# Patient Record
Sex: FEMALE | Race: WHITE | NOT HISPANIC OR LATINO | Employment: FULL TIME | URBAN - METROPOLITAN AREA
[De-identification: names, ages, dates, MRNs, and addresses within clinical notes are randomized per-mention and may not be internally consistent; named-entity substitution may affect disease eponyms.]

---

## 2019-05-28 ENCOUNTER — HOSPITAL ENCOUNTER (EMERGENCY)
Facility: OTHER | Age: 34
Discharge: HOME | End: 2019-05-28
Attending: EMERGENCY MEDICINE
Payer: COMMERCIAL

## 2019-05-28 VITALS
HEART RATE: 90 BPM | WEIGHT: 195 LBS | DIASTOLIC BLOOD PRESSURE: 84 MMHG | RESPIRATION RATE: 18 BRPM | HEIGHT: 64 IN | SYSTOLIC BLOOD PRESSURE: 119 MMHG | TEMPERATURE: 99 F | OXYGEN SATURATION: 96 % | BODY MASS INDEX: 33.29 KG/M2

## 2019-05-28 DIAGNOSIS — R42 LIGHT HEADEDNESS: ICD-10-CM

## 2019-05-28 DIAGNOSIS — R20.2 PARESTHESIA OF RIGHT ARM: Primary | ICD-10-CM

## 2019-05-28 LAB
ALBUMIN SERPL BCP-MCNC: 3.6 G/DL (ref 3.5–5.2)
ALP SERPL-CCNC: 83 U/L (ref 55–135)
ALT SERPL W/O P-5'-P-CCNC: 26 U/L (ref 10–44)
ANION GAP SERPL CALC-SCNC: 11 MMOL/L (ref 8–16)
AST SERPL-CCNC: 23 U/L (ref 10–40)
B-HCG UR QL: NEGATIVE
BASOPHILS # BLD AUTO: 0.02 K/UL (ref 0–0.2)
BASOPHILS NFR BLD: 0.2 % (ref 0–1.9)
BILIRUB SERPL-MCNC: 0.2 MG/DL (ref 0.1–1)
BUN SERPL-MCNC: 8 MG/DL (ref 6–20)
CALCIUM SERPL-MCNC: 9.4 MG/DL (ref 8.7–10.5)
CHLORIDE SERPL-SCNC: 108 MMOL/L (ref 95–110)
CO2 SERPL-SCNC: 22 MMOL/L (ref 23–29)
CREAT SERPL-MCNC: 0.7 MG/DL (ref 0.5–1.4)
CTP QC/QA: YES
D DIMER PPP IA.FEU-MCNC: 0.5 MG/L FEU
DIFFERENTIAL METHOD: ABNORMAL
EOSINOPHIL # BLD AUTO: 0.2 K/UL (ref 0–0.5)
EOSINOPHIL NFR BLD: 2.4 % (ref 0–8)
ERYTHROCYTE [DISTWIDTH] IN BLOOD BY AUTOMATED COUNT: 12.9 % (ref 11.5–14.5)
EST. GFR  (AFRICAN AMERICAN): >60 ML/MIN/1.73 M^2
EST. GFR  (NON AFRICAN AMERICAN): >60 ML/MIN/1.73 M^2
GLUCOSE SERPL-MCNC: 97 MG/DL (ref 70–110)
HCT VFR BLD AUTO: 39.7 % (ref 37–48.5)
HGB BLD-MCNC: 13.9 G/DL (ref 12–16)
LYMPHOCYTES # BLD AUTO: 2.4 K/UL (ref 1–4.8)
LYMPHOCYTES NFR BLD: 29.2 % (ref 18–48)
MCH RBC QN AUTO: 31.2 PG (ref 27–31)
MCHC RBC AUTO-ENTMCNC: 35 G/DL (ref 32–36)
MCV RBC AUTO: 89 FL (ref 82–98)
MONOCYTES # BLD AUTO: 0.5 K/UL (ref 0.3–1)
MONOCYTES NFR BLD: 5.9 % (ref 4–15)
NEUTROPHILS # BLD AUTO: 5.2 K/UL (ref 1.8–7.7)
NEUTROPHILS NFR BLD: 61.9 % (ref 38–73)
PLATELET # BLD AUTO: 278 K/UL (ref 150–350)
PMV BLD AUTO: 10.6 FL (ref 9.2–12.9)
POTASSIUM SERPL-SCNC: 3.8 MMOL/L (ref 3.5–5.1)
PROT SERPL-MCNC: 7.2 G/DL (ref 6–8.4)
RBC # BLD AUTO: 4.45 M/UL (ref 4–5.4)
SODIUM SERPL-SCNC: 141 MMOL/L (ref 136–145)
WBC # BLD AUTO: 8.37 K/UL (ref 3.9–12.7)

## 2019-05-28 PROCEDURE — 99284 EMERGENCY DEPT VISIT MOD MDM: CPT

## 2019-05-28 PROCEDURE — 85025 COMPLETE CBC W/AUTO DIFF WBC: CPT

## 2019-05-28 PROCEDURE — 93005 ELECTROCARDIOGRAM TRACING: CPT

## 2019-05-28 PROCEDURE — 81025 URINE PREGNANCY TEST: CPT | Performed by: EMERGENCY MEDICINE

## 2019-05-28 PROCEDURE — 93010 EKG 12-LEAD: ICD-10-PCS | Mod: ,,, | Performed by: INTERNAL MEDICINE

## 2019-05-28 PROCEDURE — 93010 ELECTROCARDIOGRAM REPORT: CPT | Mod: ,,, | Performed by: INTERNAL MEDICINE

## 2019-05-28 PROCEDURE — 80053 COMPREHEN METABOLIC PANEL: CPT

## 2019-05-28 PROCEDURE — 85379 FIBRIN DEGRADATION QUANT: CPT

## 2019-05-29 NOTE — ED TRIAGE NOTES
Pt arrives to ED with c/o right arm pain, numbness and tingling. Pt reports blood vessels hyperpigmented on Thursday. Pt reports soreness x 1 month. Pt denies SOB, chest pain, N/V/D. PT +2 radial pulse equally bilaterally. Pt denies numbness and tingling at this time. Pt sitting in exam bed, respirations even, unlabored, eyes open spontaneously, NAD noted, AAOx4, answering questions appropriately.

## 2019-05-29 NOTE — ED PROVIDER NOTES
"Encounter Date: 5/28/2019    SCRIBE #1 NOTE: I, Thaddeus Wallace, am scribing for, and in the presence of, Dr. Kruse .       History     Chief Complaint   Patient presents with    Arm Pain     Pt reports pain to right arm x 2 months, reports lightheadedness and "headache" x 3 days     Time seen by provider: 8:07 PM    This is a 33 y.o. female who presents with complaint of constant, right, arm pain described as soreness for two months. Patient states the soreness extends from the fingertips to her right anterior chest wall. She is right hand dominant and thought she was sleeping on the arm initially. She denies recent falls or trauma to the arm. She reports intermittent tingling in the right thumb and index finger. Patient states the veins in the arm was very prominent five nights ago while at a bar. She does admit to consuming a few alcoholic drinks that night. She denies neck pain or back pain. Patient states she presented to Urgent Care on 5/24, was prescribed a muscle relaxer, and was "referred to a vascular physician". She reports the next available appointment is on 6/12, so she decided to present to the ED.      She has been feeling light-headed for three days, but denies LOC. She reports waking up the last three mornings sweating. She reports rare mild chest pain (deascribed as "feeling the soreness from my arm") with deep inspirations and clear rhinorrhea. She denies fevers, chills, headaches, congestion, sore throat, cough, SOB, abdominal pain, nausea, vomiting, diarrhea, dysuria, urinary frequency, or urinary urgency.     She denies significant past medical history or past surgical history. She admits to daily use of citalopram. She admits to daily use of tobacco and intermittent use of alcohol (3-5 drinks each time). She denies use of illicit drugs. Per friend, patient's cheeks are more "flush" than usual. She denies being out in the sun recently.       The history is provided by the patient (friend at " bedside).     Review of patient's allergies indicates:  No Known Allergies  Past Medical History:   Diagnosis Date    H/O LEEP      Past Surgical History:   Procedure Laterality Date    TONSILLECTOMY       History reviewed. No pertinent family history.  Social History     Tobacco Use    Smoking status: Never Smoker   Substance Use Topics    Alcohol use: Never     Frequency: Never    Drug use: Never     Review of Systems   Constitutional: Positive for diaphoresis. Negative for chills and fever.   HENT: Positive for rhinorrhea. Negative for congestion and sore throat.    Respiratory: Negative for cough and shortness of breath.    Cardiovascular: Positive for chest pain (with deep inspirations).   Gastrointestinal: Negative for abdominal pain, diarrhea, nausea and vomiting.   Genitourinary: Negative for dysuria, frequency and urgency.   Musculoskeletal: Negative for back pain and neck pain.        Positive for right arm soreness.    Skin: Negative for rash.   Neurological: Positive for light-headedness. Negative for syncope and headaches.        Positive for tingling in right fingers.    Psychiatric/Behavioral: Negative for confusion.       Physical Exam     Initial Vitals [05/28/19 1956]   BP Pulse Resp Temp SpO2   (!) 135/90 100 18 99.1 °F (37.3 °C) 98 %      MAP       --         Physical Exam    Nursing note and vitals reviewed.  Constitutional: She appears well-developed and well-nourished. No distress.   HENT:   Head: Normocephalic and atraumatic.   Nose: Nose normal.   Mouth/Throat: Oropharynx is clear and moist.   Eyes: Conjunctivae and EOM are normal. Pupils are equal, round, and reactive to light.   Neck: Normal range of motion. Neck supple.   Cardiovascular: Normal rate, regular rhythm, normal heart sounds and intact distal pulses.   No murmur heard.  Pulses:       Radial pulses are 2+ on the right side, and 2+ on the left side.   Pulmonary/Chest: Breath sounds normal. No respiratory distress. She has no  wheezes. She has no rhonchi. She has no rales.   Abdominal: Soft. There is no tenderness. There is no rebound.   Musculoskeletal: Normal range of motion. She exhibits no tenderness.   RUE: No pain with ROM of all joints of upper extremities.    Neurological: She is alert and oriented to person, place, and time. She has normal strength. No cranial nerve deficit or sensory deficit. GCS score is 15. GCS eye subscore is 4. GCS verbal subscore is 5. GCS motor subscore is 6.   AI/PI/R/U/M intact to bilateral upper extremities for strength and sensation, equal.   Skin: Skin is warm and dry.   Psychiatric: She has a normal mood and affect. Her behavior is normal. Judgment and thought content normal.         ED Course   Procedures  Labs Reviewed   CBC W/ AUTO DIFFERENTIAL - Abnormal; Notable for the following components:       Result Value    Mean Corpuscular Hemoglobin 31.2 (*)     All other components within normal limits   COMPREHENSIVE METABOLIC PANEL   D DIMER, QUANTITATIVE   POCT URINE PREGNANCY     EKG Readings: (Independently Interpreted)   Initial Reading: No STEMI. Rhythm: Normal Sinus Rhythm. Heart Rate: 86. Ectopy: No Ectopy.       Imaging Results          US Upper Extremity Veins Right (In process)                  Medical Decision Making:   Independently Interpreted Test(s):   I have ordered and independently interpreted EKG Reading(s) - see prior notes  Clinical Tests:   Lab Tests: Ordered and Reviewed  Radiological Study: Ordered and Reviewed  Medical Tests: Ordered and Reviewed  ED Management:  Emergent evaluation of a 33-year-old female who presents with complaint of right arm pain for months, lightheadedness for days.  She was not orthostatic and has normal vital signs.  Pregnancy test is negative. EKG shows no acute ischemic change or dysrhythmia.  Labs are benign with no leukocytosis, no anemia, no major electrolyte disturbance.  There was slight elevation in D-dimer and ultrasound of the right upper  extremity was performed and negative. I do not suspect a pulmonary embolism, no tachycardia or shortness of breath or other risk factor.  Ultimately, she is discharged home in good condition.  She is encouraged close follow-up with her PCP or to return for new or worsening.  At time of discharge patient became hostile and angry, upset that there was no clear diagnosis to explain her symptoms. I encouraged her to follow up for further testing and to return for worsening.            Scribe Attestation:   Scribe #1: I performed the above scribed service and the documentation accurately describes the services I performed. I attest to the accuracy of the note.    Attending Attestation:           Physician Attestation for Scribe:  Physician Attestation Statement for Scribe #1: I, Dr. Kruse, reviewed documentation, as scribed by Thaddeus Wallace  in my presence, and it is both accurate and complete.                    Clinical Impression:     1. Light headedness                                 Kayce Kruse MD  05/29/19 9131

## 2020-06-10 ENCOUNTER — HOSPITAL ENCOUNTER (INPATIENT)
Facility: OTHER | Age: 35
LOS: 3 days | Discharge: HOME | DRG: 060 | End: 2020-06-14
Attending: EMERGENCY MEDICINE | Admitting: INTERNAL MEDICINE
Payer: COMMERCIAL

## 2020-06-10 DIAGNOSIS — G83.14 PARALYSIS OF LEFT LOWER EXTREMITY: ICD-10-CM

## 2020-06-10 DIAGNOSIS — G93.9 BRAIN LESION: ICD-10-CM

## 2020-06-10 DIAGNOSIS — R20.2 PARESTHESIA OF LEFT UPPER AND LOWER EXTREMITY: ICD-10-CM

## 2020-06-10 DIAGNOSIS — R20.2 PARESTHESIA OF LEFT UPPER EXTREMITY: ICD-10-CM

## 2020-06-10 DIAGNOSIS — G35 MS (MULTIPLE SCLEROSIS): Primary | ICD-10-CM

## 2020-06-10 PROBLEM — R73.9 HYPERGLYCEMIA: Status: ACTIVE | Noted: 2020-06-10

## 2020-06-10 PROBLEM — E86.0 DEHYDRATION: Status: ACTIVE | Noted: 2020-06-10

## 2020-06-10 LAB
ALBUMIN SERPL BCP-MCNC: 3.8 G/DL (ref 3.5–5.2)
ALP SERPL-CCNC: 95 U/L (ref 55–135)
ALT SERPL W/O P-5'-P-CCNC: 30 U/L (ref 10–44)
ANION GAP SERPL CALC-SCNC: 13 MMOL/L (ref 8–16)
AST SERPL-CCNC: 23 U/L (ref 10–40)
B-HCG UR QL: NEGATIVE
BASOPHILS # BLD AUTO: 0.03 K/UL (ref 0–0.2)
BASOPHILS NFR BLD: 0.2 % (ref 0–1.9)
BILIRUB SERPL-MCNC: 0.4 MG/DL (ref 0.1–1)
BILIRUB UR QL STRIP: ABNORMAL
BUN SERPL-MCNC: 8 MG/DL (ref 6–20)
CALCIUM SERPL-MCNC: 9.7 MG/DL (ref 8.7–10.5)
CHLORIDE SERPL-SCNC: 105 MMOL/L (ref 95–110)
CLARITY UR: CLEAR
CO2 SERPL-SCNC: 19 MMOL/L (ref 23–29)
COLOR UR: YELLOW
CREAT SERPL-MCNC: 0.8 MG/DL (ref 0.5–1.4)
CTP QC/QA: YES
DIFFERENTIAL METHOD: ABNORMAL
EOSINOPHIL # BLD AUTO: 0.1 K/UL (ref 0–0.5)
EOSINOPHIL NFR BLD: 1 % (ref 0–8)
ERYTHROCYTE [DISTWIDTH] IN BLOOD BY AUTOMATED COUNT: 13 % (ref 11.5–14.5)
EST. GFR  (AFRICAN AMERICAN): >60 ML/MIN/1.73 M^2
EST. GFR  (NON AFRICAN AMERICAN): >60 ML/MIN/1.73 M^2
GLUCOSE SERPL-MCNC: 210 MG/DL (ref 70–110)
GLUCOSE SERPL-MCNC: 215 MG/DL (ref 70–110)
GLUCOSE UR QL STRIP: NEGATIVE
HCT VFR BLD AUTO: 45.6 % (ref 37–48.5)
HGB BLD-MCNC: 15 G/DL (ref 12–16)
HGB UR QL STRIP: NEGATIVE
IMM GRANULOCYTES # BLD AUTO: 0.04 K/UL (ref 0–0.04)
IMM GRANULOCYTES NFR BLD AUTO: 0.3 % (ref 0–0.5)
INR PPP: 0.9 (ref 0.8–1.2)
KETONES UR QL STRIP: ABNORMAL
LEUKOCYTE ESTERASE UR QL STRIP: NEGATIVE
LYMPHOCYTES # BLD AUTO: 3.5 K/UL (ref 1–4.8)
LYMPHOCYTES NFR BLD: 26.3 % (ref 18–48)
MCH RBC QN AUTO: 30.1 PG (ref 27–31)
MCHC RBC AUTO-ENTMCNC: 32.9 G/DL (ref 32–36)
MCV RBC AUTO: 91 FL (ref 82–98)
MONOCYTES # BLD AUTO: 0.3 K/UL (ref 0.3–1)
MONOCYTES NFR BLD: 2.1 % (ref 4–15)
NEUTROPHILS # BLD AUTO: 9.4 K/UL (ref 1.8–7.7)
NEUTROPHILS NFR BLD: 70.1 % (ref 38–73)
NITRITE UR QL STRIP: NEGATIVE
NRBC BLD-RTO: 0 /100 WBC
PH UR STRIP: 6 [PH] (ref 5–8)
PLATELET # BLD AUTO: 325 K/UL (ref 150–350)
PMV BLD AUTO: 11.2 FL (ref 9.2–12.9)
POCT GLUCOSE: 215 MG/DL (ref 70–110)
POTASSIUM SERPL-SCNC: 4 MMOL/L (ref 3.5–5.1)
PROT SERPL-MCNC: 7.4 G/DL (ref 6–8.4)
PROT UR QL STRIP: NEGATIVE
PROTHROMBIN TIME: 9.9 SEC (ref 9–12.5)
RBC # BLD AUTO: 4.99 M/UL (ref 4–5.4)
SARS-COV-2 RDRP RESP QL NAA+PROBE: NEGATIVE
SODIUM SERPL-SCNC: 137 MMOL/L (ref 136–145)
SP GR UR STRIP: >=1.03 (ref 1–1.03)
TSH SERPL DL<=0.005 MIU/L-ACNC: 0.56 UIU/ML (ref 0.4–4)
URN SPEC COLLECT METH UR: ABNORMAL
UROBILINOGEN UR STRIP-ACNC: NEGATIVE EU/DL
WBC # BLD AUTO: 13.37 K/UL (ref 3.9–12.7)

## 2020-06-10 PROCEDURE — 85610 PROTHROMBIN TIME: CPT

## 2020-06-10 PROCEDURE — 81003 URINALYSIS AUTO W/O SCOPE: CPT

## 2020-06-10 PROCEDURE — 99220 PR INITIAL OBSERVATION CARE,LEVL III: CPT | Mod: ,,, | Performed by: PHYSICIAN ASSISTANT

## 2020-06-10 PROCEDURE — 99285 EMERGENCY DEPT VISIT HI MDM: CPT | Mod: 25

## 2020-06-10 PROCEDURE — G0378 HOSPITAL OBSERVATION PER HR: HCPCS

## 2020-06-10 PROCEDURE — 99220 PR INITIAL OBSERVATION CARE,LEVL III: ICD-10-PCS | Mod: ,,, | Performed by: PHYSICIAN ASSISTANT

## 2020-06-10 PROCEDURE — 80053 COMPREHEN METABOLIC PANEL: CPT

## 2020-06-10 PROCEDURE — 85025 COMPLETE CBC W/AUTO DIFF WBC: CPT

## 2020-06-10 PROCEDURE — 84443 ASSAY THYROID STIM HORMONE: CPT

## 2020-06-10 PROCEDURE — 80061 LIPID PANEL: CPT

## 2020-06-10 PROCEDURE — 81025 URINE PREGNANCY TEST: CPT | Performed by: PHYSICIAN ASSISTANT

## 2020-06-10 PROCEDURE — 94761 N-INVAS EAR/PLS OXIMETRY MLT: CPT

## 2020-06-10 PROCEDURE — U0002 COVID-19 LAB TEST NON-CDC: HCPCS

## 2020-06-10 PROCEDURE — 82962 GLUCOSE BLOOD TEST: CPT

## 2020-06-10 RX ORDER — SODIUM CHLORIDE 9 MG/ML
INJECTION, SOLUTION INTRAVENOUS CONTINUOUS
Status: DISCONTINUED | OUTPATIENT
Start: 2020-06-10 | End: 2020-06-11

## 2020-06-10 RX ORDER — LABETALOL HYDROCHLORIDE 5 MG/ML
10 INJECTION, SOLUTION INTRAVENOUS
Status: DISCONTINUED | OUTPATIENT
Start: 2020-06-10 | End: 2020-06-11

## 2020-06-10 RX ORDER — ENOXAPARIN SODIUM 100 MG/ML
40 INJECTION SUBCUTANEOUS EVERY 24 HOURS
Status: DISCONTINUED | OUTPATIENT
Start: 2020-06-10 | End: 2020-06-11

## 2020-06-10 RX ORDER — SODIUM CHLORIDE 0.9 % (FLUSH) 0.9 %
10 SYRINGE (ML) INJECTION
Status: DISCONTINUED | OUTPATIENT
Start: 2020-06-10 | End: 2020-06-14 | Stop reason: HOSPADM

## 2020-06-10 RX ORDER — ONDANSETRON 8 MG/1
8 TABLET, ORALLY DISINTEGRATING ORAL EVERY 8 HOURS PRN
Status: DISCONTINUED | OUTPATIENT
Start: 2020-06-10 | End: 2020-06-14 | Stop reason: HOSPADM

## 2020-06-10 RX ORDER — CITALOPRAM 10 MG/1
10 TABLET ORAL DAILY
Status: DISCONTINUED | OUTPATIENT
Start: 2020-06-11 | End: 2020-06-14 | Stop reason: HOSPADM

## 2020-06-10 NOTE — ED TRIAGE NOTES
Pt presents to ed c/o L sided knumbness with tingling that started Monday. She denies taking anything for the s/s. Pt admits to speaking to a tele doctor about the situation and she states he told her it might be neurological. Pt denies having an recent falls, denies any diplopia, or photophobia. Pt has equal hand . Pt has symmetric bilateral sensation to both sides of her face, bilateral Upper and Lower extremities. She denies any current weakness, with noted symmetric smile with no significant facial dropping. Pt has equal pupils, about 4  mm, brisk. She denies any n/v/d, fever, cough, or SOB. Pt AAOx4, resp pattern even and non labored.

## 2020-06-10 NOTE — ED PROVIDER NOTES
Encounter Date: 6/10/2020       History     Chief Complaint   Patient presents with    Numbness     began in the L foot yesterday that has now travelled up the entire L side of the body which she noticed at 0645 today. Pins and needles feeling     Afebrile 34-year-old female with PMH of anxiety presents the ED for evaluation of left-sided paresthesias.  Patient states that she awoke on Monday and noted that her left foot had a pins and needle sensation.  She states that extends to just distal of the left knee.  She denies any recent falls or trauma.  She denies any ill positioning during sleep.  She states this has been constant since onset.  She states that she awoke today and that she had a similar pins and needle sensation to the left upper extremity.  She states that is located to the generalized hand and extends to just proximal to the left elbow.  Once again she denies any recent falls or trauma.  She states this sensation has been constant.  She describes a sensation of heaviness to the left extremities however denies any overt weakness.  She denies any vision changes, headache, fever, chills, neck pain, back pain, chest pain, shortness of breath, dropping of objects, change in gait, inability to bear weight or other complaints at this time.  She has not tried any medications for symptoms.  She did complete a telemedicine visit through her insurance and the provider encouraged her to seek further evaluation as there was a concern of neurological involvement.  She denies any prior CVA, TIA or family history of strokes.    The history is provided by the patient.     Review of patient's allergies indicates:   Allergen Reactions    Gluten protein     Soy      Past Medical History:   Diagnosis Date    Anxiety     H/O LEEP      Past Surgical History:   Procedure Laterality Date    TONSILLECTOMY       History reviewed. No pertinent family history.  Social History     Tobacco Use    Smoking status: Current  Every Day Smoker     Packs/day: 0.50   Substance Use Topics    Alcohol use: Yes     Frequency: Never     Comment: social    Drug use: Never     Review of Systems   Constitutional: Negative for chills and fever.   HENT: Negative for sore throat.    Eyes: Negative for visual disturbance.   Respiratory: Negative for shortness of breath.    Cardiovascular: Negative for chest pain.   Gastrointestinal: Negative for nausea and vomiting.   Genitourinary: Negative for flank pain.   Musculoskeletal: Negative for arthralgias, back pain and myalgias.   Skin: Negative for rash.   Allergic/Immunologic: Negative for immunocompromised state.   Neurological: Positive for numbness. Negative for dizziness, syncope, speech difficulty, weakness, light-headedness and headaches.        Paresthesias to the left upper and lower extremity   Hematological: Does not bruise/bleed easily.   Psychiatric/Behavioral: Negative for confusion. The patient is nervous/anxious.        Physical Exam     Initial Vitals [06/10/20 1701]   BP Pulse Resp Temp SpO2   130/80 (!) 120 20 98.2 °F (36.8 °C) 96 %      MAP       --         Vitals:    06/10/20 1758   BP:    Pulse: 93   Resp:    Temp:        Physical Exam    Nursing note and vitals reviewed.  Constitutional: Vital signs are normal. She appears well-developed and well-nourished. She is cooperative.  Non-toxic appearance. She does not appear ill. No distress.   HENT:   Head: Normocephalic and atraumatic.   Eyes: Conjunctivae, EOM and lids are normal. Pupils are equal, round, and reactive to light.   Neck: Neck supple. No neck rigidity.   Cardiovascular: Normal rate and regular rhythm.   Pulmonary/Chest: Breath sounds normal. No respiratory distress. She has no wheezes. She has no rhonchi.   Abdominal: Soft. Normal appearance. There is no tenderness. There is no rigidity and no guarding.   Musculoskeletal: Normal range of motion.   Neurological: She is alert and oriented to person, place, and time. She  has normal strength. No cranial nerve deficit or sensory deficit. Coordination and gait normal. GCS score is 15. GCS eye subscore is 4. GCS verbal subscore is 5. GCS motor subscore is 6.   Reflex Scores:       Tricep reflexes are 1+ on the right side and 1+ on the left side.       Patellar reflexes are 2+ on the right side and 1+ on the left side.  Normal finger-nose, patient was able to ambulate smooth steady gait and turn completely with no difficulty or ataxia.   Skin: Skin is warm, dry and intact. No rash noted.   Psychiatric: Her speech is normal and behavior is normal. Thought content normal. Her mood appears anxious.         ED Course   Procedures  Labs Reviewed   CBC W/ AUTO DIFFERENTIAL - Abnormal; Notable for the following components:       Result Value    WBC 13.37 (*)     Gran # (ANC) 9.4 (*)     Mono% 2.1 (*)     All other components within normal limits   URINALYSIS, REFLEX TO URINE CULTURE - Abnormal; Notable for the following components:    Specific Gravity, UA >=1.030 (*)     Ketones, UA Trace (*)     Bilirubin (UA) 1+ (*)     All other components within normal limits    Narrative:     Preferred Collection Type->Urine, Clean Catch   COMPREHENSIVE METABOLIC PANEL - Abnormal; Notable for the following components:    CO2 19 (*)     Glucose 210 (*)     All other components within normal limits   POCT GLUCOSE, HAND-HELD DEVICE - Abnormal; Notable for the following components:    POC Glucose 215 (*)     All other components within normal limits   POCT GLUCOSE - Abnormal; Notable for the following components:    POCT Glucose 215 (*)     All other components within normal limits   TSH   PROTIME-INR   COMPREHENSIVE METABOLIC PANEL   SARS-COV-2 RNA AMPLIFICATION, QUAL   LIPID PANEL   POCT URINE PREGNANCY          Imaging Results          CT Head Without Contrast (Final result)  Result time 06/10/20 18:56:00    Final result by Lianet Avila MD (06/10/20 18:56:00)                 Impression:      No acute  intracranial abnormality detected. If persistent neurological symptoms, recommend MRI of the brain with diffusion-weighted sequencing.      Electronically signed by: Lianet Avila  Date:    06/10/2020  Time:    18:56             Narrative:    EXAMINATION:  CT OF THE HEAD WITHOUT    CLINICAL HISTORY:  Focal neuro deficit, new, fixed or worsening, >6 hours;    TECHNIQUE:  5 mm unenhanced axial images were obtained from the skull base to the vertex.    COMPARISON:  None.    FINDINGS:  The ventricles, basal cisterns, and cortical sulci are within normal limits for patient's stated age. There is no acute intracranial hemorrhage, territorial infarct or mass effect, or midline shift. The visualized paranasal sinuses and mastoid air cells are clear.                                 Medical Decision Making:   Initial Assessment:   34-year-old female presents to the ED for evaluation of left upper and lower extremity paresthesias.  She states symptoms began Monday upon waking to the left lower extremity.  She states that she then began with left upper extremity paresthesias today.  She describes it as a pins and needle sensation with associated heaviness.  She denies any definite numbness.  She appears well and ambulates with smooth steady gait and no difficulty.  No focal neuro deficit on exam.  Sensation grossly intact bilaterally.  Skin free of rash, pallor, diaphoresis.  Distal pulses intact.  Capillary refill intact  Differential Diagnosis:   Differential Diagnosis includes, but is not limited to:  Fracture, dislocation, compartment syndrome, nerve injury/palsy, vascular injury, rhabdomyolysis, hemarthrosis, septic joint, bursitis, muscle strain, ligament tear/sprain, laceration with foreign body, abrasion, soft tissue contusion, osteoarthritis.    Clinical Tests:   Lab Tests: Ordered and Reviewed  Radiological Study: Ordered and Reviewed  ED Management:  Given patient's progressing and continued paresthesias of the  left upper and lower extremity labs and CT obtained although discussed no focal neuro deficit on exam.  Labs notable for trace ketones in urine.  Mild leukocytosis at 13.  Blood glucose is elevated however no elevated anion gap.  Remaining labs grossly unremarkable.  CT with no acute findings at this time.  Did discuss patient's presentation for possible concern with MS and will consult Neurology.  Neurology ( Dr. Mccartney) was consulted and recommends patient to be placed in observation with MRI of brain and spine with and without contrast for further evaluation.  Patient will be admitted to hospitalist for further evaluation.  Discussed all results with patient and plan for MRI in the morning for further evaluation with this imaging modality by neurology.                   ED Course as of Scott 10 2300   Wed Scott 10, 2020   1920 Pt is a 34 y.o. F history of R thoracic outlet syndrome who presents with paresthesias at left leg 2 days ago, and now also at her left arm. She denies any other focal deficits. No trauma. No history consistent with optic neuritis, or urinary retention.   Initial Ddx included stroke, TIA, MS, electrolyte abnormality.   Physician Attestation Statement: I have reviewed this case with my non-physician provider.  Physician Attestation Statement: I have provided a face to face evaluation of this patient at the request of my  non-physician provider.  Physician Attestation Statement: The patient's condition warranted physician involvement. The treatment regimen was reviewed by me.   Management decisions for this encounter made amidst early acute phase of COVID19 public health emergency; emergency medicine workup standards and admission vs. discharge standards have necessarily shifted.     [RC]      ED Course User Index  [RC] Marty Cabral MD                Clinical Impression:       ICD-10-CM ICD-9-CM   1. Paresthesia of left upper extremity R20.2 782.0   2. Paralysis of left lower extremity  G83.14 344.32                                ANNAMARIE Kerns  06/10/20 6652

## 2020-06-11 PROBLEM — G93.9 BRAIN LESION: Status: ACTIVE | Noted: 2020-06-10

## 2020-06-11 PROBLEM — R20.2 PARESTHESIA OF LEFT UPPER EXTREMITY: Status: ACTIVE | Noted: 2020-06-11

## 2020-06-11 PROBLEM — F10.90 CHRONIC ALCOHOL USE: Status: ACTIVE | Noted: 2020-06-11

## 2020-06-11 LAB
25(OH)D3+25(OH)D2 SERPL-MCNC: 18 NG/ML (ref 30–96)
ALBUMIN SERPL BCP-MCNC: 3.3 G/DL (ref 3.5–5.2)
ALP SERPL-CCNC: 79 U/L (ref 55–135)
ALT SERPL W/O P-5'-P-CCNC: 25 U/L (ref 10–44)
ANION GAP SERPL CALC-SCNC: 10 MMOL/L (ref 8–16)
APTT BLDCRRT: 32.1 SEC (ref 21–32)
AST SERPL-CCNC: 18 U/L (ref 10–40)
BACTERIA #/AREA URNS HPF: NORMAL /HPF
BASOPHILS # BLD AUTO: 0.03 K/UL (ref 0–0.2)
BASOPHILS NFR BLD: 0.2 % (ref 0–1.9)
BILIRUB SERPL-MCNC: 0.4 MG/DL (ref 0.1–1)
BILIRUB UR QL STRIP: NEGATIVE
BLOOD COLLECTION FOR MS PROFILE: NORMAL
BUN SERPL-MCNC: 10 MG/DL (ref 6–20)
CALCIUM SERPL-MCNC: 8.7 MG/DL (ref 8.7–10.5)
CHLORIDE SERPL-SCNC: 108 MMOL/L (ref 95–110)
CHOLEST SERPL-MCNC: 232 MG/DL (ref 120–199)
CHOLEST/HDLC SERPL: 4.6 {RATIO} (ref 2–5)
CK MB SERPL-MCNC: 0.2 NG/ML (ref 0.1–6.5)
CK MB SERPL-RTO: 1.5 % (ref 0–5)
CK SERPL-CCNC: 13 U/L (ref 20–180)
CLARITY UR: ABNORMAL
CO2 SERPL-SCNC: 20 MMOL/L (ref 23–29)
COLOR UR: YELLOW
CREAT SERPL-MCNC: 0.7 MG/DL (ref 0.5–1.4)
DIFFERENTIAL METHOD: ABNORMAL
EOSINOPHIL # BLD AUTO: 0.2 K/UL (ref 0–0.5)
EOSINOPHIL NFR BLD: 1.3 % (ref 0–8)
ERYTHROCYTE [DISTWIDTH] IN BLOOD BY AUTOMATED COUNT: 12.9 % (ref 11.5–14.5)
EST. GFR  (AFRICAN AMERICAN): >60 ML/MIN/1.73 M^2
EST. GFR  (NON AFRICAN AMERICAN): >60 ML/MIN/1.73 M^2
ESTIMATED AVG GLUCOSE: 100 MG/DL (ref 68–131)
ESTIMATED AVG GLUCOSE: 97 MG/DL (ref 68–131)
FOLATE SERPL-MCNC: 5.8 NG/ML (ref 4–24)
GLUCOSE SERPL-MCNC: 84 MG/DL (ref 70–110)
GLUCOSE UR QL STRIP: NEGATIVE
HBA1C MFR BLD HPLC: 5 % (ref 4–5.6)
HBA1C MFR BLD HPLC: 5.1 % (ref 4–5.6)
HCT VFR BLD AUTO: 41 % (ref 37–48.5)
HDLC SERPL-MCNC: 50 MG/DL (ref 40–75)
HDLC SERPL: 21.6 % (ref 20–50)
HGB BLD-MCNC: 13.3 G/DL (ref 12–16)
HGB UR QL STRIP: NEGATIVE
IMM GRANULOCYTES # BLD AUTO: 0.07 K/UL (ref 0–0.04)
IMM GRANULOCYTES NFR BLD AUTO: 0.5 % (ref 0–0.5)
INR PPP: 0.9 (ref 0.8–1.2)
KETONES UR QL STRIP: NEGATIVE
LDLC SERPL CALC-MCNC: 149.6 MG/DL (ref 63–159)
LEUKOCYTE ESTERASE UR QL STRIP: ABNORMAL
LYMPHOCYTES # BLD AUTO: 4.8 K/UL (ref 1–4.8)
LYMPHOCYTES NFR BLD: 36.7 % (ref 18–48)
MAGNESIUM SERPL-MCNC: 1.9 MG/DL (ref 1.6–2.6)
MCH RBC QN AUTO: 30.2 PG (ref 27–31)
MCHC RBC AUTO-ENTMCNC: 32.4 G/DL (ref 32–36)
MCV RBC AUTO: 93 FL (ref 82–98)
MICROSCOPIC COMMENT: NORMAL
MONOCYTES # BLD AUTO: 0.5 K/UL (ref 0.3–1)
MONOCYTES NFR BLD: 4.1 % (ref 4–15)
NEUTROPHILS # BLD AUTO: 7.5 K/UL (ref 1.8–7.7)
NEUTROPHILS NFR BLD: 57.2 % (ref 38–73)
NITRITE UR QL STRIP: NEGATIVE
NONHDLC SERPL-MCNC: 182 MG/DL
NRBC BLD-RTO: 0 /100 WBC
PH UR STRIP: 6 [PH] (ref 5–8)
PHOSPHATE SERPL-MCNC: 3.1 MG/DL (ref 2.7–4.5)
PLATELET # BLD AUTO: 276 K/UL (ref 150–350)
PMV BLD AUTO: 11.5 FL (ref 9.2–12.9)
POTASSIUM SERPL-SCNC: 3.9 MMOL/L (ref 3.5–5.1)
PROT SERPL-MCNC: 6.3 G/DL (ref 6–8.4)
PROT UR QL STRIP: NEGATIVE
PROTHROMBIN TIME: 9.8 SEC (ref 9–12.5)
RBC # BLD AUTO: 4.4 M/UL (ref 4–5.4)
SODIUM SERPL-SCNC: 138 MMOL/L (ref 136–145)
SP GR UR STRIP: 1.02 (ref 1–1.03)
SQUAMOUS #/AREA URNS HPF: 3 /HPF
TRIGL SERPL-MCNC: 162 MG/DL (ref 30–150)
TROPONIN I SERPL DL<=0.01 NG/ML-MCNC: 0.01 NG/ML (ref 0–0.03)
URN SPEC COLLECT METH UR: ABNORMAL
UROBILINOGEN UR STRIP-ACNC: NEGATIVE EU/DL
VIT B12 SERPL-MCNC: 379 PG/ML (ref 210–950)
WBC # BLD AUTO: 13.11 K/UL (ref 3.9–12.7)
WBC #/AREA URNS HPF: 2 /HPF (ref 0–5)

## 2020-06-11 PROCEDURE — 83036 HEMOGLOBIN GLYCOSYLATED A1C: CPT | Mod: 91

## 2020-06-11 PROCEDURE — 83036 HEMOGLOBIN GLYCOSYLATED A1C: CPT

## 2020-06-11 PROCEDURE — 84207 ASSAY OF VITAMIN B-6: CPT

## 2020-06-11 PROCEDURE — 25000003 PHARM REV CODE 250: Performed by: PHYSICIAN ASSISTANT

## 2020-06-11 PROCEDURE — S4991 NICOTINE PATCH NONLEGEND: HCPCS | Performed by: PHYSICIAN ASSISTANT

## 2020-06-11 PROCEDURE — 99223 PR INITIAL HOSPITAL CARE,LEVL III: ICD-10-PCS | Mod: ,,, | Performed by: PSYCHIATRY & NEUROLOGY

## 2020-06-11 PROCEDURE — 85610 PROTHROMBIN TIME: CPT

## 2020-06-11 PROCEDURE — 82306 VITAMIN D 25 HYDROXY: CPT

## 2020-06-11 PROCEDURE — 85730 THROMBOPLASTIN TIME PARTIAL: CPT

## 2020-06-11 PROCEDURE — 82553 CREATINE MB FRACTION: CPT

## 2020-06-11 PROCEDURE — 82042 OTHER SOURCE ALBUMIN QUAN EA: CPT

## 2020-06-11 PROCEDURE — 25500020 PHARM REV CODE 255: Performed by: INTERNAL MEDICINE

## 2020-06-11 PROCEDURE — 11000001 HC ACUTE MED/SURG PRIVATE ROOM

## 2020-06-11 PROCEDURE — 83735 ASSAY OF MAGNESIUM: CPT

## 2020-06-11 PROCEDURE — 84484 ASSAY OF TROPONIN QUANT: CPT

## 2020-06-11 PROCEDURE — 84100 ASSAY OF PHOSPHORUS: CPT

## 2020-06-11 PROCEDURE — 97802 MEDICAL NUTRITION INDIV IN: CPT

## 2020-06-11 PROCEDURE — 96372 THER/PROPH/DIAG INJ SC/IM: CPT

## 2020-06-11 PROCEDURE — 86592 SYPHILIS TEST NON-TREP QUAL: CPT

## 2020-06-11 PROCEDURE — 82550 ASSAY OF CK (CPK): CPT

## 2020-06-11 PROCEDURE — 99233 SBSQ HOSP IP/OBS HIGH 50: CPT | Mod: ,,, | Performed by: PHYSICIAN ASSISTANT

## 2020-06-11 PROCEDURE — 82164 ANGIOTENSIN I ENZYME TEST: CPT

## 2020-06-11 PROCEDURE — 97162 PT EVAL MOD COMPLEX 30 MIN: CPT

## 2020-06-11 PROCEDURE — 99223 1ST HOSP IP/OBS HIGH 75: CPT | Mod: ,,, | Performed by: PSYCHIATRY & NEUROLOGY

## 2020-06-11 PROCEDURE — 82607 VITAMIN B-12: CPT

## 2020-06-11 PROCEDURE — 80053 COMPREHEN METABOLIC PANEL: CPT

## 2020-06-11 PROCEDURE — 86038 ANTINUCLEAR ANTIBODIES: CPT

## 2020-06-11 PROCEDURE — 36415 COLL VENOUS BLD VENIPUNCTURE: CPT

## 2020-06-11 PROCEDURE — A9585 GADOBUTROL INJECTION: HCPCS | Performed by: INTERNAL MEDICINE

## 2020-06-11 PROCEDURE — 82746 ASSAY OF FOLIC ACID SERUM: CPT

## 2020-06-11 PROCEDURE — 85025 COMPLETE CBC W/AUTO DIFF WBC: CPT

## 2020-06-11 PROCEDURE — 97165 OT EVAL LOW COMPLEX 30 MIN: CPT

## 2020-06-11 PROCEDURE — 99233 PR SUBSEQUENT HOSPITAL CARE,LEVL III: ICD-10-PCS | Mod: ,,, | Performed by: PHYSICIAN ASSISTANT

## 2020-06-11 PROCEDURE — 84425 ASSAY OF VITAMIN B-1: CPT

## 2020-06-11 PROCEDURE — 86703 HIV-1/HIV-2 1 RESULT ANTBDY: CPT

## 2020-06-11 PROCEDURE — 81000 URINALYSIS NONAUTO W/SCOPE: CPT

## 2020-06-11 PROCEDURE — 63600175 PHARM REV CODE 636 W HCPCS: Performed by: PHYSICIAN ASSISTANT

## 2020-06-11 RX ORDER — LORAZEPAM 0.5 MG/1
0.5 TABLET ORAL EVERY 8 HOURS PRN
Status: DISCONTINUED | OUTPATIENT
Start: 2020-06-11 | End: 2020-06-14 | Stop reason: HOSPADM

## 2020-06-11 RX ORDER — IBUPROFEN 200 MG
1 TABLET ORAL DAILY
Status: DISCONTINUED | OUTPATIENT
Start: 2020-06-11 | End: 2020-06-14 | Stop reason: HOSPADM

## 2020-06-11 RX ORDER — GADOBUTROL 604.72 MG/ML
8.5 INJECTION INTRAVENOUS
Status: COMPLETED | OUTPATIENT
Start: 2020-06-11 | End: 2020-06-11

## 2020-06-11 RX ADMIN — LORAZEPAM 0.5 MG: 0.5 TABLET ORAL at 09:06

## 2020-06-11 RX ADMIN — LORAZEPAM 0.5 MG: 0.5 TABLET ORAL at 01:06

## 2020-06-11 RX ADMIN — ENOXAPARIN SODIUM 40 MG: 40 INJECTION SUBCUTANEOUS at 12:06

## 2020-06-11 RX ADMIN — SODIUM CHLORIDE: 0.9 INJECTION, SOLUTION INTRAVENOUS at 12:06

## 2020-06-11 RX ADMIN — NICOTINE 1 PATCH: 21 PATCH, EXTENDED RELEASE TRANSDERMAL at 01:06

## 2020-06-11 RX ADMIN — CITALOPRAM HYDROBROMIDE 10 MG: 10 TABLET ORAL at 09:06

## 2020-06-11 RX ADMIN — GADOBUTROL 8.5 ML: 604.72 INJECTION INTRAVENOUS at 09:06

## 2020-06-11 NOTE — ASSESSMENT & PLAN NOTE
- she has persistant paresthesias x Monday of the LLE, and x 1 day of the LUE   - concern for MS vs other  - CT head negative for acute findings  - MRA/MRIs 2 solidly enhancing lesions in the supratentorial white matter; Two additional subcentimeter nonenhancing lesions elsewhere.  Although not entirely specific, findings concerning for demyelinating disease (such as multiple sclerosis) with areas of active demyelination.  Lymphoma or other process not entirely excluded  - Neuro consulted   - plan for LP CSF studies ordered; Lumbar puncture, send CSF for cell count/protein/glucose, cytology, bacterial culture, fungal culture, HSV PCR, EBV PCR, ACE, CSF IgG index, oligoclonal bands  - check vitamin B1, B12, TSH, vitamin D, B6, LIZA, Ace, HIV, folic acid, RPR, PETH level   - will start 1 g IV daily after the lumbar puncture is done  - d/w Neuro

## 2020-06-11 NOTE — ASSESSMENT & PLAN NOTE
- UA w/ elevated specific gravity & trace ketones  - suspect this is also causing an apparent increase in WBC count  - no ROBERT on labs   - NS ordered overnight x 12 hours for hydration   - monitor AM labs

## 2020-06-11 NOTE — CONSULTS
NEUROLOGY CONSULT NOTE  [facility]    Patient Name:  Cari Barillas  Date of Consult: 2020  Admit Date:    MR #:  [id number]  Acct #:  [id number]  :  1985    Physicians:  Primary Doctor Laureen (Family); CHERELLE Eid MD  Consulting Physician:  Wandy Avalos MD       Chief Complaint/Reason for Consult:  Left-sided numbness and tingling      Assessment and Plan:  Cari Barillas is a 34 y.o. healthy female presented to the ER with worsening numbness over the left upper and lower extremities.    The patient states that she was in her usual state of health about a week ago when she noted numbness along the anterior aspect of her left leg going into the top of her left foot.  2-3 days later she noted numbness and weakness involving the left upper extremity.  Yesterday, the numbness involving the left arm intensified due to which she came into the emergency room for further evaluation.     She denies weight loss fever/chills/night sweats.  On examination, she has normal strength throughout, hyper reflexia involving bilateral upper and lower extremities, 2 beat ankle clonus, mute toes, diminished sensation to joint movement at the left MTP.  No evidence of abnormal pupillary dilation on swinging flashlight test.    MRI brain reveals 2 enhancing lesions, 1 measuring 1.2 cm2 along the splenium of the corpus callosum on the left, 0.7 cm enhancing focus in the juxtacortical white matter of the right parietal lobe.  Two nonenhancing lesions are noted in the left posterior temporal lobe and right parietal juxtacortical white matter.    Patient denies any prior episodes of sensory changes other than the ones that have been going on over the last week.      Assessment:    1.  Multiple enhancing brain lesions- concern for clinically isolated syndrome/multiple sclerosis vs metastatic lesions  2.  Chronic alcohol intake      Recommendations::    1.  Obtain MRI T-spine with and without contrast  2.  Given  the possibility of lymphoma, I would recommend obtaining a CT chest abdomen and pelvis with contrast  3.  Lumbar puncture, send CSF for cell count/protein/glucose, cytology, bacterial culture, fungal culture, HSV PCR, EBV PCR, ACE, CSF IgG index, oligoclonal bands  4.  Start methylprednisolone 1 g IV daily after the lumbar puncture is done  5.  Obtain vitamin B1, B12, TSH, vitamin D, B6, LIZA, Ace, HIV, folic acid, RPR, PETH level   6.  I would additionally recommend establishing care with Dr. Aida Oro as an outpatient      History of Present Illness:  Cari Barillas is a 34 y.o.female on whom I have been asked to consult for evaluation and treatment of abnormal brain MRI findings.    The patient states she was in her usual state of health up until 05/01/2020.  She woke up and noted left foot numbness which she describes as the feeling of dullness.  The left foot felt weak but she was able to walk.  Two days later she developed left arm numbness involving the medial arm.  She additionally felt like the left arm was heavy.  Yesterday, the left upper extremity sensory symptoms intensified and so she came into the emergency room for further evaluation.      Duration:  Greater than a week  Location:  Left upper and lower extremities  Intensity:  Moderate to severe  Aggravating factors:  None  Relieving factors:  None  Frequency:  Daily  Timing:  Upon awakening on 05/01/2020 and upon awakening on 05/03/2020  Associated symptoms:  Feeling of heaviness involving the left upper and lower extremities        Laboratory and Additional Data Reviewed:    MRI Brain Demyelinating W W/O Contrast   Final Result   Abnormal      Examination mildly degraded by patient motion artifact.      2 solidly enhancing lesions in the supratentorial white matter as above.  Two additional subcentimeter nonenhancing lesions elsewhere.  Although not entirely specific, findings concerning for demyelinating disease (such as multiple sclerosis)  with areas of active demyelination.  Lymphoma or other process not entirely excluded.  Clinical correlation required with follow-up suggested      Cervical spine appears within normal limits.  No cervical cord lesions identified.      This report was flagged in Epic as abnormal.         Electronically signed by: Levon Sierra MD   Date:    06/11/2020   Time:    10:13      MRI Cervical Spine Demyelinating W W/O Contrast   Final Result   Abnormal      Examination mildly degraded by patient motion artifact.      2 solidly enhancing lesions in the supratentorial white matter as above.  Two additional subcentimeter nonenhancing lesions elsewhere.  Although not entirely specific, findings concerning for demyelinating disease (such as multiple sclerosis) with areas of active demyelination.  Lymphoma or other process not entirely excluded.  Clinical correlation required with follow-up suggested      Cervical spine appears within normal limits.  No cervical cord lesions identified.      This report was flagged in Epic as abnormal.         Electronically signed by: Levon Sierra MD   Date:    06/11/2020   Time:    10:13      CT Head Without Contrast   Final Result      No acute intracranial abnormality detected. If persistent neurological symptoms, recommend MRI of the brain with diffusion-weighted sequencing.         Electronically signed by: Lianet Avila   Date:    06/10/2020   Time:    18:56      MRI Thoracic Spine W WO Cont    (Results Pending)       Labs: All labs reviewed by me.  Lab Results   Component Value Date    WBC 13.11 (H) 06/11/2020    HGB 13.3 06/11/2020    HCT 41.0 06/11/2020     06/11/2020    CHOL 232 (H) 06/10/2020    TRIG 162 (H) 06/10/2020    HDL 50 06/10/2020    LDLCALC 149.6 06/10/2020    ALT 25 06/11/2020    AST 18 06/11/2020     06/11/2020    K 3.9 06/11/2020     06/11/2020    CREATININE 0.7 06/11/2020    BUN 10 06/11/2020    TSH 0.565 06/10/2020    INR 0.9 06/11/2020    HGBA1C  5.1 06/11/2020         History:   Past Medical History:   Diagnosis Date    Anxiety     Depression     H/O LEEP      Past Surgical History:   Procedure Laterality Date    TONSILLECTOMY       History reviewed. No pertinent family history.  Social History     Socioeconomic History    Marital status: Single     Spouse name: Not on file    Number of children: Not on file    Years of education: Not on file    Highest education level: Not on file   Occupational History    Not on file   Social Needs    Financial resource strain: Not on file    Food insecurity:     Worry: Not on file     Inability: Not on file    Transportation needs:     Medical: Not on file     Non-medical: Not on file   Tobacco Use    Smoking status: Current Every Day Smoker     Packs/day: 0.50   Substance and Sexual Activity    Alcohol use: Yes     Frequency: Never     Comment: social    Drug use: Never    Sexual activity: Not on file   Lifestyle    Physical activity:     Days per week: Not on file     Minutes per session: Not on file    Stress: Not on file   Relationships    Social connections:     Talks on phone: Not on file     Gets together: Not on file     Attends Sabianist service: Not on file     Active member of club or organization: Not on file     Attends meetings of clubs or organizations: Not on file     Relationship status: Not on file   Other Topics Concern    Not on file   Social History Narrative    Not on file       Allergy Information:        I have reviewed the patient's allergies.       Gluten protein and Soy    Home Medications:   No current facility-administered medications on file prior to encounter.      Current Outpatient Medications on File Prior to Encounter   Medication Sig Dispense Refill    citalopram hydrobromide (CITALOPRAM ORAL) Take by mouth.         Hospital Medications Reviewed in EPIC   citalopram  10 mg Oral Daily    nicotine  1 patch Transdermal Daily       Review of Systems:    Review of  Systems   Constitutional: Negative for chills, fever and weight loss.   HENT: Negative for hearing loss and tinnitus.    Eyes: Negative for blurred vision and double vision.   Respiratory: Negative for cough and hemoptysis.    Cardiovascular: Negative for chest pain and palpitations.   Gastrointestinal: Negative for heartburn, nausea and vomiting.   Genitourinary: Negative for dysuria and urgency.   Musculoskeletal: Negative for myalgias.   Skin: Negative for itching and rash.   Neurological: Positive for sensory change, focal weakness and headaches.   Endo/Heme/Allergies: Negative for environmental allergies. Does not bruise/bleed easily.   Psychiatric/Behavioral: Positive for depression. Negative for hallucinations and suicidal ideas.       Physical Examination:  Vital signs in last 24 hours:  Temp:  [97.9 °F (36.6 °C)-98.6 °F (37 °C)] 98.5 °F (36.9 °C)  Pulse:  [] 75  Resp:  [18-20] 18  SpO2:  [94 %-97 %] 97 %  BP: ()/(58-80) 111/75      GENERAL:  General appearance: Well, non-toxic appearing.  No apparent distress.  Peripheral pulses: normal.  Extremities: normal.    MENTAL STATUS:  Alertness, attention span & concentration: normal.  Language: normal.  Orientation to self, place & time:  normal.  Memory, recent & remote: normal.  Fund of knowledge: normal.    SPEECH:  Clear and fluent.  Follows complex commands.    CRANIAL NERVES:  Cranial Nerves II-XII were examined.  II - Visual fields: normal.  III, IV, VI: PERRL, EOMI, No ptosis, No nystagmus.  V - Facial sensation: normal.  VII - Face symmetry & mobility: normal.  VIII - Hearing: normal.  IX, X - Palate: mobile & midline.  XI - Shoulder shrug: normal.  XII - Tongue protrusion: normal.    GROSS MOTOR:  Gait & station: normal.  Tone: normal.  Abnormal movements: none.  Finger-nose & Heel-knee-shin: normal.  Rapid alternating movements & drift: normal.    MUSCLE STRENGTH:     Fascics Atrophy RIGHT    LEFT Atrophy Fascics     5 Neck Ext. 5       5  Neck Flex 5       5 Deltoids 5       5 Sh.Ext.Rot. 5       5 Sh.Int.Rot. 5       5 Biceps 5       5 Triceps 5       5 Forearm.Pr. 5       5 Wrist Ext. 5       5 Wrist Flex 5       5 Finger Ext. 5       5 Finger Flex 5       5 FPL 5       5 Inteross. 5       5 FDI 5                5 Iliopsoas 5       5 Hip Abduct 5       5 Hip Adduct 5       5 Quads 5       5 Hams 5       5 Dorsiflex 5       5 Plantar Flex 5       5 Ankle Shyam 5       5 Ankle Invert 5       5 Toe Ext. 5       5 Toe Flex 5                         REFLEXES:    RIGHT Reflex   LEFT   3+ Biceps 3+   3+ Brachiorad. 3+   3+ Triceps 3+        3+ Patellar 3+   3+ Ankle 3+        Mute PLANTAR Mute     SENSORY:  Light touch: Normal throughout.  Sharp touch: Normal throughout.  Vibration:   Fifteen seconds at the great toes  Normal throughout  Temperature: Normal throughout.  Joint Position: Intact to low amplitude changes at the right great toe but diminished at the left great toe.  Intact to high amplitude change at the left great toe     40 minutes spent face-to-face, >50% spent advising about: counseling and/or coordination of care           Wandy Avalos M.D    Medicine-Neurology, Clinical Neurophysiology    This note was created with voice recognition software.  Grammatical, syntax and spelling errors may be inevitable.

## 2020-06-11 NOTE — PLAN OF CARE
Problem: Physical Therapy Goal  Goal: Physical Therapy Goal  Outcome: Met     PT orders received. Evaluation completed. Pt is independent for ambulation and transfers. Pt demonstrates normal gait pattern and normal balance. Pt expressed confidence in all aspects of her mobility. No acute PT needs identified at this time. Will d/c PT. Recommend discharge to home.

## 2020-06-11 NOTE — PT/OT/SLP EVAL
Occupational Therapy   Evaluation and Discharge Note    Name: Cari Barillas  MRN: [id number]  Admitting Diagnosis:  Brain lesion      Recommendations:     Discharge Recommendations: home  Discharge Equipment Recommendations:  none  Barriers to discharge:  None    Assessment:     Cari Barillas is a 34 y.o. female with a medical diagnosis of Brain lesion. Pt is having tingling in left LE only during OT evaluation.  At this time, patient is functioning at their prior level of function and does not require further acute OT services.     Plan:     During this hospitalization, patient does not require further acute OT services.  Please re-consult if situation changes.    · Plan of Care Reviewed with: patient    Subjective     Chief Complaint: Left LE tingling  Patient/Family Comments/goals: Return to her normal state of health.    Occupational Profile:  Living Environment: Lives with roommate in single story home, 4 steps to enter, right handrail going up, tub/shower   Previous level of function: Indep  Roles and Routines: Works as a spirit (alcohol) expert selling to businesses  Equipment Used at home:  none  Assistance upon Discharge: None    Pain/Comfort:  · Pain Rating 1: 0/10  · Pain Rating Post-Intervention 1: 0/10    Patients cultural, spiritual, Church conflicts given the current situation: (None stated.  Pt did state that she did not want Pastoral care visit.)    Objective:     Communicated with: nurse, Maricarmen, prior to session.  Patient found HOB elevated with peripheral IV upon OT entry to room.    General Precautions: Standard, (Recommend supervision when pt showers for safety.)   Orthopedic Precautions:N/A   Braces: N/A     Occupational Performance:    Bed Mobility:    · Patient completed Supine to Sit with independence  · Patient completed Sit to Supine with independence    Functional Mobility/Transfers:  · Patient completed Sit <> Stand Transfer with independence  with  no assistive device   · Patient  completed Toilet Transfer Step Transfer technique with independence with  no AD  · Functional Mobility: No LOB. Pt reports she feels that her left LE tingling is not effecting her balance.    Activities of Daily Living:  · Upper Body Dressing: independence    · Lower Body Dressing: independence    · Toileting: independence      Cognitive/Visual Perceptual:  Cognitive/Psychosocial Skills:     -       Oriented to: Person, Place, Time and Situation   -       Follows Commands/attention:Follows multistep  commands  -       Communication: clear/fluent  -       Memory: No Deficits noted  -       Safety awareness/insight to disability: intact   -       Mood/Affect/Coping skills/emotional control: Cooperative and Pleasant  Visual/Perceptual:  No deficits noted    Physical Exam:  Balance: Good in sitting and standing  Skin integrity: Visible skin intact  Sensation: Light touch intact; tingling left LE  Motor Planning: Intact  Upper Extremity Range of Motion:  WNL  Upper Extremity Strength: WNL  Fine Motor Coordination: WNL  Gross motor coordination: WNL    AMPAC 6 Click ADL:  AMPAC Total Score: 23    Treatment & Education:  Role of OT, no further skilled OT needs  Education:    Patient left HOB elevated with all lines intact, call button in reach and nurse notified. OT went to get pt a comb at the end of evaluation.  When OT came back to give pt a comb, she was standing in the bathroom crying and stating that the uncertainty of her health is overwhelming.  OT encouraged pt and had pt get back into bed and OT left to get pt a box of tissues.  OT informed nurse of pt crying and nurse agreed to deliver facial tissue and speak to pt about her emotional state.    GOALS:   Multidisciplinary Problems     Occupational Therapy Goals        Problem: Occupational Therapy Goal    Goal Priority Disciplines Outcome Interventions   Occupational Therapy Goal     OT, PT/OT     Description:  Orders received and chart reviewed.  Evaluation  complete and pt performing self care tasks at Indep level.  Recommending pt have supervision during showering as precaution.  Pt without skilled OT needs at this time.  Recommend discharge to home when medically cleared.                    History:     Past Medical History:   Diagnosis Date    Anxiety     Depression     H/O LEEP        Past Surgical History:   Procedure Laterality Date    TONSILLECTOMY         Time Tracking:     OT Date of Treatment: 06/11/20  OT Start Time: 1300  OT Stop Time: 1318  OT Total Time (min): 18 min    Billable Minutes:Evaluation 18    TERRY Emmanuel  6/11/2020

## 2020-06-11 NOTE — HPI
"Ms. Cari Barillas is a 34 y.o. female, with PMH of anxiety, who presented to [facility] [place] ED on 6/10/20 with left upper and lower extremity paresthesias. She states the LLE sensation loss started on Monday with a "pins and needles" sensation, and has persisted since that time. Upon awakening today, she noted "pins and needles" of the LUE. Associated symptoms include a feeling of "heaviness" of both extremities. She denies trauma or injury to either extremity, vision changes, headache, fever, chills, neck/back pain, SOB, decreased  strength, change in gait. She was evaluated in the ED with a CT of the Head that showed no acute intracranial abnormalities. Neuro was consulted and recommended MRA/MRI in the AM. She is placed on OBS.   "

## 2020-06-11 NOTE — H&P
"[facility] Medicine  History & Physical    Patient Name: Cari Barillas  MRN: [id number]  Admission Date: 6/10/2020  Attending Physician: CHERELLE Eid MD   Primary Care Provider: Primary Doctor No         Patient information was obtained from patient, past medical records and ER records.     Subjective:     Principal Problem:Paresthesia of left upper and lower extremity    Chief Complaint:   Chief Complaint   Patient presents with    Numbness     began in the L foot yesterday that has now travelled up the entire L side of the body which she noticed at 0645 today. Pins and needles feeling        HPI: Ms. Cari Barillas is a 34 y.o. female, with PMH of anxiety, who [place] [facility] [place] with left upper and lower extremity paresthesias. She states the LLE sensation loss started on Monday with a "pins and needles" sensation, and has persisted since that time. Upon awakening today, she noted "pins and needles" of the LUE. Associated symptoms include a feeling of "heaviness" of both extremities. She denies trauma or injury to either extremity, vision changes, headache, fever, chills, neck/back pain, SOB, decreased  strength, change in gait. She was evaluated in the ED with a CT of the Head that showed no acute intracranial abnormalities. Neuro was consulted and recommended MRA/MRI in the AM. She is placed on OBS.     Past Medical History:   Diagnosis Date    Anxiety     Depression     H/O LEEP        Past Surgical History:   Procedure Laterality Date    TONSILLECTOMY         Review of patient's allergies indicates:   Allergen Reactions    Gluten protein     Soy        No current facility-administered medications on file prior to encounter.      Current Outpatient Medications on File Prior to Encounter   Medication Sig    citalopram hydrobromide (CITALOPRAM ORAL) Take by mouth.     Family History     None        Tobacco Use    Smoking status: Current Every Day Smoker "     Packs/day: 0.50   Substance and Sexual Activity    Alcohol use: Yes     Frequency: Never     Comment: social    Drug use: Never    Sexual activity: Not on file     Review of Systems   Constitutional: Negative for diaphoresis and fever.   Respiratory: Negative for cough, shortness of breath and wheezing.    Cardiovascular: Negative for chest pain, palpitations and leg swelling.   Gastrointestinal: Negative for abdominal pain, constipation, diarrhea, nausea and vomiting.   Genitourinary: Negative for dysuria, flank pain, frequency, hematuria and urgency.   Musculoskeletal: Negative for back pain, gait problem, myalgias, neck pain and neck stiffness.   Skin: Negative for color change, pallor, rash and wound.   Neurological: Positive for weakness and numbness. Negative for dizziness, syncope, light-headedness and headaches.   Psychiatric/Behavioral: Negative for confusion and decreased concentration.     Objective:     Vital Signs (Most Recent):  Temp: 98 °F (36.7 °C) (06/11/20 0515)  Pulse: 73 (06/11/20 0515)  Resp: 18 (06/11/20 0515)  BP: 113/73 (06/11/20 0515)  SpO2: 97 % (06/11/20 0515) Vital Signs (24h Range):  Temp:  [98 °F (36.7 °C)-98.6 °F (37 °C)] 98 °F (36.7 °C)  Pulse:  [] 73  Resp:  [18-20] 18  SpO2:  [94 %-97 %] 97 %  BP: (113-130)/(69-80) 113/73     Weight: 86.5 kg (190 lb 11.2 oz)  Body mass index is 32.73 kg/m².    Physical Exam   Constitutional: She is oriented to person, place, and time. She appears well-developed and well-nourished. No distress.   HENT:   Head: Normocephalic and atraumatic.   Eyes: Pupils are equal, round, and reactive to light. Conjunctivae and EOM are normal. No scleral icterus.   Neck: Normal range of motion. Neck supple. No tracheal deviation present.   Cardiovascular: Normal rate, regular rhythm, normal heart sounds and intact distal pulses. Exam reveals no gallop and no friction rub.   No murmur heard.  Pulmonary/Chest: Effort normal and breath sounds normal. No  stridor. No respiratory distress. She has no wheezes. She has no rales.   Abdominal: Soft. Bowel sounds are normal. She exhibits no distension. There is no tenderness. There is no guarding.   Neurological: She is alert and oriented to person, place, and time. A sensory deficit (reported in left forarm/hand and left leg below the knee) is present. No cranial nerve deficit. Coordination normal.   Skin: Skin is warm and dry. She is not diaphoretic. No pallor.   Psychiatric: She has a normal mood and affect. Her behavior is normal. Judgment and thought content normal.   Nursing note and vitals reviewed.        CRANIAL NERVES     CN III, IV, VI   Pupils are equal, round, and reactive to light.  Extraocular motions are normal.        Significant Labs:   BMP:   Recent Labs   Lab 06/10/20  1737   *      K 4.0      CO2 19*   BUN 8   CREATININE 0.8   CALCIUM 9.7     CBC:   Recent Labs   Lab 06/10/20  1737   WBC 13.37*   HGB 15.0   HCT 45.6        CMP:   Recent Labs   Lab 06/10/20  1737      K 4.0      CO2 19*   *   BUN 8   CREATININE 0.8   CALCIUM 9.7   PROT 7.4   ALBUMIN 3.8   BILITOT 0.4   ALKPHOS 95   AST 23   ALT 30   ANIONGAP 13   EGFRNONAA >60     Urine Culture: No results for input(s): LABURIN in the last 48 hours.  Urine Studies:   Recent Labs   Lab 06/10/20  1744   COLORU Yellow   APPEARANCEUA Clear   PHUR 6.0   SPECGRAV >=1.030*   PROTEINUA Negative   GLUCUA Negative   KETONESU Trace*   BILIRUBINUA 1+*   OCCULTUA Negative   NITRITE Negative   UROBILINOGEN Negative   LEUKOCYTESUR Negative     All pertinent labs within the past 24 hours have been reviewed.    Significant Imaging: I have reviewed all pertinent imaging results/findings within the past 24 hours.   Imaging Results          CT Head Without Contrast (Final result)  Result time 06/10/20 18:56:00    Final result by Lianet Avila MD (06/10/20 18:56:00)                 Impression:      No acute intracranial  abnormality detected. If persistent neurological symptoms, recommend MRI of the brain with diffusion-weighted sequencing.      Electronically signed by: Lianet Avila  Date:    06/10/2020  Time:    18:56             Narrative:    EXAMINATION:  CT OF THE HEAD WITHOUT    CLINICAL HISTORY:  Focal neuro deficit, new, fixed or worsening, >6 hours;    TECHNIQUE:  5 mm unenhanced axial images were obtained from the skull base to the vertex.    COMPARISON:  None.    FINDINGS:  The ventricles, basal cisterns, and cortical sulci are within normal limits for patient's stated age. There is no acute intracranial hemorrhage, territorial infarct or mass effect, or midline shift. The visualized paranasal sinuses and mastoid air cells are clear.                                 Assessment/Plan:     * Paresthesia of left upper and lower extremity  - Ms. Cari Barillas is placed on OBS   - she has persistant paresthesias x Monday of the LLE, and x 1 day of the LUE   - CT head negative for acute findings  - MRA/MRIs ordered for AM per Neuro recs to ED PA  - [facility] orders for TIA in place for further evaluation   - monitor     Hyperglycemia  - elevated serum glucose upon arrival  - A1C ordered for AM       Dehydration  - UA w/ elevated specific gravity & trace ketones  - suspect this is also causing an apparent increase in WBC count  - no ROBERT on labs   - NS ordered overnight x 12 hours for hydration   - monitor AM labs       VTE Risk Mitigation (From admission, onward)         Ordered     enoxaparin injection 40 mg  Daily      06/10/20 2315     IP VTE HIGH RISK PATIENT  Once      06/10/20 2315     Place sequential compression device  Until discontinued      06/10/20 2315                   Gloria Ratliff PA-C  Department of Hospital Medicine   [facility], [place]

## 2020-06-11 NOTE — PLAN OF CARE
SW unable to complete discharge assessment, pt not in room, nurse stated pt was having a MRI, will visit pt again later.

## 2020-06-11 NOTE — PLAN OF CARE
Problem: Adult Inpatient Plan of Care  Goal: Plan of Care Review  Outcome: Ongoing, Progressing     Problem: Cerebral Tissue Perfusion Risk (Stroke, Ischemic/Transient Ischemic Attack)  Goal: Optimal Cerebral Tissue Perfusion  Outcome: Ongoing, Progressing     Problem: Pain (Stroke, Ischemic/Transient Ischemic Attack)  Goal: Acceptable Pain Control  Outcome: Ongoing, Progressing     Problem: Functional Ability Impaired (Stroke, Ischemic/Transient Ischemic Attack)  Goal: Optimal Functional Ability  Outcome: Ongoing, Progressing

## 2020-06-11 NOTE — ASSESSMENT & PLAN NOTE
- Ms. Cari Barillas is placed on OBS   - she has persistant paresthesias x Monday of the LLE, and x 1 day of the LUE   - CT head negative for acute findings  - MRA/MRIs ordered for AM per Neuro recs to ED PA  - [facility] orders for TIA in place for further evaluation   - monitor

## 2020-06-11 NOTE — NURSING TRANSFER
Nursing Transfer Note      6/10/2020    Transfer To: 318 from ED    Transfer via wheelchair    Transfer with cardiac monitoring    Transported by tech from ED    Medicines sent: none    Chart send with patient: Yes    Notified: friend    Patient reassessed at: 06/10/2020 @ 7700    Upon arrival to floor: cardiac monitor applied, patient oriented to room, call bell in reach and bed in lowest position

## 2020-06-11 NOTE — NURSING
Report given to oncoming nurse at bedside. NAD noted. Safety precautions maintained. Call bell in reach.   Chart check completed.

## 2020-06-11 NOTE — PLAN OF CARE
Recommendations     1. Encourage PO intake of meals.   2. If PO intake <50% of meals recommend Boost Plus BID.   3. Weekly weights.      Goals: 1.>75% of EEN, EPN by RD follow up.   Nutrition Goal Status: new  Communication of RD Recs: other (comment)(POC)

## 2020-06-11 NOTE — SUBJECTIVE & OBJECTIVE
Past Medical History:   Diagnosis Date    Anxiety     Depression     H/O LEEP        Past Surgical History:   Procedure Laterality Date    TONSILLECTOMY         Review of patient's allergies indicates:   Allergen Reactions    Gluten protein     Soy        No current facility-administered medications on file prior to encounter.      Current Outpatient Medications on File Prior to Encounter   Medication Sig    citalopram hydrobromide (CITALOPRAM ORAL) Take by mouth.     Family History     None        Tobacco Use    Smoking status: Current Every Day Smoker     Packs/day: 0.50   Substance and Sexual Activity    Alcohol use: Yes     Frequency: Never     Comment: social    Drug use: Never    Sexual activity: Not on file     Review of Systems   Constitutional: Negative for diaphoresis and fever.   Respiratory: Negative for cough, shortness of breath and wheezing.    Cardiovascular: Negative for chest pain, palpitations and leg swelling.   Gastrointestinal: Negative for abdominal pain, constipation, diarrhea, nausea and vomiting.   Genitourinary: Negative for dysuria, flank pain, frequency, hematuria and urgency.   Musculoskeletal: Negative for back pain, gait problem, myalgias, neck pain and neck stiffness.   Skin: Negative for color change, pallor, rash and wound.   Neurological: Positive for weakness and numbness. Negative for dizziness, syncope, light-headedness and headaches.   Psychiatric/Behavioral: Negative for confusion and decreased concentration.     Objective:     Vital Signs (Most Recent):  Temp: 98 °F (36.7 °C) (06/11/20 0515)  Pulse: 73 (06/11/20 0515)  Resp: 18 (06/11/20 0515)  BP: 113/73 (06/11/20 0515)  SpO2: 97 % (06/11/20 0515) Vital Signs (24h Range):  Temp:  [98 °F (36.7 °C)-98.6 °F (37 °C)] 98 °F (36.7 °C)  Pulse:  [] 73  Resp:  [18-20] 18  SpO2:  [94 %-97 %] 97 %  BP: (113-130)/(69-80) 113/73     Weight: 86.5 kg (190 lb 11.2 oz)  Body mass index is 32.73 kg/m².    Physical Exam    Constitutional: She is oriented to person, place, and time. She appears well-developed and well-nourished. No distress.   HENT:   Head: Normocephalic and atraumatic.   Eyes: Pupils are equal, round, and reactive to light. Conjunctivae and EOM are normal. No scleral icterus.   Neck: Normal range of motion. Neck supple. No tracheal deviation present.   Cardiovascular: Normal rate, regular rhythm, normal heart sounds and intact distal pulses. Exam reveals no gallop and no friction rub.   No murmur heard.  Pulmonary/Chest: Effort normal and breath sounds normal. No stridor. No respiratory distress. She has no wheezes. She has no rales.   Abdominal: Soft. Bowel sounds are normal. She exhibits no distension. There is no tenderness. There is no guarding.   Neurological: She is alert and oriented to person, place, and time. A sensory deficit (reported in left forarm/hand and left leg below the knee) is present. No cranial nerve deficit. Coordination normal.   Skin: Skin is warm and dry. She is not diaphoretic. No pallor.   Psychiatric: She has a normal mood and affect. Her behavior is normal. Judgment and thought content normal.   Nursing note and vitals reviewed.        CRANIAL NERVES     CN III, IV, VI   Pupils are equal, round, and reactive to light.  Extraocular motions are normal.        Significant Labs:   BMP:   Recent Labs   Lab 06/10/20  1737   *      K 4.0      CO2 19*   BUN 8   CREATININE 0.8   CALCIUM 9.7     CBC:   Recent Labs   Lab 06/10/20  1737   WBC 13.37*   HGB 15.0   HCT 45.6        CMP:   Recent Labs   Lab 06/10/20  1737      K 4.0      CO2 19*   *   BUN 8   CREATININE 0.8   CALCIUM 9.7   PROT 7.4   ALBUMIN 3.8   BILITOT 0.4   ALKPHOS 95   AST 23   ALT 30   ANIONGAP 13   EGFRNONAA >60     Urine Culture: No results for input(s): LABURIN in the last 48 hours.  Urine Studies:   Recent Labs   Lab 06/10/20  1744   COLORU Yellow   APPEARANCEUA Clear   PHUR 6.0    SPECGRAV >=1.030*   PROTEINUA Negative   GLUCUA Negative   KETONESU Trace*   BILIRUBINUA 1+*   OCCULTUA Negative   NITRITE Negative   UROBILINOGEN Negative   LEUKOCYTESUR Negative     All pertinent labs within the past 24 hours have been reviewed.    Significant Imaging: I have reviewed all pertinent imaging results/findings within the past 24 hours.   Imaging Results          CT Head Without Contrast (Final result)  Result time 06/10/20 18:56:00    Final result by Lianet Avila MD (06/10/20 18:56:00)                 Impression:      No acute intracranial abnormality detected. If persistent neurological symptoms, recommend MRI of the brain with diffusion-weighted sequencing.      Electronically signed by: Lianet Avila  Date:    06/10/2020  Time:    18:56             Narrative:    EXAMINATION:  CT OF THE HEAD WITHOUT    CLINICAL HISTORY:  Focal neuro deficit, new, fixed or worsening, >6 hours;    TECHNIQUE:  5 mm unenhanced axial images were obtained from the skull base to the vertex.    COMPARISON:  None.    FINDINGS:  The ventricles, basal cisterns, and cortical sulci are within normal limits for patient's stated age. There is no acute intracranial hemorrhage, territorial infarct or mass effect, or midline shift. The visualized paranasal sinuses and mastoid air cells are clear.

## 2020-06-11 NOTE — ASSESSMENT & PLAN NOTE
Contributing Nutrition Diagnosis  Inadequate energy intake    Related to (etiology):   Decreased appetite    Signs and Symptoms (as evidenced by):   PO intake <75% of meals    Interventions (treatment strategy):  Collaboration with other providers    Nutrition Diagnosis Status:   New

## 2020-06-11 NOTE — PROGRESS NOTES
"[facility] Medicine  Progress Note    Patient Name: Cari Barillas  MRN: [id number]  Patient Class: IP- Inpatient   Admission Date: 6/10/2020  Length of Stay: 0 days  Attending Physician: CHERELLE Eid MD  Primary Care Provider: Primary Doctor No        Subjective:     Principal Problem:Brain lesion        HPI:  Ms. Cari Barillas is a 34 y.o. female, with PMH of anxiety, who [place] [facility] [place] with left upper and lower extremity paresthesias. She states the LLE sensation loss started on Monday with a "pins and needles" sensation, and has persisted since that time. Upon awakening today, she noted "pins and needles" of the LUE. Associated symptoms include a feeling of "heaviness" of both extremities. She denies trauma or injury to either extremity, vision changes, headache, fever, chills, neck/back pain, SOB, decreased  strength, change in gait. She was evaluated in the ED with a CT of the Head that showed no acute intracranial abnormalities. Neuro was consulted and recommended MRA/MRI in the AM. She is placed on OBS.     Overview/Hospital Course:  No notes on file    Interval History: in no distress, eating lunch, tearful    Review of Systems   Constitutional: Negative for diaphoresis and fever.   Respiratory: Negative for cough and shortness of breath.    Cardiovascular: Negative for chest pain, palpitations and leg swelling.   Gastrointestinal: Negative for abdominal pain, constipation, diarrhea, nausea and vomiting.   Musculoskeletal: Negative for back pain, gait problem, myalgias, neck pain and neck stiffness.   Skin: Negative.  Negative for wound.   Neurological: Positive for weakness and numbness. Negative for dizziness, syncope, light-headedness and headaches.     Objective:     Vital Signs (Most Recent):  Temp: 98.5 °F (36.9 °C) (06/11/20 1312)  Pulse: 75 (06/11/20 1312)  Resp: 18 (06/11/20 1312)  BP: 111/75 (06/11/20 1312)  SpO2: 97 % (06/11/20 1312) Vital " Signs (24h Range):  Temp:  [97.9 °F (36.6 °C)-98.6 °F (37 °C)] 98.5 °F (36.9 °C)  Pulse:  [] 75  Resp:  [18-20] 18  SpO2:  [94 %-97 %] 97 %  BP: ()/(58-80) 111/75     Weight: 86.5 kg (190 lb 11.2 oz)  Body mass index is 32.73 kg/m².    Intake/Output Summary (Last 24 hours) at 6/11/2020 1423  Last data filed at 6/11/2020 1400  Gross per 24 hour   Intake 1251.17 ml   Output --   Net 1251.17 ml      Physical Exam   Constitutional: She is oriented to person, place, and time. She appears well-developed and well-nourished. No distress.   HENT:   Head: Normocephalic and atraumatic.   Eyes: Conjunctivae are normal. No scleral icterus.   Neck: Normal range of motion. Neck supple. No tracheal deviation present.   Cardiovascular: Normal rate, regular rhythm, normal heart sounds and intact distal pulses.   Pulmonary/Chest: Effort normal and breath sounds normal.   Abdominal: Soft. Bowel sounds are normal. She exhibits no distension. There is no tenderness. There is no guarding.   Neurological: She is alert and oriented to person, place, and time. A sensory deficit (reported in left forarm/hand and left leg below the knee) is present. No cranial nerve deficit. Coordination normal.   Skin: Skin is warm and dry. She is not diaphoretic. No pallor.   Nursing note and vitals reviewed.      Significant Labs:   CBC:   Recent Labs   Lab 06/10/20  1737 06/11/20  0554   WBC 13.37* 13.11*   HGB 15.0 13.3   HCT 45.6 41.0    276     CMP:   Recent Labs   Lab 06/10/20  1737 06/11/20  0554    138   K 4.0 3.9    108   CO2 19* 20*   * 84   BUN 8 10   CREATININE 0.8 0.7   CALCIUM 9.7 8.7   PROT 7.4 6.3   ALBUMIN 3.8 3.3*   BILITOT 0.4 0.4   ALKPHOS 95 79   AST 23 18   ALT 30 25   ANIONGAP 13 10   EGFRNONAA >60 >60     All pertinent labs within the past 24 hours have been reviewed.    Significant Imaging: I have reviewed all pertinent imaging results/findings within the past 24 hours.   Imaging Results           CT Head Without Contrast (Final result)  Result time 06/10/20 18:56:00    Final result by Lianet Avila MD (06/10/20 18:56:00)                 Impression:      No acute intracranial abnormality detected. If persistent neurological symptoms, recommend MRI of the brain with diffusion-weighted sequencing.      Electronically signed by: Lianet Avila  Date:    06/10/2020  Time:    18:56             Narrative:    EXAMINATION:  CT OF THE HEAD WITHOUT    CLINICAL HISTORY:  Focal neuro deficit, new, fixed or worsening, >6 hours;    TECHNIQUE:  5 mm unenhanced axial images were obtained from the skull base to the vertex.    COMPARISON:  None.    FINDINGS:  The ventricles, basal cisterns, and cortical sulci are within normal limits for patient's stated age. There is no acute intracranial hemorrhage, territorial infarct or mass effect, or midline shift. The visualized paranasal sinuses and mastoid air cells are clear.                                    Assessment/Plan:      * Multiple enhancing brain lesions  - she has persistant paresthesias x Monday of the LLE, and x 1 day of the LUE   - concern for MS vs other  - CT head negative for acute findings  - MRA/MRIs 2 solidly enhancing lesions in the supratentorial white matter; Two additional subcentimeter nonenhancing lesions elsewhere.  Although not entirely specific, findings concerning for demyelinating disease (such as multiple sclerosis) with areas of active demyelination.  Lymphoma or other process not entirely excluded  - Neuro consulted   - plan for LP CSF studies ordered; Lumbar puncture, send CSF for cell count/protein/glucose, cytology, bacterial culture, fungal culture, HSV PCR, EBV PCR, ACE, CSF IgG index, oligoclonal bands  - check vitamin B1, B12, TSH, vitamin D, B6, LIZA, Ace, HIV, folic acid, RPR, PETH level   - will start 1 g IV daily after the lumbar puncture is done  - d/w Neuro      Chronic alcohol use  - check B12, thiamine  - reports drinking  occasionally during the week      Dehydration  - s/p IVF's, improved      Hyperglycemia  - on admission, A1c 5.1, likely non-fasting, resolved      VTE Risk Mitigation (From admission, onward)         Ordered     IP VTE HIGH RISK PATIENT  Once      06/10/20 2315     Place sequential compression device  Until discontinued      06/10/20 2315                      Janny Covington PA-C  Department of Hospital Medicine   [facility]

## 2020-06-11 NOTE — PT/OT/SLP EVAL
Physical Therapy Evaluation and Discharge Note    Patient Name:  Cari Barillas   MRN:  [id number]    Recommendations:     Discharge Recommendations:  home   Discharge Equipment Recommendations: none   Barriers to discharge: None    Assessment:     Cari Barillas is a 34 y.o. female admitted with a medical diagnosis of Paresthesia of left upper and lower extremity. PT orders received. Evaluation completed. Pt is independent for ambulation and transfers. Pt demonstrates normal gait pattern and normal balance. Pt expressed confidence in all aspects of her mobility.  At this time, patient is functioning at their prior level of function and does not require further acute PT services. Recommend discharge to home.    Recent Surgery: * No surgery found *      Plan:     During this hospitalization, patient does not require further acute PT services.  Please re-consult if situation changes.      Subjective     Chief Complaint: Tingling to LLE and LUE  Patient/Family Comments/goals: No goal stated.  Pain/Comfort:  · Pain Rating 1: 0/10  · Pain Rating Post-Intervention 1: 0/10    Patients cultural, spiritual, Anglican conflicts given the current situation: no(None stated)    Living Environment:  · Pt lives with a roommate in a single story house with 4 steps to enter and right handrail(s).   · Pt has a tub shower.   · DME owned: None  · Upon discharge, patient will not have assistance at home.  Prior level of function:  · Ambulation: Independent  · ADL's: Independent  · IADLs: Independent  · Falls: None reported.    Objective:     Communicated with RN (Maricarmen) prior to session.  Patient found supine with peripheral IV upon PT entry to room.    General Precautions: Standard  Orthopedic Precautions:N/A   Braces: N/A     Exams:  · Cognition:   · Patient is oriented to person, place, time, and situation.  · Pt follows approximately 100% of multiple-step commands.    · Mood: Pleasant and  cooperative.  · Musculoskeletal:  · Posture:    · In sitting: WNL  · In standing: WNL  · LE ROM/Strength:   · R ROM: No deficits  · L ROM: No deficits  · R Strength:   · Hip flexion: 5/5  · Knee extension: 5/5  · Dorsiflexion: 5/5   · L Strength:   · Hip flexion: 5/5  · Knee extension: 5/5  · Dorsiflexion: 5/5   · Neuromuscular:  · Sensation: Intact to light touch bilateral LEs. Reports tingling sensation to L lower leg.  · Tone/Reflexes: No impairments identified with functional mobility. No formal testing performed.  · Coordination:  · Heel to shin: WNL  · Toe tapping: WNL  · Balance:   · Static sitting: Independent  · Dynamic sitting: Independent  · Static standing: Independent  · Dynamic standing: Independent  · Visual-vestibular: No impairments identified with functional mobility. No formal testing performed.  · Integument: Visible skin intact    Functional Mobility: Gait belt and non-slip socks donned prior to out of bed mobility.  · Bed Mobility:     · Supine to Sit: independent  · Sit to Supine: independent  · Transfers:     · Sit to Stand:  independent with no AD  · Gait: 50 ft independently with no AD.  · Demonstrated normal gait pattern. No loss of balance.    AM-PAC 6 CLICK MOBILITY  Total Score:24     Patient left supine with all lines intact and call button in reach. Linens/blue pads under patient clean, appropriately positioned, free of wrinkles at end of session.    GOALS:   Multidisciplinary Problems     Physical Therapy Goals     Not on file          Multidisciplinary Problems (Resolved)        Problem: Physical Therapy Goal    Goal Priority Disciplines Outcome Goal Variances Interventions   Physical Therapy Goal   (Resolved)     PT, PT/OT Met                     History:     Past Medical History:   Diagnosis Date    Anxiety     Depression     H/O LEEP        Past Surgical History:   Procedure Laterality Date    TONSILLECTOMY         Time Tracking:     PT Received On: 06/11/20  PT Start Time:  0925     PT Stop Time: 0940  PT Total Time (min): 15 min     Billable Minutes: Evaluation 15      Griselda Warner, PT  06/11/2020

## 2020-06-11 NOTE — SUBJECTIVE & OBJECTIVE
Interval History: in no distress, eating lunch, tearful    Review of Systems   Constitutional: Negative for diaphoresis and fever.   Respiratory: Negative for cough and shortness of breath.    Cardiovascular: Negative for chest pain, palpitations and leg swelling.   Gastrointestinal: Negative for abdominal pain, constipation, diarrhea, nausea and vomiting.   Musculoskeletal: Negative for back pain, gait problem, myalgias, neck pain and neck stiffness.   Skin: Negative.  Negative for wound.   Neurological: Positive for weakness and numbness. Negative for dizziness, syncope, light-headedness and headaches.     Objective:     Vital Signs (Most Recent):  Temp: 98.5 °F (36.9 °C) (06/11/20 1312)  Pulse: 75 (06/11/20 1312)  Resp: 18 (06/11/20 1312)  BP: 111/75 (06/11/20 1312)  SpO2: 97 % (06/11/20 1312) Vital Signs (24h Range):  Temp:  [97.9 °F (36.6 °C)-98.6 °F (37 °C)] 98.5 °F (36.9 °C)  Pulse:  [] 75  Resp:  [18-20] 18  SpO2:  [94 %-97 %] 97 %  BP: ()/(58-80) 111/75     Weight: 86.5 kg (190 lb 11.2 oz)  Body mass index is 32.73 kg/m².    Intake/Output Summary (Last 24 hours) at 6/11/2020 1423  Last data filed at 6/11/2020 1400  Gross per 24 hour   Intake 1251.17 ml   Output --   Net 1251.17 ml      Physical Exam   Constitutional: She is oriented to person, place, and time. She appears well-developed and well-nourished. No distress.   HENT:   Head: Normocephalic and atraumatic.   Eyes: Conjunctivae are normal. No scleral icterus.   Neck: Normal range of motion. Neck supple. No tracheal deviation present.   Cardiovascular: Normal rate, regular rhythm, normal heart sounds and intact distal pulses.   Pulmonary/Chest: Effort normal and breath sounds normal.   Abdominal: Soft. Bowel sounds are normal. She exhibits no distension. There is no tenderness. There is no guarding.   Neurological: She is alert and oriented to person, place, and time. A sensory deficit (reported in left forarm/hand and left leg below the  knee) is present. No cranial nerve deficit. Coordination normal.   Skin: Skin is warm and dry. She is not diaphoretic. No pallor.   Nursing note and vitals reviewed.      Significant Labs:   CBC:   Recent Labs   Lab 06/10/20  1737 06/11/20  0554   WBC 13.37* 13.11*   HGB 15.0 13.3   HCT 45.6 41.0    276     CMP:   Recent Labs   Lab 06/10/20  1737 06/11/20  0554    138   K 4.0 3.9    108   CO2 19* 20*   * 84   BUN 8 10   CREATININE 0.8 0.7   CALCIUM 9.7 8.7   PROT 7.4 6.3   ALBUMIN 3.8 3.3*   BILITOT 0.4 0.4   ALKPHOS 95 79   AST 23 18   ALT 30 25   ANIONGAP 13 10   EGFRNONAA >60 >60     All pertinent labs within the past 24 hours have been reviewed.    Significant Imaging: I have reviewed all pertinent imaging results/findings within the past 24 hours.   Imaging Results          CT Head Without Contrast (Final result)  Result time 06/10/20 18:56:00    Final result by Lianet Avila MD (06/10/20 18:56:00)                 Impression:      No acute intracranial abnormality detected. If persistent neurological symptoms, recommend MRI of the brain with diffusion-weighted sequencing.      Electronically signed by: Lianet Avila  Date:    06/10/2020  Time:    18:56             Narrative:    EXAMINATION:  CT OF THE HEAD WITHOUT    CLINICAL HISTORY:  Focal neuro deficit, new, fixed or worsening, >6 hours;    TECHNIQUE:  5 mm unenhanced axial images were obtained from the skull base to the vertex.    COMPARISON:  None.    FINDINGS:  The ventricles, basal cisterns, and cortical sulci are within normal limits for patient's stated age. There is no acute intracranial hemorrhage, territorial infarct or mass effect, or midline shift. The visualized paranasal sinuses and mastoid air cells are clear.

## 2020-06-11 NOTE — PLAN OF CARE
SW met with pt at bedside and completed discharge assessment.  Pt doesn't have a PCP but is willing to establish care at [facility] and uses  [facility] [place] and would like bedside delivery.  Pt doesn't have a POA or LW.  Pt will drive self home.  No needs identified at this time.     06/11/20 1249   Discharge Assessment   Assessment Type Discharge Planning Assessment   Confirmed/corrected address and phone number on facesheet? Yes   Assessment information obtained from? Patient   Communicated expected length of stay with patient/caregiver no   Prior to hospitilization cognitive status: Alert/Oriented   Prior to hospitalization functional status: Independent   Current cognitive status: Alert/Oriented   Current Functional Status: Independent   Lives With friend(s)   Able to Return to Prior Arrangements yes   Is patient able to care for self after discharge? Yes   Readmission Within the Last 30 Days no previous admission in last 30 days   Patient currently being followed by outpatient case management? No   Patient currently receives any other outside agency services? No   Equipment Currently Used at Home none   Do you have any problems affording any of your prescribed medications? No   Is the patient taking medications as prescribed? yes   Does the patient have transportation home? Yes   Transportation Anticipated car, drives self   Does the patient receive services at the [facility]? No   Discharge Plan A Home   DME Needed Upon Discharge  none   Patient/Family in Agreement with Plan yes

## 2020-06-11 NOTE — PLAN OF CARE
Problem: Occupational Therapy Goal  Goal: Occupational Therapy Goal  Description  Orders received and chart reviewed.  Evaluation complete and pt performing self care tasks at Indep level.  Recommending pt have supervision during showering as precaution.  Pt without skilled OT needs at this time.  Recommend discharge to home when medically cleared.   Outcome: Met

## 2020-06-11 NOTE — NURSING
VSS on room air, alert and oriented. Ambulates to bathroom.  On a regular diet. PIV to saline lock in rt forearm. Friend visiting now and brought her food. Pt encouraged to call with any needs. Will continue to monitor.

## 2020-06-11 NOTE — TREATMENT PLAN
6/11/2020    Patient: Cari Barillas    YOB: 1985    To whom it may concern,    Cari Barillas was admitted to the hospital under my care on 6/10/2020 and will need to remain hospitalized until Monday 6/15/2020.    If you have any questions or concerns, please don't hesitate to contact the department of hospital medicine at [phone].        Sincerely,              Janny Covington PA-C  Department of Hospital Medicine  [facility]  [address] [place] [id number]

## 2020-06-12 PROBLEM — R73.9 HYPERGLYCEMIA: Status: RESOLVED | Noted: 2020-06-10 | Resolved: 2020-06-12

## 2020-06-12 LAB
ALBUMIN SERPL BCP-MCNC: 3.5 G/DL (ref 3.5–5.2)
ALP SERPL-CCNC: 85 U/L (ref 55–135)
ALT SERPL W/O P-5'-P-CCNC: 23 U/L (ref 10–44)
ANA SER QL IF: NORMAL
ANION GAP SERPL CALC-SCNC: 10 MMOL/L (ref 8–16)
AST SERPL-CCNC: 17 U/L (ref 10–40)
BASOPHILS # BLD AUTO: 0.03 K/UL (ref 0–0.2)
BASOPHILS NFR BLD: 0.3 % (ref 0–1.9)
BILIRUB SERPL-MCNC: 0.3 MG/DL (ref 0.1–1)
BUN SERPL-MCNC: 9 MG/DL (ref 6–20)
CALCIUM SERPL-MCNC: 9.3 MG/DL (ref 8.7–10.5)
CHLORIDE SERPL-SCNC: 107 MMOL/L (ref 95–110)
CLARITY CSF: CLEAR
CO2 SERPL-SCNC: 21 MMOL/L (ref 23–29)
COLOR CSF: COLORLESS
CREAT SERPL-MCNC: 0.7 MG/DL (ref 0.5–1.4)
DIFFERENTIAL METHOD: ABNORMAL
EOSINOPHIL # BLD AUTO: 0.1 K/UL (ref 0–0.5)
EOSINOPHIL NFR BLD: 1.1 % (ref 0–8)
ERYTHROCYTE [DISTWIDTH] IN BLOOD BY AUTOMATED COUNT: 12.8 % (ref 11.5–14.5)
EST. GFR  (AFRICAN AMERICAN): >60 ML/MIN/1.73 M^2
EST. GFR  (NON AFRICAN AMERICAN): >60 ML/MIN/1.73 M^2
GLUCOSE CSF-MCNC: 58 MG/DL (ref 40–70)
GLUCOSE SERPL-MCNC: 95 MG/DL (ref 70–110)
HCT VFR BLD AUTO: 41.5 % (ref 37–48.5)
HGB BLD-MCNC: 13.7 G/DL (ref 12–16)
HIV 1+2 AB+HIV1 P24 AG SERPL QL IA: NEGATIVE
IMM GRANULOCYTES # BLD AUTO: 0.06 K/UL (ref 0–0.04)
IMM GRANULOCYTES NFR BLD AUTO: 0.6 % (ref 0–0.5)
LYMPHOCYTES # BLD AUTO: 3.3 K/UL (ref 1–4.8)
LYMPHOCYTES NFR BLD: 31.4 % (ref 18–48)
LYMPHOCYTES NFR CSF MANUAL: 90 % (ref 40–80)
MCH RBC QN AUTO: 30.2 PG (ref 27–31)
MCHC RBC AUTO-ENTMCNC: 33 G/DL (ref 32–36)
MCV RBC AUTO: 91 FL (ref 82–98)
MONOCYTES # BLD AUTO: 0.6 K/UL (ref 0.3–1)
MONOCYTES NFR BLD: 5.2 % (ref 4–15)
MONOS+MACROS NFR CSF MANUAL: 5 % (ref 15–45)
NEUTROPHILS # BLD AUTO: 6.5 K/UL (ref 1.8–7.7)
NEUTROPHILS NFR BLD: 61.4 % (ref 38–73)
NEUTROPHILS NFR CSF MANUAL: 5 % (ref 0–6)
NRBC BLD-RTO: 0 /100 WBC
PLATELET # BLD AUTO: 259 K/UL (ref 150–350)
PMV BLD AUTO: 11.3 FL (ref 9.2–12.9)
POTASSIUM SERPL-SCNC: 4.2 MMOL/L (ref 3.5–5.1)
PROT CSF-MCNC: 36 MG/DL (ref 15–40)
PROT SERPL-MCNC: 6.7 G/DL (ref 6–8.4)
RBC # BLD AUTO: 4.54 M/UL (ref 4–5.4)
RBC # CSF: 296 /CU MM
RPR SER QL: NORMAL
SODIUM SERPL-SCNC: 138 MMOL/L (ref 136–145)
SPECIMEN VOL CSF: 4 ML
WBC # BLD AUTO: 10.63 K/UL (ref 3.9–12.7)
WBC # CSF: 23 /CU MM (ref 0–5)

## 2020-06-12 PROCEDURE — 25000003 PHARM REV CODE 250: Performed by: NURSE PRACTITIONER

## 2020-06-12 PROCEDURE — 11000001 HC ACUTE MED/SURG PRIVATE ROOM

## 2020-06-12 PROCEDURE — 25500020 PHARM REV CODE 255: Performed by: INTERNAL MEDICINE

## 2020-06-12 PROCEDURE — 87205 SMEAR GRAM STAIN: CPT

## 2020-06-12 PROCEDURE — 36415 COLL VENOUS BLD VENIPUNCTURE: CPT

## 2020-06-12 PROCEDURE — 88108 CYTOPATH CONCENTRATE TECH: CPT | Performed by: PATHOLOGY

## 2020-06-12 PROCEDURE — 99233 PR SUBSEQUENT HOSPITAL CARE,LEVL III: ICD-10-PCS | Mod: ,,, | Performed by: PHYSICIAN ASSISTANT

## 2020-06-12 PROCEDURE — 25000003 PHARM REV CODE 250: Performed by: PHYSICIAN ASSISTANT

## 2020-06-12 PROCEDURE — S4991 NICOTINE PATCH NONLEGEND: HCPCS | Performed by: PHYSICIAN ASSISTANT

## 2020-06-12 PROCEDURE — A9585 GADOBUTROL INJECTION: HCPCS | Performed by: INTERNAL MEDICINE

## 2020-06-12 PROCEDURE — 89051 BODY FLUID CELL COUNT: CPT

## 2020-06-12 PROCEDURE — 87070 CULTURE OTHR SPECIMN AEROBIC: CPT

## 2020-06-12 PROCEDURE — 85025 COMPLETE CBC W/AUTO DIFF WBC: CPT

## 2020-06-12 PROCEDURE — 99000 SPECIMEN HANDLING OFFICE-LAB: CPT

## 2020-06-12 PROCEDURE — 82164 ANGIOTENSIN I ENZYME TEST: CPT

## 2020-06-12 PROCEDURE — 99233 SBSQ HOSP IP/OBS HIGH 50: CPT | Mod: ,,, | Performed by: PHYSICIAN ASSISTANT

## 2020-06-12 PROCEDURE — 88108 PR  CYTOPATH FLUIDS,CONCENTRATN,INTERP: ICD-10-PCS | Mod: 26,,, | Performed by: PATHOLOGY

## 2020-06-12 PROCEDURE — 88108 CYTOPATH CONCENTRATE TECH: CPT | Mod: 26,,, | Performed by: PATHOLOGY

## 2020-06-12 PROCEDURE — 87102 FUNGUS ISOLATION CULTURE: CPT

## 2020-06-12 PROCEDURE — 87798 DETECT AGENT NOS DNA AMP: CPT

## 2020-06-12 PROCEDURE — 82945 GLUCOSE OTHER FLUID: CPT

## 2020-06-12 PROCEDURE — 87529 HSV DNA AMP PROBE: CPT

## 2020-06-12 PROCEDURE — 63600175 PHARM REV CODE 636 W HCPCS: Performed by: PHYSICIAN ASSISTANT

## 2020-06-12 PROCEDURE — 80053 COMPREHEN METABOLIC PANEL: CPT

## 2020-06-12 PROCEDURE — 84157 ASSAY OF PROTEIN OTHER: CPT

## 2020-06-12 RX ORDER — ERGOCALCIFEROL 1.25 MG/1
50000 CAPSULE ORAL
Status: DISCONTINUED | OUTPATIENT
Start: 2020-06-13 | End: 2020-06-14 | Stop reason: HOSPADM

## 2020-06-12 RX ORDER — GADOBUTROL 604.72 MG/ML
8.5 INJECTION INTRAVENOUS
Status: COMPLETED | OUTPATIENT
Start: 2020-06-12 | End: 2020-06-12

## 2020-06-12 RX ORDER — ACETAMINOPHEN 325 MG/1
650 TABLET ORAL EVERY 4 HOURS PRN
Status: DISCONTINUED | OUTPATIENT
Start: 2020-06-12 | End: 2020-06-14 | Stop reason: HOSPADM

## 2020-06-12 RX ADMIN — GADOBUTROL 8.5 ML: 604.72 INJECTION INTRAVENOUS at 06:06

## 2020-06-12 RX ADMIN — NICOTINE 1 PATCH: 21 PATCH, EXTENDED RELEASE TRANSDERMAL at 10:06

## 2020-06-12 RX ADMIN — CITALOPRAM HYDROBROMIDE 10 MG: 10 TABLET ORAL at 10:06

## 2020-06-12 RX ADMIN — LORAZEPAM 0.5 MG: 0.5 TABLET ORAL at 09:06

## 2020-06-12 RX ADMIN — DEXTROSE: 50 INJECTION, SOLUTION INTRAVENOUS at 03:06

## 2020-06-12 RX ADMIN — ACETAMINOPHEN 650 MG: 325 TABLET ORAL at 01:06

## 2020-06-12 NOTE — SUBJECTIVE & OBJECTIVE
Interval History: no overnight events    Review of Systems   Constitutional: Negative for diaphoresis and fever.   Respiratory: Negative for cough and shortness of breath.    Cardiovascular: Negative for chest pain, palpitations and leg swelling.   Gastrointestinal: Negative for abdominal pain, constipation, diarrhea, nausea and vomiting.   Musculoskeletal: Negative for back pain, gait problem, myalgias, neck pain and neck stiffness.   Skin: Negative.  Negative for wound.   Neurological: Positive for weakness and numbness. Negative for dizziness, syncope, light-headedness and headaches.     Objective:     Vital Signs (Most Recent):  Temp: 99.2 °F (37.3 °C) (06/12/20 1618)  Pulse: 84 (06/12/20 1618)  Resp: 18 (06/12/20 1618)  BP: 115/67 (06/12/20 1618)  SpO2: 96 % (06/12/20 1618) Vital Signs (24h Range):  Temp:  [97.9 °F (36.6 °C)-99.2 °F (37.3 °C)] 99.2 °F (37.3 °C)  Pulse:  [65-86] 84  Resp:  [15-20] 18  SpO2:  [95 %-98 %] 96 %  BP: ()/(56-84) 115/67     Weight: 86.5 kg (190 lb 11.2 oz)  Body mass index is 32.73 kg/m².    Intake/Output Summary (Last 24 hours) at 6/12/2020 1729  Last data filed at 6/12/2020 0100  Gross per 24 hour   Intake 360 ml   Output 1350 ml   Net -990 ml      Physical Exam   Constitutional: She is oriented to person, place, and time. She appears well-developed and well-nourished. No distress.   HENT:   Head: Normocephalic and atraumatic.   Eyes: Conjunctivae are normal. No scleral icterus.   Neck: Normal range of motion. Neck supple. No tracheal deviation present.   Cardiovascular: Normal rate, regular rhythm, normal heart sounds and intact distal pulses.   Pulmonary/Chest: Effort normal and breath sounds normal.   Abdominal: Soft. Bowel sounds are normal. She exhibits no distension. There is no tenderness. There is no guarding.   Neurological: She is alert and oriented to person, place, and time. A sensory deficit (reported in left forarm/hand and left leg below the knee) is present. No  cranial nerve deficit. Coordination normal.   Skin: Skin is warm and dry. She is not diaphoretic. No pallor.   Nursing note and vitals reviewed.      Significant Labs:   CBC:   Recent Labs   Lab 06/10/20  1737 06/11/20  0554 06/12/20  0527   WBC 13.37* 13.11* 10.63   HGB 15.0 13.3 13.7   HCT 45.6 41.0 41.5    276 259     CMP:   Recent Labs   Lab 06/10/20  1737 06/11/20  0554 06/12/20  0527    138 138   K 4.0 3.9 4.2    108 107   CO2 19* 20* 21*   * 84 95   BUN 8 10 9   CREATININE 0.8 0.7 0.7   CALCIUM 9.7 8.7 9.3   PROT 7.4 6.3 6.7   ALBUMIN 3.8 3.3* 3.5   BILITOT 0.4 0.4 0.3   ALKPHOS 95 79 85   AST 23 18 17   ALT 30 25 23   ANIONGAP 13 10 10   EGFRNONAA >60 >60 >60     All pertinent labs within the past 24 hours have been reviewed.    Significant Imaging: I have reviewed all pertinent imaging results/findings within the past 24 hours.   Imaging Results          CT Head Without Contrast (Final result)  Result time 06/10/20 18:56:00    Final result by Lianet Avila MD (06/10/20 18:56:00)                 Impression:      No acute intracranial abnormality detected. If persistent neurological symptoms, recommend MRI of the brain with diffusion-weighted sequencing.      Electronically signed by: Lianet Avila  Date:    06/10/2020  Time:    18:56             Narrative:    EXAMINATION:  CT OF THE HEAD WITHOUT    CLINICAL HISTORY:  Focal neuro deficit, new, fixed or worsening, >6 hours;    TECHNIQUE:  5 mm unenhanced axial images were obtained from the skull base to the vertex.    COMPARISON:  None.    FINDINGS:  The ventricles, basal cisterns, and cortical sulci are within normal limits for patient's stated age. There is no acute intracranial hemorrhage, territorial infarct or mass effect, or midline shift. The visualized paranasal sinuses and mastoid air cells are clear.

## 2020-06-12 NOTE — OP NOTE
[facility] [facility]In  Interventional Radiology  High Risk Procedure - Inpatient    Date: 06/12/2020 Time: 12:12 PM    Pre-Op Diagnosis: Paresthesias    Post-Op Diagnosis: same    Procedure Performed by: Kulwant Fitzpatrick MD    Assistant: None    Procedure: Fluoro guided LP     Specimen/Tissue Removed: 10 mL of crystal clear CSF    Estimated Blood Loss: Less than 5 mL    Procedure Note/Findings: Fluoro guided LP performed with 20 gauge spinal needle at L4 level. No immediate post-procedure complications noted.            Please refer to dictated report for additional details.

## 2020-06-12 NOTE — PROGRESS NOTES
Gave pt handoff report to NELSON Chaves in OR. Verbalized understanding and denies any further questions regarding pt. Pt AAOx4 and VS remain stable on room air. No complaints of acute pain at this time. Transport at bedside now with stretcher for transport. Pt provided with mask for transfer. Side rails up x 2.

## 2020-06-12 NOTE — H&P
[facility]  History & Physical - Short Stay  Interventional Radiology    SUBJECTIVE:     Chief Complaint/Reason for Admission: Paresthesias    Informant(s):  self and Electronic Health Record    History of Present Illness:  Cari Barillas is a 34 y.o. female with a history of paresthesias.    Patient presents for fluoro guided LP.    Scheduled Meds:    citalopram  10 mg Oral Daily    nicotine  1 patch Transdermal Daily     Continuous Infusions:   PRN Meds: acetaminophen, LORazepam, ondansetron, sodium chloride 0.9%    Review of patient's allergies indicates:   Allergen Reactions    Gluten protein     Soy        Past Medical History:   Diagnosis Date    Anxiety     Depression     H/O LEEP      Past Surgical History:   Procedure Laterality Date    TONSILLECTOMY       History reviewed. No pertinent family history.  Social History     Tobacco Use    Smoking status: Current Every Day Smoker     Packs/day: 0.50   Substance Use Topics    Alcohol use: Yes     Frequency: Never     Comment: social    Drug use: Never        Review of Systems:  ROS not obtained    OBJECTIVE:     Vital Signs (Most Recent):  Temp: 98.2 °F (36.8 °C) (06/12/20 0753)  Pulse: (MRI) (06/12/20 1000)  Resp: 16 (06/12/20 0753)  BP: 113/67 (06/12/20 0753)  SpO2: 97 % (06/12/20 0753)    Physical Exam:  alert and oriented    Laboratory  CBC:   Lab Results   Component Value Date/Time    WBC 10.63 06/12/2020 05:27 AM    RBC 4.54 06/12/2020 05:27 AM    HGB 13.7 06/12/2020 05:27 AM    HCT 41.5 06/12/2020 05:27 AM     06/12/2020 05:27 AM    MCV 91 06/12/2020 05:27 AM    MCH 30.2 06/12/2020 05:27 AM    MCHC 33.0 06/12/2020 05:27 AM         ASSESSMENT/PLAN:     Paresthesias.    Patient will undergo fluoro guided LP.    Sedation Plan: Lidocaine local anesthesia

## 2020-06-12 NOTE — CONSULTS
Interventional Radiology    Consulted for Fluoro guided LP. Procedure performed in usual manner with no immediate post-procedure complications noted. Sample sent for requested studies. Full report to follow.    Kulwant Fitzpatrick MD  [phone]

## 2020-06-12 NOTE — PROGRESS NOTES
"[facility] Hospital Medicine  Progress Note    Patient Name: Cari Barillas  MRN: [id number]  Patient Class: IP- Inpatient   Admission Date: 6/10/2020  Length of Stay: 1 days  Attending Physician: CHERELLE Eid MD  Primary Care Provider: Primary Doctor No        Subjective:     Principal Problem:Brain lesion        HPI:  Ms. Cari Barillas is a 34 y.o. female, with PMH of anxiety, who presented to [facility] ED on 6/10/20 with left upper and lower extremity paresthesias. She states the LLE sensation loss started on Monday with a "pins and needles" sensation, and has persisted since that time. Upon awakening today, she noted "pins and needles" of the LUE. Associated symptoms include a feeling of "heaviness" of both extremities. She denies trauma or injury to either extremity, vision changes, headache, fever, chills, neck/back pain, SOB, decreased  strength, change in gait. She was evaluated in the ED with a CT of the Head that showed no acute intracranial abnormalities. Neuro was consulted and recommended MRA/MRI in the AM. She is placed on OBS.     Overview/Hospital Course:  No notes on file    Interval History: no overnight events    Review of Systems   Constitutional: Negative for diaphoresis and fever.   Respiratory: Negative for cough and shortness of breath.    Cardiovascular: Negative for chest pain, palpitations and leg swelling.   Gastrointestinal: Negative for abdominal pain, constipation, diarrhea, nausea and vomiting.   Musculoskeletal: Negative for back pain, gait problem, myalgias, neck pain and neck stiffness.   Skin: Negative.  Negative for wound.   Neurological: Positive for weakness and numbness. Negative for dizziness, syncope, light-headedness and headaches.     Objective:     Vital Signs (Most Recent):  Temp: 99.2 °F (37.3 °C) (06/12/20 1618)  Pulse: 84 (06/12/20 1618)  Resp: 18 (06/12/20 1618)  BP: 115/67 (06/12/20 1618)  SpO2: 96 % (06/12/20 1618) Vital Signs (24h " Range):  Temp:  [97.9 °F (36.6 °C)-99.2 °F (37.3 °C)] 99.2 °F (37.3 °C)  Pulse:  [65-86] 84  Resp:  [15-20] 18  SpO2:  [95 %-98 %] 96 %  BP: ()/(56-84) 115/67     Weight: 86.5 kg (190 lb 11.2 oz)  Body mass index is 32.73 kg/m².    Intake/Output Summary (Last 24 hours) at 6/12/2020 1729  Last data filed at 6/12/2020 0100  Gross per 24 hour   Intake 360 ml   Output 1350 ml   Net -990 ml      Physical Exam   Constitutional: She is oriented to person, place, and time. She appears well-developed and well-nourished. No distress.   HENT:   Head: Normocephalic and atraumatic.   Eyes: Conjunctivae are normal. No scleral icterus.   Neck: Normal range of motion. Neck supple. No tracheal deviation present.   Cardiovascular: Normal rate, regular rhythm, normal heart sounds and intact distal pulses.   Pulmonary/Chest: Effort normal and breath sounds normal.   Abdominal: Soft. Bowel sounds are normal. She exhibits no distension. There is no tenderness. There is no guarding.   Neurological: She is alert and oriented to person, place, and time. A sensory deficit (reported in left forarm/hand and left leg below the knee) is present. No cranial nerve deficit. Coordination normal.   Skin: Skin is warm and dry. She is not diaphoretic. No pallor.   Nursing note and vitals reviewed.      Significant Labs:   CBC:   Recent Labs   Lab 06/10/20  1737 06/11/20  0554 06/12/20  0527   WBC 13.37* 13.11* 10.63   HGB 15.0 13.3 13.7   HCT 45.6 41.0 41.5    276 259     CMP:   Recent Labs   Lab 06/10/20  1737 06/11/20  0554 06/12/20  0527    138 138   K 4.0 3.9 4.2    108 107   CO2 19* 20* 21*   * 84 95   BUN 8 10 9   CREATININE 0.8 0.7 0.7   CALCIUM 9.7 8.7 9.3   PROT 7.4 6.3 6.7   ALBUMIN 3.8 3.3* 3.5   BILITOT 0.4 0.4 0.3   ALKPHOS 95 79 85   AST 23 18 17   ALT 30 25 23   ANIONGAP 13 10 10   EGFRNONAA >60 >60 >60     All pertinent labs within the past 24 hours have been reviewed.    Significant Imaging: I have  reviewed all pertinent imaging results/findings within the past 24 hours.   Imaging Results          CT Head Without Contrast (Final result)  Result time 06/10/20 18:56:00    Final result by Lianet Avila MD (06/10/20 18:56:00)                 Impression:      No acute intracranial abnormality detected. If persistent neurological symptoms, recommend MRI of the brain with diffusion-weighted sequencing.      Electronically signed by: Lianet Avila  Date:    06/10/2020  Time:    18:56             Narrative:    EXAMINATION:  CT OF THE HEAD WITHOUT    CLINICAL HISTORY:  Focal neuro deficit, new, fixed or worsening, >6 hours;    TECHNIQUE:  5 mm unenhanced axial images were obtained from the skull base to the vertex.    COMPARISON:  None.    FINDINGS:  The ventricles, basal cisterns, and cortical sulci are within normal limits for patient's stated age. There is no acute intracranial hemorrhage, territorial infarct or mass effect, or midline shift. The visualized paranasal sinuses and mastoid air cells are clear.                                    Assessment/Plan:      * Multiple enhancing brain lesions  - she has persistant paresthesias x Monday of the LLE, and x 1 day of the LUE   - concern for MS vs other  - CT head negative for acute findings  - MRA/MRIs 2 solidly enhancing lesions in the supratentorial white matter; Two additional subcentimeter nonenhancing lesions elsewhere.  Although not entirely specific, findings concerning for demyelinating disease (such as multiple sclerosis) with areas of active demyelination.  Lymphoma or other process not entirely excluded  - Neuro following  - s/p  LP CSF studies ordered; Lumbar puncture, send CSF for cell count/protein/glucose, cytology, bacterial culture, fungal culture, HSV PCR, EBV PCR, ACE, CSF IgG index, oligoclonal bands  -  vitamin B1, B6 pending,  Ace  pending, LIZA  neg, B12 379, Vit D low, supplement  HIV neg, folic acid 5.8, RPR  NR, PETH level pending  -  start 1 g IV daily after the lumbar puncture is done  - CT C/A/P with contrast   - d/w Neuro      Chronic alcohol use  - check B12, thiamine  - reports drinking occasionally during the week      Dehydration  - s/p IVF's, improved      VTE Risk Mitigation (From admission, onward)         Ordered     IP VTE HIGH RISK PATIENT  Once      06/10/20 2315     Place sequential compression device  Until discontinued      06/10/20 2315                      Janny Covington PA-C  Department of Hospital Medicine   [facility]

## 2020-06-12 NOTE — ASSESSMENT & PLAN NOTE
- she has persistant paresthesias x Monday of the LLE, and x 1 day of the LUE   - concern for MS vs other  - CT head negative for acute findings  - MRA/MRIs 2 solidly enhancing lesions in the supratentorial white matter; Two additional subcentimeter nonenhancing lesions elsewhere.  Although not entirely specific, findings concerning for demyelinating disease (such as multiple sclerosis) with areas of active demyelination.  Lymphoma or other process not entirely excluded  - Neuro following  - s/p  LP CSF studies ordered; Lumbar puncture, send CSF for cell count/protein/glucose, cytology, bacterial culture, fungal culture, HSV PCR, EBV PCR, ACE, CSF IgG index, oligoclonal bands  -  vitamin B1, B6 pending,  Ace pending, LIZA neg, B12 379, Vit D low, supplement  HIV neg, folic acid 5.8, RPR NR, PETH level pending  - start 1 g IV daily after the lumbar puncture is done  - CT C/A/P with contrast   - d/w Neuro

## 2020-06-12 NOTE — PROGRESS NOTES
Pt arrived back to room 318. AAOx4 and VS stable on room air. Pt has no complaints of pain, nausea or SOB. Procedural site to lower back has bandaid dressing which is clean, dry and intact. Neuro check performed and pt presents with tingling to left foot, but no other areas of numbness or tingling at this time. Cardiac monitor applied. Bed low and locked with side rails up x 2 and call light within reach. Will continue to monitor.

## 2020-06-13 PROBLEM — E86.0 DEHYDRATION: Status: RESOLVED | Noted: 2020-06-10 | Resolved: 2020-06-13

## 2020-06-13 LAB — ACE SERPL-CCNC: 30 U/L (ref 16–85)

## 2020-06-13 PROCEDURE — 25500020 PHARM REV CODE 255: Performed by: INTERNAL MEDICINE

## 2020-06-13 PROCEDURE — 25000003 PHARM REV CODE 250: Performed by: PHYSICIAN ASSISTANT

## 2020-06-13 PROCEDURE — 99233 SBSQ HOSP IP/OBS HIGH 50: CPT | Mod: ,,, | Performed by: PHYSICIAN ASSISTANT

## 2020-06-13 PROCEDURE — S4991 NICOTINE PATCH NONLEGEND: HCPCS | Performed by: PHYSICIAN ASSISTANT

## 2020-06-13 PROCEDURE — 63600175 PHARM REV CODE 636 W HCPCS: Performed by: PHYSICIAN ASSISTANT

## 2020-06-13 PROCEDURE — 99233 PR SUBSEQUENT HOSPITAL CARE,LEVL III: ICD-10-PCS | Mod: ,,, | Performed by: PHYSICIAN ASSISTANT

## 2020-06-13 PROCEDURE — 25000003 PHARM REV CODE 250: Performed by: NURSE PRACTITIONER

## 2020-06-13 PROCEDURE — 11000001 HC ACUTE MED/SURG PRIVATE ROOM

## 2020-06-13 RX ADMIN — LORAZEPAM 0.5 MG: 0.5 TABLET ORAL at 09:06

## 2020-06-13 RX ADMIN — ERGOCALCIFEROL 50000 UNITS: 1.25 CAPSULE ORAL at 09:06

## 2020-06-13 RX ADMIN — LORAZEPAM 0.5 MG: 0.5 TABLET ORAL at 10:06

## 2020-06-13 RX ADMIN — ACETAMINOPHEN 650 MG: 325 TABLET ORAL at 11:06

## 2020-06-13 RX ADMIN — ACETAMINOPHEN 650 MG: 325 TABLET ORAL at 10:06

## 2020-06-13 RX ADMIN — DEXTROSE: 50 INJECTION, SOLUTION INTRAVENOUS at 09:06

## 2020-06-13 RX ADMIN — NICOTINE 1 PATCH: 21 PATCH, EXTENDED RELEASE TRANSDERMAL at 09:06

## 2020-06-13 RX ADMIN — IOHEXOL 100 ML: 350 INJECTION, SOLUTION INTRAVENOUS at 02:06

## 2020-06-13 RX ADMIN — CITALOPRAM HYDROBROMIDE 10 MG: 10 TABLET ORAL at 09:06

## 2020-06-13 NOTE — CONSULTS
Spoke with patient about MRI brain, CSF results. She has lesions in the juxtacortical region as well as two enhancing lesions one off which( corpus callosum) is in the periventricular region when viewed on sagittal sections.    Given evidence of dissemination in time and space I believe she meets criteria for MS. CSF WBC is less than 50 with normal protein and glucose. I recommended that she confirm the diagnosis with Dr. Oro to talk about further management options.  She is completing 3 days of iv solumedrol .    Additionally she states she had tingling involving the right upper extremity one year ago which lasted one month. This may qualify for dissemination in time in addition to the enhancing and non-enhancing lesions she has. Some CSF results are currently pending( CSF cytology)and I recommended that she follow up the results with Dr. Oro when she sees her.  CT chest, abdomen and pelvis was ordered given concern for lymphoma per [facility] reading. This is negative for lymphoma/ underlying malignancy.            Wandy Avalos MD  Medicine-Neurology, Clinical Neurophysiology

## 2020-06-13 NOTE — SUBJECTIVE & OBJECTIVE
Interval History: less numbness    Review of Systems   Constitutional: Negative for diaphoresis and fever.   Respiratory: Negative for cough and shortness of breath.    Cardiovascular: Negative for chest pain, palpitations and leg swelling.   Gastrointestinal: Negative for abdominal pain, constipation, diarrhea, nausea and vomiting.   Musculoskeletal: Negative for back pain, gait problem, myalgias, neck pain and neck stiffness.   Skin: Negative.  Negative for wound.   Neurological: Positive for weakness and numbness. Negative for dizziness, syncope, light-headedness and headaches.     Objective:     Vital Signs (Most Recent):  Temp: 98.1 °F (36.7 °C) (06/13/20 1246)  Pulse: 86 (06/13/20 1246)  Resp: 16 (06/13/20 1246)  BP: 118/71 (06/13/20 1246)  SpO2: (!) 94 % (06/13/20 1246) Vital Signs (24h Range):  Temp:  [97.9 °F (36.6 °C)-99.2 °F (37.3 °C)] 98.1 °F (36.7 °C)  Pulse:  [80-98] 86  Resp:  [16-20] 16  SpO2:  [94 %-97 %] 94 %  BP: (100-136)/(56-77) 118/71     Weight: 88.9 kg (195 lb 15.8 oz)  Body mass index is 33.64 kg/m².    Intake/Output Summary (Last 24 hours) at 6/13/2020 1408  Last data filed at 6/12/2020 1954  Gross per 24 hour   Intake 120 ml   Output 550 ml   Net -430 ml      Physical Exam  Vitals signs and nursing note reviewed.   Constitutional:       General: She is not in acute distress.     Appearance: She is well-developed and well-nourished. She is not diaphoretic.   HENT:      Head: Normocephalic and atraumatic.   Eyes:      General: No scleral icterus.     Conjunctiva/sclera: Conjunctivae normal.   Neck:      Musculoskeletal: Normal range of motion and neck supple.      Trachea: No tracheal deviation.   Cardiovascular:      Rate and Rhythm: Normal rate and regular rhythm.      Pulses: Intact distal pulses.      Heart sounds: Normal heart sounds.   Pulmonary:      Effort: Pulmonary effort is normal.      Breath sounds: Normal breath sounds.   Abdominal:      General: Bowel sounds are normal. There  is no distension.      Palpations: Abdomen is soft.      Tenderness: There is no abdominal tenderness. There is no guarding.   Skin:     General: Skin is warm and dry.      Coloration: Skin is not pale.   Neurological:      Mental Status: She is alert and oriented to person, place, and time.      Cranial Nerves: No cranial nerve deficit.      Coordination: Coordination normal.         Significant Labs:   CBC:   Recent Labs   Lab 06/12/20  0527   WBC 10.63   HGB 13.7   HCT 41.5        CMP:   Recent Labs   Lab 06/12/20  0527      K 4.2      CO2 21*   GLU 95   BUN 9   CREATININE 0.7   CALCIUM 9.3   PROT 6.7   ALBUMIN 3.5   BILITOT 0.3   ALKPHOS 85   AST 17   ALT 23   ANIONGAP 10   EGFRNONAA >60     All pertinent labs within the past 24 hours have been reviewed.    Significant Imaging: I have reviewed all pertinent imaging results/findings within the past 24 hours.   Imaging Results          CT Head Without Contrast (Final result)  Result time 06/10/20 18:56:00    Final result by Lianet Avila MD (06/10/20 18:56:00)                 Impression:      No acute intracranial abnormality detected. If persistent neurological symptoms, recommend MRI of the brain with diffusion-weighted sequencing.      Electronically signed by: Lianet Avila  Date:    06/10/2020  Time:    18:56             Narrative:    EXAMINATION:  CT OF THE HEAD WITHOUT    CLINICAL HISTORY:  Focal neuro deficit, new, fixed or worsening, >6 hours;    TECHNIQUE:  5 mm unenhanced axial images were obtained from the skull base to the vertex.    COMPARISON:  None.    FINDINGS:  The ventricles, basal cisterns, and cortical sulci are within normal limits for patient's stated age. There is no acute intracranial hemorrhage, territorial infarct or mass effect, or midline shift. The visualized paranasal sinuses and mastoid air cells are clear.

## 2020-06-13 NOTE — ASSESSMENT & PLAN NOTE
- concern for MS vs other  - CT head negative for acute findings  - MRA/MRIs 2 solidly enhancing lesions in the supratentorial white matter; Two additional subcentimeter nonenhancing lesions elsewhere.  Although not entirely specific, findings concerning for demyelinating disease (such as multiple sclerosis) with areas of active demyelination.  Lymphoma or other process not entirely excluded  - MRI cervical/thoracic no cord lesions identified  - Neuro following  - s/p  LP CSF studies ordered; Lumbar puncture, send CSF for cell count/protein/glucose, cytology, bacterial culture, fungal culture, HSV PCR, EBV PCR, ACE, CSF IgG index, oligoclonal bands  -  vitamin B1, B6 pending,  Ace pending, LIZA neg, B12 379, Vit D low, supplement  HIV neg, folic acid 5.8, RPR NR, PETH level pending  - on 1 g IV solumedrol for 3 days, today 2/3  - CT C/A/P with contrast pending  - d/w Neuro

## 2020-06-13 NOTE — PLAN OF CARE
POC reviewed with patient. AAOx4 and VS stable on room air. Pt reported mild headache earlier in shift which was treated with prn pain medication appropriately as ordered; no further complaints of pain throughout shift. Neuro checks completed Q4 as ordered; pt presents with no numbness, but with tingling to the LLE knee region and to the bottom of the left foot when walking. No difficulty voiding; up to toilet. Pt remains on telemetry monitor. Positions self and ambulates independently. No injuries, falls, or trauma occurred during shift. Purposeful rounding completed. Bed low and locked with side rails up x 2 and call light within reach. Will continue to monitor.

## 2020-06-13 NOTE — HOSPITAL COURSE
34 y.o. female admitted with worsening numbness over the left upper and lower extremities, suspected MS flare (new diagnosis). MRI brain revealed 2 enhancing lesions, 1 measuring 1.2 cm 2 along the splenium of the corpus callosum on the left, 0.7 cm enhancing focus in the juxtacortical white matter of the right parietal lobe. Two nonenhancing lesions are noted in the left posterior temporal lobe and right parietal juxtacortical white matter. Neurology consulted. Multiple enhancing brain lesions- concern for clinically isolated syndrome/multiple sclerosis vs metastatic lesions. S/p LP, MRI cervical and thoracic spine essentially unremarkable, No cord lesions identified. No acute pathology noted within the chest, abdomen or pelvis.  No lymphadenopathy identified. S/p 3 days of 1 g solumedrol. Patient is stable for discharge, will follow with Neurology.

## 2020-06-13 NOTE — PROGRESS NOTES
"[facility] Hospital Medicine  Progress Note    Patient Name: Cari Barillas  MRN: [id number]  Patient Class: IP- Inpatient   Admission Date: 6/10/2020  Length of Stay: 2 days  Attending Physician: CHERELLE Eid MD  Primary Care Provider: Primary Doctor No        Subjective:     Principal Problem:Brain lesion        HPI:  Ms. Cari Barillas is a 34 y.o. female, with PMH of anxiety, who presented to [facility] ED on 6/10/20 with left upper and lower extremity paresthesias. She states the LLE sensation loss started on Monday with a "pins and needles" sensation, and has persisted since that time. Upon awakening today, she noted "pins and needles" of the LUE. Associated symptoms include a feeling of "heaviness" of both extremities. She denies trauma or injury to either extremity, vision changes, headache, fever, chills, neck/back pain, SOB, decreased  strength, change in gait. She was evaluated in the ED with a CT of the Head that showed no acute intracranial abnormalities. Neuro was consulted and recommended MRA/MRI in the AM. She is placed on OBS.     Overview/Hospital Course:  34 y.o. female admitted with worsening numbness over the left upper and lower extremities. MRI brain revealed 2 enhancing lesions, 1 measuring 1.2 cm2 along the splenium of the corpus callosum on the left, 0.7 cm enhancing focus in the juxtacortical white matter of the right parietal lobe. Two nonenhancing lesions are noted in the left posterior temporal lobe and right parietal juxtacortical white matter. Neurology consulted. Multiple enhancing brain lesions- concern for clinically isolated syndrome/multiple sclerosis vs metastatic lesions. S/p LP, MRI cervical and thoracic spine   Essentially unremarkable MRI of the Cervical and thoracic spine, No cord lesions identified.     Interval History: less numbness    Review of Systems   Constitutional: Negative for diaphoresis and fever.   Respiratory: Negative for cough and " shortness of breath.    Cardiovascular: Negative for chest pain, palpitations and leg swelling.   Gastrointestinal: Negative for abdominal pain, constipation, diarrhea, nausea and vomiting.   Musculoskeletal: Negative for back pain, gait problem, myalgias, neck pain and neck stiffness.   Skin: Negative.  Negative for wound.   Neurological: Positive for weakness and numbness. Negative for dizziness, syncope, light-headedness and headaches.     Objective:     Vital Signs (Most Recent):  Temp: 98.1 °F (36.7 °C) (06/13/20 1246)  Pulse: 86 (06/13/20 1246)  Resp: 16 (06/13/20 1246)  BP: 118/71 (06/13/20 1246)  SpO2: (!) 94 % (06/13/20 1246) Vital Signs (24h Range):  Temp:  [97.9 °F (36.6 °C)-99.2 °F (37.3 °C)] 98.1 °F (36.7 °C)  Pulse:  [80-98] 86  Resp:  [16-20] 16  SpO2:  [94 %-97 %] 94 %  BP: (100-136)/(56-77) 118/71     Weight: 88.9 kg (195 lb 15.8 oz)  Body mass index is 33.64 kg/m².    Intake/Output Summary (Last 24 hours) at 6/13/2020 1408  Last data filed at 6/12/2020 1954  Gross per 24 hour   Intake 120 ml   Output 550 ml   Net -430 ml      Physical Exam  Vitals signs and nursing note reviewed.   Constitutional:       General: She is not in acute distress.     Appearance: She is well-developed and well-nourished. She is not diaphoretic.   HENT:      Head: Normocephalic and atraumatic.   Eyes:      General: No scleral icterus.     Conjunctiva/sclera: Conjunctivae normal.   Neck:      Musculoskeletal: Normal range of motion and neck supple.      Trachea: No tracheal deviation.   Cardiovascular:      Rate and Rhythm: Normal rate and regular rhythm.      Pulses: Intact distal pulses.      Heart sounds: Normal heart sounds.   Pulmonary:      Effort: Pulmonary effort is normal.      Breath sounds: Normal breath sounds.   Abdominal:      General: Bowel sounds are normal. There is no distension.      Palpations: Abdomen is soft.      Tenderness: There is no abdominal tenderness. There is no guarding.   Skin:     General:  Skin is warm and dry.      Coloration: Skin is not pale.   Neurological:      Mental Status: She is alert and oriented to person, place, and time.      Cranial Nerves: No cranial nerve deficit.      Coordination: Coordination normal.         Significant Labs:   CBC:   Recent Labs   Lab 06/12/20  0527   WBC 10.63   HGB 13.7   HCT 41.5        CMP:   Recent Labs   Lab 06/12/20  0527      K 4.2      CO2 21*   GLU 95   BUN 9   CREATININE 0.7   CALCIUM 9.3   PROT 6.7   ALBUMIN 3.5   BILITOT 0.3   ALKPHOS 85   AST 17   ALT 23   ANIONGAP 10   EGFRNONAA >60     All pertinent labs within the past 24 hours have been reviewed.    Significant Imaging: I have reviewed all pertinent imaging results/findings within the past 24 hours.   Imaging Results          CT Head Without Contrast (Final result)  Result time 06/10/20 18:56:00    Final result by Lianet Avila MD (06/10/20 18:56:00)                 Impression:      No acute intracranial abnormality detected. If persistent neurological symptoms, recommend MRI of the brain with diffusion-weighted sequencing.      Electronically signed by: Lianet Avila  Date:    06/10/2020  Time:    18:56             Narrative:    EXAMINATION:  CT OF THE HEAD WITHOUT    CLINICAL HISTORY:  Focal neuro deficit, new, fixed or worsening, >6 hours;    TECHNIQUE:  5 mm unenhanced axial images were obtained from the skull base to the vertex.    COMPARISON:  None.    FINDINGS:  The ventricles, basal cisterns, and cortical sulci are within normal limits for patient's stated age. There is no acute intracranial hemorrhage, territorial infarct or mass effect, or midline shift. The visualized paranasal sinuses and mastoid air cells are clear.                                    Assessment/Plan:      * Multiple enhancing brain lesions  - concern for MS vs other  - CT head negative for acute findings  - MRA/MRIs 2 solidly enhancing lesions in the supratentorial white matter; Two  additional subcentimeter nonenhancing lesions elsewhere.  Although not entirely specific, findings concerning for demyelinating disease (such as multiple sclerosis) with areas of active demyelination.  Lymphoma or other process not entirely excluded  - MRI cervical/thoracic no cord lesions identified  - Neuro following  - s/p  LP CSF studies ordered; Lumbar puncture, send CSF for cell count/protein/glucose, cytology, bacterial culture, fungal culture, HSV PCR, EBV PCR, ACE, CSF IgG index, oligoclonal bands  -  vitamin B1, B6 pending,  Ace pending, LIZA neg, B12 379, Vit D low, supplement  HIV neg, folic acid 5.8, RPR NR, PETH level pending  - on 1 g IV solumedrol for 3 days, today 2/3  - CT C/A/P with contrast pending  - d/w Neuro      Chronic alcohol use  - B12 within normal limits,  Thiamine pending  - discussed alcohol cessation, reduction      VTE Risk Mitigation (From admission, onward)         Ordered     IP VTE HIGH RISK PATIENT  Once      06/10/20 9550     Place sequential compression device  Until discontinued      06/10/20 3220                      Janny Covington PA-C  Department of Hospital Medicine   [facility]   No pertinent family history in first degree relatives

## 2020-06-13 NOTE — PLAN OF CARE
Patient took a shower last night before ordering a gluten free pizza and soda from [employer]. Right forearm 22 gauge IV access intact. Sinus rhythm on telemetry. Call light within reach. Encouraged patient to call for any and all needs. Patient verbalized understanding of instructions. Will continue to monitor patient.

## 2020-06-14 VITALS
RESPIRATION RATE: 16 BRPM | HEART RATE: 68 BPM | OXYGEN SATURATION: 95 % | BODY MASS INDEX: 34.13 KG/M2 | TEMPERATURE: 98 F | DIASTOLIC BLOOD PRESSURE: 73 MMHG | SYSTOLIC BLOOD PRESSURE: 112 MMHG | WEIGHT: 199.94 LBS | HEIGHT: 64 IN

## 2020-06-14 PROCEDURE — 94761 N-INVAS EAR/PLS OXIMETRY MLT: CPT

## 2020-06-14 PROCEDURE — 25000003 PHARM REV CODE 250: Performed by: NURSE PRACTITIONER

## 2020-06-14 PROCEDURE — 99239 HOSP IP/OBS DSCHRG MGMT >30: CPT | Mod: ,,, | Performed by: PHYSICIAN ASSISTANT

## 2020-06-14 PROCEDURE — 25000003 PHARM REV CODE 250: Performed by: PHYSICIAN ASSISTANT

## 2020-06-14 PROCEDURE — 63600175 PHARM REV CODE 636 W HCPCS: Performed by: PHYSICIAN ASSISTANT

## 2020-06-14 PROCEDURE — 99239 PR HOSPITAL DISCHARGE DAY,>30 MIN: ICD-10-PCS | Mod: ,,, | Performed by: PHYSICIAN ASSISTANT

## 2020-06-14 PROCEDURE — S4991 NICOTINE PATCH NONLEGEND: HCPCS | Performed by: PHYSICIAN ASSISTANT

## 2020-06-14 RX ORDER — LANOLIN ALCOHOL/MO/W.PET/CERES
1000 CREAM (GRAM) TOPICAL DAILY
COMMUNITY
Start: 2020-06-14 | End: 2022-06-27

## 2020-06-14 RX ORDER — ERGOCALCIFEROL 1.25 MG/1
50000 CAPSULE ORAL
Qty: 4 CAPSULE | Refills: 0 | Status: SHIPPED | OUTPATIENT
Start: 2020-06-20 | End: 2020-09-22

## 2020-06-14 RX ORDER — LORAZEPAM 0.5 MG/1
0.5 TABLET ORAL EVERY 8 HOURS PRN
Qty: 10 TABLET | Refills: 0 | Status: SHIPPED | OUTPATIENT
Start: 2020-06-14 | End: 2020-08-07 | Stop reason: SDUPTHER

## 2020-06-14 RX ORDER — IBUPROFEN 200 MG
1 TABLET ORAL DAILY
Qty: 30 PATCH | Refills: 0 | COMMUNITY
Start: 2020-06-15 | End: 2020-07-02 | Stop reason: SDUPTHER

## 2020-06-14 RX ADMIN — CITALOPRAM HYDROBROMIDE 10 MG: 10 TABLET ORAL at 08:06

## 2020-06-14 RX ADMIN — DEXTROSE: 50 INJECTION, SOLUTION INTRAVENOUS at 08:06

## 2020-06-14 RX ADMIN — NICOTINE 1 PATCH: 21 PATCH, EXTENDED RELEASE TRANSDERMAL at 08:06

## 2020-06-14 RX ADMIN — ACETAMINOPHEN 650 MG: 325 TABLET ORAL at 08:06

## 2020-06-14 NOTE — PLAN OF CARE
Problem: Adult Inpatient Plan of Care  Goal: Plan of Care Review  6/14/2020 0300 by Anaya Weldno RN  Outcome: Ongoing, Progressing  Flowsheets (Taken 6/14/2020 0300)  Plan of Care Reviewed With: patient     Problem: Adult Inpatient Plan of Care  Goal: Optimal Comfort and Wellbeing  Intervention: Provide Person-Centered Care  Flowsheets (Taken 6/14/2020 0300)  Trust Relationship/Rapport:   care explained   choices provided   emotional support provided   empathic listening provided   questions answered   thoughts/feelings acknowledged   questions encouraged   reassurance provided     Problem: Fall Injury Risk  Goal: Absence of Fall and Fall-Related Injury  Intervention: Identify and Manage Contributors to Fall Injury Risk  Flowsheets (Taken 6/14/2020 0300)  Medication Review/Management: medications reviewed     Problem: Fall Injury Risk  Goal: Absence of Fall and Fall-Related Injury  Intervention: Promote Injury-Free Environment  Flowsheets (Taken 6/14/2020 0300)  Safety Promotion/Fall Prevention:   assistive device/personal item within reach   diversional activities provided   lighting adjusted   medications reviewed   nonskid shoes/socks when out of bed   side rails raised x 2

## 2020-06-14 NOTE — PLAN OF CARE
Patient cleared for discharge from case management standpoint.       06/14/20 1046   Final Note   Assessment Type Final Discharge Note   Anticipated Discharge Disposition Home   What phone number can be called within the next 1-3 days to see how you are doing after discharge? [phone]   Hospital Follow Up  Appt(s) scheduled? No  (patient to schedule)   Discharge plans and expectations educations in teach back method with documentation complete? Yes   Right Care Referral Info   Post Acute Recommendation No Care      Writer called MD Xiomy Chinchilla to confirm 2100 peritoneal dialysis and how long pt should dwell. MD stated that he can be drained around midnight and left dry until the next 0600 dialysate.

## 2020-06-14 NOTE — DISCHARGE SUMMARY
"[facility] Hospital Medicine  Discharge Summary      Patient Name: Cari Barillas  MRN: [id number]  Admission Date: 6/10/2020  Hospital Length of Stay: 3 days  Discharge Date and Time: 6/14/2020 12:32 PM  Attending Physician: No att. providers found   Discharging Provider: Janny Covington PA-C  Primary Care Provider: Primary Doctor Laureen      HPI:   Ms. Cari Barillas is a 34 y.o. female, with PMH of anxiety, who [place] [facility] [place] with left upper and lower extremity paresthesias. She states the LLE sensation loss started on Monday with a "pins and needles" sensation, and has persisted since that time. Upon awakening today, she noted "pins and needles" of the LUE. Associated symptoms include a feeling of "heaviness" of both extremities. She denies trauma or injury to either extremity, vision changes, headache, fever, chills, neck/back pain, SOB, decreased  strength, change in gait. She was evaluated in the ED with a CT of the Head that showed no acute intracranial abnormalities. Neuro was consulted and recommended MRA/MRI in the AM. She is placed on OBS.     Procedure(s) (LRB):  LUMBAR PUNCTURE (N/A)      Hospital Course:   34 y.o. female admitted with worsening numbness over the left upper and lower extremities, suspected MS flare (new diagnosis). MRI brain revealed 2 enhancing lesions, 1 measuring 1.2 cm 2 along the splenium of the corpus callosum on the left, 0.7 cm enhancing focus in the juxtacortical white matter of the right parietal lobe. Two nonenhancing lesions are noted in the left posterior temporal lobe and right parietal juxtacortical white matter. Neurology consulted. Multiple enhancing brain lesions- concern for clinically isolated syndrome/multiple sclerosis vs metastatic lesions. S/p LP, MRI cervical and thoracic spine essentially unremarkable, No cord lesions identified. No acute pathology noted within the chest, abdomen or pelvis.  No lymphadenopathy identified. " S/p 3 days of 1 g solumedrol. Patient is stable for discharge, will follow with Neurology.      Consults:   Consults (From admission, onward)        Status Ordering Provider     Inpatient consult to Interventional Radiology  Once     Provider:  Kulwant Fitzpatrick MD    Completed NEERAJ HERNANDEZ     Inpatient consult to Neurology  Once     Provider:  Wandy Avalos MD    Completed NEERAJ HERNANDEZ     Inpatient consult to Registered Dietitian/Nutritionist  Once     Provider:  (Not yet assigned)    Completed KIRA BARILLAS     IP consult to case management/social work  Once     Provider:  (Not yet assigned)    Completed KIRA BARILLAS          No new Assessment & Plan notes have been filed under this hospital service since the last note was generated.  Service: Hospital Medicine    Final Active Diagnoses:    Diagnosis Date Noted POA    PRINCIPAL PROBLEM:  Multiple enhancing brain lesions [G93.9] 06/10/2020 Yes    Paresthesia of left upper extremity [R20.2] 06/11/2020 Yes    Chronic alcohol use [Z72.89] 06/11/2020 Yes      Problems Resolved During this Admission:    Diagnosis Date Noted Date Resolved POA    Hyperglycemia [R73.9] 06/10/2020 06/12/2020 Yes    Dehydration [E86.0] 06/10/2020 06/13/2020 Yes       Discharged Condition: stable    Disposition: Home or Self Care    Follow Up:  Follow-up Information     Aida Oro MD.    Specialty: Neurology  Why: post hospital follow-up  Contact information:  [address] [facility] [address]  [phone]                 Patient Instructions:      Ambulatory referral/consult to Neurology   Standing Status: Future   Referral Priority: Routine Referral Type: Consultation   Referral Reason: Specialty Services Required   Referred to Provider: AIDA ORO Requested Specialty: Neurology   Number of Visits Requested: 1     Ambulatory referral/consult to Internal Medicine   Standing Status: Future   Referral Priority: Routine Referral Type:  Consultation   Referral Reason: Specialty Services Required   Requested Specialty: Internal Medicine   Number of Visits Requested: 1     Diet Adult Regular     Notify your health care provider if you experience any of the following:  temperature >100.4     Notify your health care provider if you experience any of the following:  persistent nausea and vomiting or diarrhea     Notify your health care provider if you experience any of the following:  severe uncontrolled pain     Notify your health care provider if you experience any of the following:  difficulty breathing or increased cough     Notify your health care provider if you experience any of the following:  severe persistent headache     Notify your health care provider if you experience any of the following:  persistent dizziness, light-headedness, or visual disturbances     Notify your health care provider if you experience any of the following:  increased confusion or weakness     Activity as tolerated       Significant Diagnostic Studies:    Radiology:   Imaging Results          CT Head Without Contrast (Final result)  Result time 06/10/20 18:56:00    Final result by Lianet Avila MD (06/10/20 18:56:00)                 Impression:      No acute intracranial abnormality detected. If persistent neurological symptoms, recommend MRI of the brain with diffusion-weighted sequencing.      Electronically signed by: Lianet Avila  Date:    06/10/2020  Time:    18:56             Narrative:    EXAMINATION:  CT OF THE HEAD WITHOUT    CLINICAL HISTORY:  Focal neuro deficit, new, fixed or worsening, >6 hours;    TECHNIQUE:  5 mm unenhanced axial images were obtained from the skull base to the vertex.    COMPARISON:  None.    FINDINGS:  The ventricles, basal cisterns, and cortical sulci are within normal limits for patient's stated age. There is no acute intracranial hemorrhage, territorial infarct or mass effect, or midline shift. The visualized paranasal  sinuses and mastoid air cells are clear.                                  Pending Diagnostic Studies:     Procedure Component Value Units Date/Time    ACE, CSF [[id number]] Collected: 06/12/20 1221    Order Status: Sent Lab Status: In process Updated: 06/12/20 1717    Specimen: CSF (Spinal Fluid) from Cerebrospinal Fluid     Cytology, Fluid/Wash/Brush [[id number]] Collected: 06/12/20 1221    Order Status: Sent Lab Status: In process Updated: 06/12/20 1316    Freeze and Hold,  [[id number]] Collected: 06/12/20 1221    Order Status: Sent Lab Status: No result     Specimen: CSF (Spinal Fluid) from Cerebrospinal Fluid     Herpes Simplex (HSV) by PCR, CSF [[id number]] Collected: 06/12/20 1221    Order Status: Sent Lab Status: In process Updated: 06/12/20 2048    Specimen: CSF (Spinal Fluid) from Cerebrospinal Fluid     Ms Profile [[id number]] Collected: 06/11/20 1259    Order Status: Sent Lab Status: In process Updated: 06/11/20 1917    Specimen: CSF (Spinal Fluid) from Cerebrospinal Fluid     Vitamin B1 [[id number]] Collected: 06/11/20 1445    Order Status: Sent Lab Status: In process Updated: 06/11/20 1741    Specimen: Blood     Vitamin B6 [[id number]] Collected: 06/11/20 1446    Order Status: Sent Lab Status: In process Updated: 06/11/20 1741    Specimen: Blood          Medications:  Reconciled Home Medications:      Medication List      START taking these medications    cyanocobalamin 1000 MCG tablet  Commonly known as: VITAMIN B-12  Take 1 tablet (1,000 mcg total) by mouth once daily.     ergocalciferol 50,000 unit Cap  Commonly known as: ERGOCALCIFEROL  Take 1 capsule (50,000 Units total) by mouth every 7 days.  Start taking on: June 20, 2020     LORazepam 0.5 MG tablet  Commonly known as: ATIVAN  Take 1 tablet (0.5 mg total) by mouth every 8 (eight) hours as needed for Anxiety.     nicotine 21 mg/24 hr  Commonly known as: NICODERM CQ  Place 1 patch onto the skin once daily.  Start taking on: Caitlin 15, 2020         CONTINUE taking these medications    CITALOPRAM ORAL  Take by mouth.            Indwelling Lines/Drains at time of discharge:   Lines/Drains/Airways     None                 Time spent on the discharge of patient: > 30 minutes  Patient was seen and examined on the date of discharge and determined to be suitable for discharge.         Janny Covington PA-C  Department of Hospital Medicine  [facility]

## 2020-06-14 NOTE — NURSING
Discharge instructions and follow up reviewed with pt.  Pt denies questions at this time.  IV removed.  Telemetry box returned.  Pt waiting on ride to arrive.

## 2020-06-15 LAB
FINAL PATHOLOGIC DIAGNOSIS: NORMAL
HSV1, PCR, CSF: NEGATIVE
HSV2, PCR, CSF: NEGATIVE

## 2020-06-16 LAB
PYRIDOXAL SERPL-MCNC: 6 UG/L (ref 5–50)
VIT B1 BLD-MCNC: 56 UG/L (ref 38–122)

## 2020-06-17 LAB
ALBUMIN CSF-MCNC: 18.9 MG/DL
ALBUMIN SERPL-MCNC: 3710 MG/DL (ref 3200–4800)
BACTERIA CSF CULT: NO GROWTH
GRAM STN SPEC: NORMAL
IGG CSF-MCNC: 7.2 MG/DL
IGG SERPL-MCNC: 922 MG/DL (ref 767–1590)
IGG SYNTH RATE SER+CSF CALC-MRATE: 24.11 MG/24 H
IGG/ALB CLEAR SER+CSF-RTO: 1.52
IGG/ALB CSF: 0.38 {RATIO}
IGG/ALB SER: 0.25 {RATIO}
OLIGOCLONAL BANDS CSF ELPH-IMP: 13 BANDS
OLIGOCLONAL BANDS CSF ELPH-IMP: 13 BANDS
OLIGOCLONAL BANDS SERPL: 0 BANDS

## 2020-06-18 ENCOUNTER — TELEPHONE (OUTPATIENT)
Dept: NEUROLOGY | Facility: CLINIC | Age: 35
End: 2020-06-18

## 2020-06-19 LAB
EBV DNA SPEC QL NAA+PROBE: NOT DETECTED
SPECIMEN SOURCE: NORMAL

## 2020-06-23 LAB — ACE CSF-CCNC: 1 U/L (ref 0–2.5)

## 2020-06-24 ENCOUNTER — LAB VISIT (OUTPATIENT)
Dept: LAB | Facility: HOSPITAL | Age: 35
End: 2020-06-24
Payer: COMMERCIAL

## 2020-06-24 ENCOUNTER — OFFICE VISIT (OUTPATIENT)
Dept: NEUROLOGY | Facility: CLINIC | Age: 35
End: 2020-06-24
Payer: COMMERCIAL

## 2020-06-24 VITALS — WEIGHT: 196 LBS | BODY MASS INDEX: 33.46 KG/M2 | HEIGHT: 64 IN

## 2020-06-24 DIAGNOSIS — Z71.89 COUNSELING REGARDING GOALS OF CARE: ICD-10-CM

## 2020-06-24 DIAGNOSIS — G35 MULTIPLE SCLEROSIS: Primary | ICD-10-CM

## 2020-06-24 DIAGNOSIS — E55.9 VITAMIN D INSUFFICIENCY: ICD-10-CM

## 2020-06-24 DIAGNOSIS — G35 MULTIPLE SCLEROSIS: ICD-10-CM

## 2020-06-24 LAB — TSH SERPL DL<=0.005 MIU/L-ACNC: 0.81 UIU/ML (ref 0.4–4)

## 2020-06-24 PROCEDURE — 99999 PR PBB SHADOW E&M-EST. PATIENT-LVL III: CPT | Mod: PBBFAC,,, | Performed by: PHYSICIAN ASSISTANT

## 2020-06-24 PROCEDURE — 86359 T CELLS TOTAL COUNT: CPT

## 2020-06-24 PROCEDURE — 99354 PR PROLONGED SVC, OUPT, 1ST HR: ICD-10-PCS | Mod: S$GLB,,, | Performed by: PHYSICIAN ASSISTANT

## 2020-06-24 PROCEDURE — 86360 T CELL ABSOLUTE COUNT/RATIO: CPT

## 2020-06-24 PROCEDURE — 99215 PR OFFICE/OUTPT VISIT, EST, LEVL V, 40-54 MIN: ICD-10-PCS | Mod: S$GLB,,, | Performed by: PHYSICIAN ASSISTANT

## 2020-06-24 PROCEDURE — 3008F PR BODY MASS INDEX (BMI) DOCUMENTED: ICD-10-PCS | Mod: CPTII,S$GLB,, | Performed by: PHYSICIAN ASSISTANT

## 2020-06-24 PROCEDURE — 99354 PR PROLONGED SVC, OUPT, 1ST HR: CPT | Mod: S$GLB,,, | Performed by: PHYSICIAN ASSISTANT

## 2020-06-24 PROCEDURE — 99999 PR PBB SHADOW E&M-EST. PATIENT-LVL III: ICD-10-PCS | Mod: PBBFAC,,, | Performed by: PHYSICIAN ASSISTANT

## 2020-06-24 PROCEDURE — 86618 LYME DISEASE ANTIBODY: CPT

## 2020-06-24 PROCEDURE — 36415 COLL VENOUS BLD VENIPUNCTURE: CPT

## 2020-06-24 PROCEDURE — 86147 CARDIOLIPIN ANTIBODY EA IG: CPT | Mod: 59

## 2020-06-24 PROCEDURE — 3008F BODY MASS INDEX DOCD: CPT | Mod: CPTII,S$GLB,, | Performed by: PHYSICIAN ASSISTANT

## 2020-06-24 PROCEDURE — 99215 OFFICE O/P EST HI 40 MIN: CPT | Mod: S$GLB,,, | Performed by: PHYSICIAN ASSISTANT

## 2020-06-24 PROCEDURE — 84443 ASSAY THYROID STIM HORMONE: CPT

## 2020-06-24 RX ORDER — METHOCARBAMOL 500 MG/1
500 TABLET, FILM COATED ORAL
COMMUNITY
Start: 2020-04-17 | End: 2022-06-27 | Stop reason: SDUPTHER

## 2020-06-24 RX ORDER — GLATIRAMER 40 MG/ML
40 INJECTION, SOLUTION SUBCUTANEOUS
Qty: 36 SYRINGE | Refills: 1 | Status: SHIPPED | OUTPATIENT
Start: 2020-06-24 | End: 2020-10-14 | Stop reason: ALTCHOICE

## 2020-06-24 NOTE — LETTER
June 26, 2020      Wandy Avalos MD  [address]  [address]  [facility] [address]           [facility]  [address]  [facility] [address]  Phone: [phone]          Patient: Cari Barillas   MR Number: [id number]   YOB: 1985   Date of Visit: 6/24/2020       Dear Dr. Wandy Avalos:    Thank you for referring Cari Barillas to me for evaluation. Attached you will find relevant portions of my assessment and plan of care.    If you have questions, please do not hesitate to call me. I look forward to following Cari Barillas along with you.    Sincerely,    Farida Noyola PA-C    Enclosure  CC:  No Recipients    If you would like to receive this communication electronically, please contact [email or web address] or [phone] to request more information on [employer] access.    For providers and/or their staff who would like to refer a patient to [facility], please contact us through our one-stop-shop provider referral line, [facility] [place], at [phone].    If you feel you have received this communication in error or would no longer like to receive these types of communications, please e-mail [email or web address]

## 2020-06-24 NOTE — PROGRESS NOTES
"Neurology Consultation    Reason for consult:  MS establish care           History of present illness:   Per hospital note: 34 y.o. female admitted(6/10/2020-6/19/2020) with worsening numbness over the left upper and lower extremities, suspected MS flare (new diagnosis). MRI brain revealed 2 enhancing lesions, 1 measuring 1.2 cm 2 along the splenium of the corpus callosum on the left, 0.7 cm enhancing focus in the juxtacortical white matter of the right parietal lobe. Two nonenhancing lesions are noted in the left posterior temporal lobe and right parietal juxtacortical white matter. Neurology consulted. Multiple enhancing brain lesions- concern for clinically isolated syndrome/multiple sclerosis vs metastatic lesions. S/p LP, MRI cervical and thoracic spine essentially unremarkable, No cord lesions identified. No acute pathology noted within the chest, abdomen or pelvis.  No lymphadenopathy identified. S/p 3 days of 1 g solumedrol. Patient is stable for discharge, will follow with Neurology    Per Dr Avalos(neurology: "Spoke with patient about MRI brain, CSF results. She has lesions in the juxtacortical region as well as two enhancing lesions one off which( corpus callosum) is in the periventricular region when viewed on sagittal sections.     Given evidence of dissemination in time and space I believe she meets criteria for MS. CSF WBC is less than 50 with normal protein and glucose. I recommended that she confirm the diagnosis with Dr. Oro to talk about further management options.  She is completing 3 days of iv solumedrol .     Additionally she states she had tingling involving the right upper extremity one year ago which lasted one month.went to ER(was also experiencing dizziness) This may qualify for dissemination in time in addition to the enhancing and non-enhancing lesions she has. Some CSF results are currently pending( CSF cytology)and I recommended that she follow up the results with Dr. Oro when she " "sees her.  CT chest, abdomen and pelvis was ordered given concern for lymphoma per [facility] reading. This is negative for lymphoma/ underlying malignancy."    Patient states after steroids her L arm felt normal, but still had some remaining weakness/numbness in L LE for a couple of days-this has now resolved. Continues to have "annoying headache" 2-3/10. Appeared to tolerate steroids well.   .    Migraines a couple times a year with dizziness. Started early twenties.  Has gained about 40 pounds in the last 5-6 years    Employement: [employer]      Review of systems:   CONSTITUTIONAL: + weight gain, - fever, - chills, +  Fatigue(difficult for the last couple of years).   HEENT: Eyes: No visual loss, blurred vision, double vision or yellow sclerae. Ears, Nose, Throat: No hearing loss, sneezing, congestion, runny nose or sore throat.   SKIN: No rash or itching.   CARDIOVASCULAR: No chest pain, chest pressure or chest discomfort. No palpitations or edema.   RESPIRATORY: No shortness of breath, cough or sputum.   GASTROINTESTINAL:  A lot of stomach issues. Is intolerate to gluten and many other foods. Otherwise. Ok   GENITOURINARY: some stress incontinence(coughing/sneezing).   NEUROLOGICAL: As in HPI   MUSCULOSKELETAL: No muscle weakness, does have left leg spasms mostly at night  HEMATOLOGIC: No anemia, bleeding or bruising.   LYMPHATICS: No enlarged nodes.  PSYCHIATRIC: depression/anxiety-started Citalopram and xanax(taking rarely a couple times a month)-looking for new provider   depression or anxiety.   ENDOCRINOLOGIC: No reports of sweating, cold or heat intolerance. No polyuria or polydipsia.     Past Medical History:   Diagnosis Date    Anxiety     Depression     H/O LEEP        Past Surgical History:   Procedure Laterality Date    TONSILLECTOMY         History reviewed. No pertinent family history.    Social History     Socioeconomic History    Marital status: Single     Spouse " "name: Not on file    Number of children: Not on file    Years of education: Not on file    Highest education level: Not on file   Occupational History    Not on file   Social Needs    Financial resource strain: Not on file    Food insecurity     Worry: Not on file     Inability: Not on file    Transportation needs     Medical: Not on file     Non-medical: Not on file   Tobacco Use    Smoking status: Current Every Day Smoker     Packs/day: 0.50   Substance and Sexual Activity    Alcohol use: Yes     Frequency: Never     Comment: social    Drug use: Never    Sexual activity: Not on file   Lifestyle    Physical activity     Days per week: Not on file     Minutes per session: Not on file    Stress: Not on file   Relationships    Social connections     Talks on phone: Not on file     Gets together: Not on file     Attends Shinto service: Not on file     Active member of club or organization: Not on file     Attends meetings of clubs or organizations: Not on file     Relationship status: Not on file   Other Topics Concern    Not on file   Social History Narrative    Not on file     Comprehensive questionnaire completed and reviewed    Review of patient's allergies indicates:   Allergen Reactions    Gluten protein     Soy          Physical Exam    Vitals:    06/24/20 1012   Weight: 88.9 kg (195 lb 15.8 oz)   Height: 5' 4" (1.626 m)       In general, the patient is well nourished.    No bruits. Fundi are normal bilaterally.    MENTAL STATUS: language is fluent, normal verbal comprehension, short-term and remote memory is intact, attention is normal, patient is alert and oriented x 3, fund of knowlege is appropriate by vocabulary.     CRANIAL NERVE EXAM:  There is no intrernuclear ophthalmoplegia.  Extraocular muscles are intact. Pupils are equal, round, and reactive to light. No facial asymmetry. Facial sensation is intact bilaterally. There is no dysarthria. Uvula is midline, and palate moves " symmetrically. Shoulder shrug intact bilaterlly. Tongue protrusion is midline. Hearing is grossly intact. Neck is supple. Acuity: 20/20 OD and OS with snellen hand held chart    MOTOR EXAM: Normal bulk and tone throughout UE and LE bilaterally.   No pronator drift; rapid sequential movements are normal; Strength is  5/5 in all groups in the lower extremities and upper extremities;    REFLEXES: 3+ and symmetric throughout in all four extremeties; toes are down bilaterally    SENSORY EXAM: Normal to light touch, vibration impaired bilateral LE's R>L  COORDINATION: Normal finger-to-nose exam     GAIT: Narrow based and stable, normal tandem, able to hop on each foot independently        IMAGING (personally reviewed):   MRI Brain Demyelinating W W/O Contrast  Order: [id number]  Status:  Final result   Visible to patient:  No (not released) Next appt:  Today at 09:45 AM in Neurology (Farida Noyola PA-C)  Details    Reading Physician Reading Date Result Priority   Levon Sierra MD  [phone] 6/11/2020       Narrative & Impression     EXAMINATION:  MRI CERVICAL SPINE DEMYELINATING W W/O CONTRAST; MRI BRAIN DEMYELINATING W/ WO CONTRAST     CLINICAL HISTORY:  Neck pain, prior xray, abn neuro exam;Neck pain, first study;; Multiple sclerosis, new neurological event;     TECHNIQUE:  Multiplanar, multisequence MR images of the brain and cervical spine were performed before and after administration 8.5 mL Gadavist intravenous contrast.     Examination mildly degraded by patient motion artifact.     COMPARISON:  CT head 06/10/2020     FINDINGS:  Brain:     Ventricles normal in size for age.  No hydrocephalus.     1.2 cm dome shaped enhancing lesion with associated diffusion restriction centered in the left forceps of the splenium of the corpus callosum.  Enhancement extending to the ependymal margin of atrium of lateral ventricle.  Mild surrounding T2/FLAIR signal hyperintensity additional 0.7 cm rounded enhancing focus in  the juxta cortical white matter of the paramedian right parietal lobe.     0.6 cm nonenhancing T2/FLAIR hyperintense focus in the juxtacortical white matter of the posterior left temporal lobe.  0.3 cm nonenhancing T2/FLAIR hyperintense focus in the paramedian right parietal juxtacortical white matter.  No infratentorial lesions.     No parenchymal mass effect.  No recent or remote major vascular distribution infarct.  No recent or remote hemorrhage.     No extra-axial blood or fluid collections.     The T2 skull base flow voids are preserved.  Bone marrow signal intensity unremarkable.     Spine:     The vertebral bodies are normal in height, morphology and signal.  No fracture or osseous destructive process identified.     Normal sagittal alignment is preserved.     No advanced degenerative change.  Intervertebral disc heights are fairly well maintained.  No significant spinal canal stenosis or high-grade neural foraminal narrowing.     The cord maintains normal morphology and signal without focal lesion.  No abnormal postcontrast enhancement.     Paraspinal soft tissue structures demonstrate no acute abnormalities.     Impression:     Examination mildly degraded by patient motion artifact.     2 solidly enhancing lesions in the supratentorial white matter as above.  Two additional subcentimeter nonenhancing lesions elsewhere.  Although not entirely specific, findings concerning for demyelinating disease (such as multiple sclerosis) with areas of active demyelination.  Lymphoma or other process not entirely excluded.  Clinical correlation required with follow-up suggested     Cervical spine appears within normal limits.  No cervical cord lesions identified.     This report was flagged in Epic as abnormal.        Electronically signed by: Levon Sierra MD  Date:                                            06/11/2020  Time:                                           10:13             Last Resulted: 06/11/20 10:13  Order Details View Encounter Lab and Collection Details            Contains abnormal data MRI Cervical Spine Demyelinating W W/O Contrast  Order: [id number]  Status:  Final result   Visible to patient:  No (not released) Next appt:  Today at 09:45 AM in Neurology (Farida Noyola PA-C)  Details    Reading Physician Reading Date Result Priority   Levon Sierra MD  [phone] 6/11/2020 Routine      Narrative & Impression     EXAMINATION:  MRI CERVICAL SPINE DEMYELINATING W W/O CONTRAST; MRI BRAIN DEMYELINATING W/ WO CONTRAST     CLINICAL HISTORY:  Neck pain, prior xray, abn neuro exam;Neck pain, first study;; Multiple sclerosis, new neurological event;     TECHNIQUE:  Multiplanar, multisequence MR images of the brain and cervical spine were performed before and after administration 8.5 mL Gadavist intravenous contrast.     Examination mildly degraded by patient motion artifact.     COMPARISON:  CT head 06/10/2020     FINDINGS:  Brain:     Ventricles normal in size for age.  No hydrocephalus.     1.2 cm dome shaped enhancing lesion with associated diffusion restriction centered in the left forceps of the splenium of the corpus callosum.  Enhancement extending to the ependymal margin of atrium of lateral ventricle.  Mild surrounding T2/FLAIR signal hyperintensity additional 0.7 cm rounded enhancing focus in the juxta cortical white matter of the paramedian right parietal lobe.     0.6 cm nonenhancing T2/FLAIR hyperintense focus in the juxtacortical white matter of the posterior left temporal lobe.  0.3 cm nonenhancing T2/FLAIR hyperintense focus in the paramedian right parietal juxtacortical white matter.  No infratentorial lesions.     No parenchymal mass effect.  No recent or remote major vascular distribution infarct.  No recent or remote hemorrhage.     No extra-axial blood or fluid collections.     The T2 skull base flow voids are preserved.  Bone marrow signal intensity unremarkable.     Spine:     The  vertebral bodies are normal in height, morphology and signal.  No fracture or osseous destructive process identified.     Normal sagittal alignment is preserved.     No advanced degenerative change.  Intervertebral disc heights are fairly well maintained.  No significant spinal canal stenosis or high-grade neural foraminal narrowing.     The cord maintains normal morphology and signal without focal lesion.  No abnormal postcontrast enhancement.     Paraspinal soft tissue structures demonstrate no acute abnormalities.     Impression:     Examination mildly degraded by patient motion artifact.     2 solidly enhancing lesions in the supratentorial white matter as above.  Two additional subcentimeter nonenhancing lesions elsewhere.  Although not entirely specific, findings concerning for demyelinating disease (such as multiple sclerosis) with areas of active demyelination.  Lymphoma or other process not entirely excluded.  Clinical correlation required with follow-up suggested     Cervical spine appears within normal limits.  No cervical cord lesions identified.     This report was flagged in Epic as abnormal.        Electronically signed by: Levon Sierra MD  Date:                                            06/11/2020  Time:                                           10:13           MRI Thoracic Spine Demyelinating W W/O Contrast  Order: [id number]  Status:  Final result   Visible to patient:  No (not released) Next appt:  Today at 09:45 AM in Neurology (Farida Noyola PA-C)  Details    Reading Physician Reading Date Result Priority   Corey BERENICEMarianela Torrez,   [phone] 6/13/2020 Routine      Narrative & Impression     EXAMINATION:  MRI THORACIC SPINE DEMYELINATING W W/O CONTRAST     CLINICAL HISTORY:  lesions on MRI brain and cervical spine;     TECHNIQUE:  Multiplanar multisequence MR imaging of the thoracic spine was performed with and without contrast utilizing a demyelinating protocol.  8.5 cc of Gadavist was  administered without immediate complication.     COMPARISON:  MRI of the cervical spine from 06/11/2020     FINDINGS:  The vertebral bodies demonstrate a normal height and alignment.  The discs are well hydrated.  No central canal narrowing.  No significant disc protrusions are identified.  The T1 marrow signal is within normal limits.  Incidental note made of a benign bone hemangioma at the T7 level.     The paravertebral soft tissues are unremarkable in appearance.     The cord is normal in signal and caliber.  No abnormal enhancement noted on the postcontrast images.     Impression:     1. Essentially unremarkable MRI of the thoracic spine with and without contrast.  No cord lesions identified.        Electronically signed by: Corey Torrez DO  Date:                                            06/13/2020  Time:                                           10:12                  LABS:  Lab Results   Component Value Date    WBC 10.63 06/12/2020    HGB 13.7 06/12/2020    HCT 41.5 06/12/2020    MCV 91 06/12/2020     06/12/2020     CMP  Sodium   Date Value Ref Range Status   06/12/2020 138 136 - 145 mmol/L Final     Potassium   Date Value Ref Range Status   06/12/2020 4.2 3.5 - 5.1 mmol/L Final     Chloride   Date Value Ref Range Status   06/12/2020 107 95 - 110 mmol/L Final     CO2   Date Value Ref Range Status   06/12/2020 21 (L) 23 - 29 mmol/L Final     Glucose   Date Value Ref Range Status   06/12/2020 95 70 - 110 mg/dL Final     BUN, Bld   Date Value Ref Range Status   06/12/2020 9 6 - 20 mg/dL Final     Creatinine   Date Value Ref Range Status   06/12/2020 0.7 0.5 - 1.4 mg/dL Final     Calcium   Date Value Ref Range Status   06/12/2020 9.3 8.7 - 10.5 mg/dL Final     Total Protein   Date Value Ref Range Status   06/12/2020 6.7 6.0 - 8.4 g/dL Final     Albumin   Date Value Ref Range Status   06/12/2020 3.5 3.5 - 5.2 g/dL Final     Total Bilirubin   Date Value Ref Range Status   06/12/2020 0.3 0.1 - 1.0 mg/dL  Final     Comment:     For infants and newborns, interpretation of results should be based  on gestational age, weight and in agreement with clinical  observations.  Premature Infant recommended reference ranges:  Up to 24 hours.............<8.0 mg/dL  Up to 48 hours............<12.0 mg/dL  3-5 days..................<15.0 mg/dL  6-29 days.................<15.0 mg/dL       Alkaline Phosphatase   Date Value Ref Range Status   06/12/2020 85 55 - 135 U/L Final     AST   Date Value Ref Range Status   06/12/2020 17 10 - 40 U/L Final     ALT   Date Value Ref Range Status   06/12/2020 23 10 - 44 U/L Final     Anion Gap   Date Value Ref Range Status   06/12/2020 10 8 - 16 mmol/L Final     eGFR if    Date Value Ref Range Status   06/12/2020 >60 >60 mL/min/1.73 m^2 Final     eGFR if non    Date Value Ref Range Status   06/12/2020 >60 >60 mL/min/1.73 m^2 Final     Comment:     Calculation used to obtain the estimated glomerular filtration  rate (eGFR) is the CKD-EPI equation.        Contains abnormal data Ms Profile  Order: [id number]  Status:  Final result   Visible to patient:  No (not released) Next appt:  Today at 09:45 AM in Neurology (Farida Noyola PA-C)   Ref Range & Units 13d ago   CSF Bands bands 13    Olig Bands Interpretation, CSF <4 bands 13Abnormal     Comment: The oligoclonal band assay detected 4 or more unique IgG   bands in the CSF. This is a positive result.   CSF is used in the diagnosis of MS by identifying increased   intrathecal IgG synthesis qualitatively (Oligoclonal Bands)   or quantitatively (IgG index or IgG synthesis rate, CSF).   Oligoclonal bands (4 or more CSF-specific bands) and/or an   elevated CSF IgG index are detected in up to 90% of   patients with MS. These findings, however, are not specific   for MS as CSF-specific IgG synthesis may also be found in   patients with other neurologic diseases including   infectious, inflammatory, cerebrovascular, and    paraneoplastic disorders.    Serum Bands bands 0    IgG Index, CSF <=0.85 1.52High     IgG, CSF <=8.1 mg/dL 7.2    Albumin, CSF <=27.0 mg/dL 18.9    IgG/Albumin Ratio, CSF <=0.21 0.38High     IgG Synthetic Rate <=12 mg/24 h 24.11High     IgG,S 767 - 1590 mg/dL 922    Albumin, Serum 3200 - 4800 mg/dL 3710    IgG/Albumin, S <=0.40 0.25    Comment: Test Performed by:   [facility]   [address] [place] [address]   : Marcus Kasper M.D. Ph.D.; CLIA# [id number]    Resulting Agency  [facility]         Specimen Collected: 06/11/20 12:59 Last Resulted: 06/17/20 16:40                    ASSESSMENT:        1.  Enhancing and non-enhancing Brain lesions, no spinal cord lesions     2.  Decreased vibratory sense bilateral LE's      3.  Vit D deficient      4. Positive MS panel with 13 OCB isolated and elevated IgG index            DISCUSSION:   [facility] to [facility] for confirmation of MS diagnosis after recent hospitalization earlier this month. Her symptoms have resolved well after high dose solumedrol. She has met criteria for MS with positive MS Panel(CSF), along with both enhancing and non enhancing lesions on MRI. She possibly had a previous episode of tingling in R UE; however, this correlated with vascular changes(pictures shown to me) and was seen in the ER for possible DVT-this would not necessarily be typical of MS relapse. I would like to do just a few additional labs for completeness. We discussed the importance of starting DMT and after discussion of several DMT(injectables, orals, infusions) she would prefer to start with non-immunosuppressive agent and with history of depression I have initiated Copaxone with patient in agreement. I also encouraged smoking cessation, healthy diet and exercise, along with Vit D3.        RECOMMENDATIONS:  1. Copaxone sent to [facility]           2. Magnesium 400mg at bedtime           3. Smoking cessation_[facility]  handout provided           4. MS yoga-info and card given  5. Continue Vit D3 50,000IU weekly, once prescription is complete switch to 5,000IU/d ([employer])  6.  Labs today               Multiple sclerosis  -     [place]-Suppressor Ratio; Future; Expected date: 06/24/2020  -     B. burgdorferi Abs (Lyme Disease); Future; Expected date: 06/24/2020  -     Cardiolipin antibody; Future; Expected date: 06/24/2020  -     TSH; Future; Expected date: 06/24/2020  -     Stratify JCV Antibody (with Index); Future; Expected date: 06/24/2020  -     MRI Brain Demyelinating W W/O Contrast; Future; Expected date: 07/24/2020      Patient was seen for90 minutes face to face encounter today -100% to review history, MRI's, labs, diagnosis, DMT.   Follow up in about 3 months (around 9/24/2020). with Dr. Oro  Patient agreed to POC today.    Attending, Dr. Agrawal, was available during today's encounter.     Farida Noyola PA-C  [facility]

## 2020-06-24 NOTE — PATIENT INSTRUCTIONS
Glatiramer injection  What is this medicine?  GLATIRAMER (gla TIR a carlos) helps to decrease the number of multiple sclerosis relapses in people with relapsing-remitting forms of the disease. The medicine does not cure multiple sclerosis.  How should I use this medicine?  This medicine is for injection under the skin. You will be taught how to prepare and give this medicine. Use exactly as directed. Take your medicine at regular intervals. Do not take your medicine more often than directed. Do not stop taking except on your doctor's advice.  It is important that you put your used needles and syringes in a special sharps container. Do not put them in a trash can. If you do not have a sharps container, call your pharmacist or healthcare provider to get one.  Talk to your pediatrician regarding the use of this medicine in children. Special care may be needed.  What side effects may I notice from receiving this medicine?  Side effects that you should report to your doctor or health care professional as soon as possible:  · allergic reactions like skin rash, itching or hives, swelling of the face, lips, or tongue  · chest pain or tightness  · difficulty breathing  · fever, chills, or any other sign of infection  · rapid heartbeat or palpitations  · severe pain at the injection site  · swelling of the ankles  Side effects that usually do not require medical attention (report to your doctor or health care professional if they continue or are bothersome):  · anxiety  · dizziness  · drowsiness  · flushing  · joint aches  · nausea, vomiting  · pain, redness, itching, or irritation at the injection site  · tremor  · weakness  What may interact with this medicine?  Interactions are not expected.  What if I miss a dose?  If you miss a dose, take it as soon as you can. If it is almost time for your next dose, take only that dose. Do not take double or extra doses.  Where should I keep my medicine?  Keep out of the reach of  children.  Store in a refrigerator between 2 and 8 degrees C (36 and 46 degrees F). An unused prefilled syringe may be stored for up to 7 days between 15 and 30 degrees C (59 and 86 degrees F). Do not freeze. Protect from light. Throw away any unused diluted injection. Throw away any unused medicine after the expiration date.  What should I tell my health care provider before I take this medicine?  They need to know if you have any of these conditions:  · immune system problems  · infection  · an unusual or allergic reaction to glatiramer, mannitol, other medicines, foods, dyes, or preservatives  · pregnant or trying to get pregnant  · breast-feeding  What should I watch for while using this medicine?  Visit your doctor or health care professional for regular checks on your progress.  NOTE:This sheet is a summary. It may not cover all possible information. If you have questions about this medicine, talk to your doctor, pharmacist, or health care provider. Copyright© 2017 [employer]

## 2020-06-25 ENCOUNTER — NURSE TRIAGE (OUTPATIENT)
Dept: ADMINISTRATIVE | Facility: CLINIC | Age: 35
End: 2020-06-25

## 2020-06-25 ENCOUNTER — TELEPHONE (OUTPATIENT)
Dept: PHARMACY | Facility: CLINIC | Age: 35
End: 2020-06-25

## 2020-06-25 LAB
ABSOLUTE CD3: 2529 CELLS/UL (ref 700–2100)
ABSOLUTE CD8: 481 CELLS/UL (ref 200–900)
CD3%: 82.3 % (ref 55–83)
CD3+CD4+ CELLS # BLD: 2031 CELLS/UL (ref 300–1400)
CD3+CD4+ CELLS NFR BLD: 66.1 % (ref 28–57)
CD4/CD8 RATIO: 4.22 (ref 0.9–3.6)
CD8 % SUPPRESSOR T CELL: 15.6 % (ref 10–39)

## 2020-06-25 NOTE — TELEPHONE ENCOUNTER
Post Procedural Symptom Tracker. Pt had an office visit on 6/24/20. Per symptom tracker protocol, no contact made. No follow up needed.      Reason for Disposition   Patient already left for the hospital/clinic    Additional Information   Negative: Caller has already spoken with the PCP (or office), and has no further questions   Negative: Caller has already spoken with another triager and has no further questions   Negative: Caller has already spoken with another triager or PCP (or office), and has further questions and triager able to answer questions.   Negative: Busy signal.  First attempt to contact caller.  Follow-up call scheduled within 15 minutes.   Negative: No answer.  First attempt to contact caller.  Follow-up call scheduled within 15 minutes.   Negative: Message left on identified voicemail   Negative: Message left on unidentified voice mail. Phone number verified.   Negative: Message left with person in household   Negative: Wrong number reached. Phone number verified.   Negative: Second attempt to contact family AND no contact made. Phone number verified.   Negative: Cell phone out of range. Phone number verified.    Protocols used: NO CONTACT OR DUPLICATE CONTACT CALL-A-OH

## 2020-06-25 NOTE — TELEPHONE ENCOUNTER
FOR DOCUMENTATION ONLY:  Financial Assistance for Glatopa approved  Source: Copay Card  BIN: [id number]  ID: [id number]  Group: [id number]

## 2020-06-25 NOTE — TELEPHONE ENCOUNTER
DOCUMENTATION ONLY:  Prior authorization for Glatopa approved from 6/24/20 to 6/24/22    Case Id: [id number]    Co-pay: $695.52, patient assistance is being researched.

## 2020-06-26 ENCOUNTER — TELEPHONE (OUTPATIENT)
Dept: PHARMACY | Facility: CLINIC | Age: 35
End: 2020-06-26

## 2020-06-26 PROBLEM — G35 MULTIPLE SCLEROSIS: Status: ACTIVE | Noted: 2020-06-26

## 2020-06-26 LAB — B BURGDOR AB SER IA-ACNC: 0.21 INDEX VALUE

## 2020-06-26 NOTE — TELEPHONE ENCOUNTER
Initial Glatiramer 40mg consult completed on . Glatiramer will be shipped on  to arrive at patient's home on  via [employer]. $0.00 copay. Patient intends to start Glatiramer on . Address confirmed, CC on file. Confirmed 2 patient identifiers - name and . Therapy Appropriate.    Patient was recently diagnosed with MS after she experienced numbness and tingling in her hands and thigh on her left side. She states that she was contacted by the [employer] people and she will be getting the Glatopaject next week.    --Injection experience: None, patient plans to self inject    Indication:  Goals of Treatment:    Storage: keep refrigerated, avoid heat and intense light, do not freeze    Counseled patient on administration directions:   Inject Glatiramer into the skin 3 times per week (separate by 48 hours)   Missed doses: Patient may dose next day, shift all injections that week, then resume normal dose schedule next week. A partial dose should never be repeated.    Take out of the refrigerator 30-60 minutes prior to injection.   Wash hands before and after injection.   Monthly RX will come with gauze, Band-Aids, and alcohol swabs.   Patient may inject in either the tops of thighs or abdomen- but at least 2 inches away from the belly button, upper hips, but always below the waist, or the outer or back part of his/ her upper arm.   Patient is to wipe down the injection site with the alcohol pad, wait to dry.  Pinch about a 2 inch fold of skin between the thumb and index finger, insert the needle at a 90 degree angle straight into the skin.  Hold the syringe steady and slowly push down the plunger, then remove the needle.    Patient will use sharps container; once full, per [place] law, he/she may lock the sharps container and place in the trash. He/ She can then contact the Pharmacy and we will replace the sharps at no additional charge.    Patient was counseled on possible side effects which include,  but are not limited to:  · Injection site reaction: redness, soreness, itching, bruising, lipoatrophy, swelling, lumps, pain and rash  · Flushing, chest pain, palpitations, anxiety, dyspnea, constriction of the throat, and urticaria.  · These symptoms are generally transient and self-limited and do not require specific treatment.   · may occur early or may have their onset several months after the initiation of treatment  · Infection, weakness, upset stomach, flushing, back pain  · Nausea and vomiting   · ER precautions advised for signs and symptoms of allergic reaction    Med Rec completed: No significant DDIs with glatiramer encountered.     Patient was advised to keep a calendar or download an injection herb to stay compliant. Consultation included: the importance of compliance and of keeping all follow up appointments.  Patient understands to report any medication changes to [facility] and provider. All questions answered and addressed to patients satisfaction. [facility] to contact patient in 3 weeks for refills.    Kendrick Olivia, PharmD  Clinical Pharmacist  [facility]  P: [phone]

## 2020-06-26 NOTE — TELEPHONE ENCOUNTER
Call for Initial consult for Glatopa. Patient answered, verified name and . Patient requested that she call [facility] back in about 20 minutes. Will oblige, will follow up appropriately if no call back.      Kendrick Olivia, PharmD  Clinical Pharmacist  [facility]  P: [phone]

## 2020-06-30 LAB
JCPYV AB SERPL QL IA: POSITIVE
JCV INDEX: 0.66

## 2020-07-02 ENCOUNTER — CLINICAL SUPPORT (OUTPATIENT)
Dept: SMOKING CESSATION | Facility: CLINIC | Age: 35
End: 2020-07-02

## 2020-07-02 DIAGNOSIS — F17.210 MODERATE CIGARETTE SMOKER (10-19 PER DAY): Primary | ICD-10-CM

## 2020-07-02 LAB
CARDIOLIPIN IGG SER IA-ACNC: <9.4 GPL (ref 0–14.99)
CARDIOLIPIN IGM SER IA-ACNC: 12.5 MPL (ref 0–12.49)

## 2020-07-02 PROCEDURE — 99404 PREV MED CNSL INDIV APPRX 60: CPT | Mod: S$GLB,,,

## 2020-07-02 PROCEDURE — 99999 PR PBB SHADOW E&M-EST. PATIENT-LVL I: CPT | Mod: PBBFAC,,,

## 2020-07-02 PROCEDURE — 99404 PR PREVENT COUNSEL,INDIV,60 MIN: ICD-10-PCS | Mod: S$GLB,,,

## 2020-07-02 PROCEDURE — 99999 PR PBB SHADOW E&M-EST. PATIENT-LVL I: ICD-10-PCS | Mod: PBBFAC,,,

## 2020-07-02 RX ORDER — IBUPROFEN 200 MG
1 TABLET ORAL DAILY
Qty: 28 PATCH | Refills: 0 | Status: SHIPPED | OUTPATIENT
Start: 2020-07-02 | End: 2020-09-22

## 2020-07-02 NOTE — Clinical Note
Patient was seen for tobacco cessation intake.  Patient will begin on 21 mg nicotine patch.  Patient has agreed to weekly follow up.

## 2020-07-09 ENCOUNTER — TELEPHONE (OUTPATIENT)
Dept: NEUROLOGY | Facility: CLINIC | Age: 35
End: 2020-07-09

## 2020-07-09 DIAGNOSIS — G35 MULTIPLE SCLEROSIS EXACERBATION: Primary | ICD-10-CM

## 2020-07-10 RX ORDER — METHYLPREDNISOLONE SODIUM SUCCINATE 1 G/16ML
INJECTION INTRAMUSCULAR; INTRAVENOUS
Qty: 3000 MG | Refills: 0 | Status: SHIPPED | OUTPATIENT
Start: 2020-07-10 | End: 2020-07-10 | Stop reason: SDUPTHER

## 2020-07-10 RX ORDER — METHYLPREDNISOLONE SODIUM SUCCINATE 1 G/16ML
INJECTION INTRAMUSCULAR; INTRAVENOUS
Qty: 3 VIAL | Refills: 0 | Status: SHIPPED | OUTPATIENT
Start: 2020-07-10 | End: 2020-08-04 | Stop reason: ALTCHOICE

## 2020-07-13 ENCOUNTER — TELEPHONE (OUTPATIENT)
Dept: NEUROLOGY | Facility: CLINIC | Age: 35
End: 2020-07-13

## 2020-07-13 LAB — FUNGUS SPEC CULT: NORMAL

## 2020-07-13 NOTE — TELEPHONE ENCOUNTER
----- Message from ROBYN Dorsey, CNS sent at 7/1/2020  1:32 PM CDT -----  Hi, Dr. Avalos! I'm forwarding your message to Farida Taverasalton, as she saw the patient in clinical last week.    Roseanna   ----- Message -----  From: Wandy Avalos MD  Sent: 6/30/2020  10:55 PM CDT  To: ROBYN Dorsey, CNS    Hi Roseanna,    Lindy for seeing her. Could you tell me why you chose Copaxone over the other options? Just curious and I would like to learn.    Thanks,    Wandy  ----- Message -----  From: ROBYN Dorsey, ANALI  Sent: 6/30/2020   8:35 AM CDT  To: Wandy Avalos MD

## 2020-07-14 ENCOUNTER — CLINICAL SUPPORT (OUTPATIENT)
Dept: SMOKING CESSATION | Facility: CLINIC | Age: 35
End: 2020-07-14

## 2020-07-14 DIAGNOSIS — F17.210 MODERATE CIGARETTE SMOKER (10-19 PER DAY): Primary | ICD-10-CM

## 2020-07-14 PROCEDURE — 99404 PR PREVENT COUNSEL,INDIV,60 MIN: ICD-10-PCS | Mod: S$GLB,,,

## 2020-07-14 PROCEDURE — 99999 PR PBB SHADOW E&M-EST. PATIENT-LVL I: ICD-10-PCS | Mod: PBBFAC,,,

## 2020-07-14 PROCEDURE — 99999 PR PBB SHADOW E&M-EST. PATIENT-LVL I: CPT | Mod: PBBFAC,,,

## 2020-07-14 PROCEDURE — 99404 PREV MED CNSL INDIV APPRX 60: CPT | Mod: S$GLB,,,

## 2020-07-14 NOTE — Clinical Note
Patient is smoking 10-20 cpd. Patient will rate fade to 5-7 cpd. Patient continue to use 21 mg nicotine patch without any negative side effects at this time.   We discussed cues, triggers, reasons and benefits of quitting.   Patient postponed quit date from last week to this Monday do to health issues and concerns. We discussed tobacco cessation strategies and timeline, benefits of quitting.  We discussed willpower and stress management.   The patient denies any abnormal behavioral or mental changes at this time. The patient will continue with group therapy sessions and medication monitoring by CTTS. Prescribed medication management will be by physician.

## 2020-07-14 NOTE — PROGRESS NOTES
Individual Follow-Up Form    7/14/2020    Quit Date:     Clinical Status of Patient: Outpatient    Length of Service: 60 minutes    Continuing Medication: yes  Patches    Other Medications: none     Target Symptoms: Withdrawal and medication side effects. The following were  rated moderate (3) to severe (4) on TCRS:  · Moderate (3): none  · Severe (4): none    Comments: Patient is smoking 10-20 cpd. Patient will rate fade to 5-7 cpd. Patient continue to use 21 mg nicotine patch without any negative side effects at this time.   We discussed cues, triggers, reasons and benefits of quitting.   Patient postponed quit date from last week to this Monday do to health issues and concerns. We discussed tobacco cessation strategies and timeline, benefits of quitting.  We discussed willpower and stress management.   The patient denies any abnormal behavioral or mental changes at this time. The patient will continue with group therapy sessions and medication monitoring by CTTS. Prescribed medication management will be by physician.       Diagnosis: F17.210    Next Visit: 2 weeks

## 2020-07-15 ENCOUNTER — OFFICE VISIT (OUTPATIENT)
Dept: NEUROLOGY | Facility: CLINIC | Age: 35
End: 2020-07-15
Payer: COMMERCIAL

## 2020-07-15 VITALS
DIASTOLIC BLOOD PRESSURE: 79 MMHG | WEIGHT: 195.69 LBS | SYSTOLIC BLOOD PRESSURE: 115 MMHG | HEART RATE: 66 BPM | TEMPERATURE: 98 F | BODY MASS INDEX: 33.41 KG/M2 | HEIGHT: 64 IN

## 2020-07-15 DIAGNOSIS — R20.9 SENSORY DISTURBANCE: ICD-10-CM

## 2020-07-15 DIAGNOSIS — G35 MULTIPLE SCLEROSIS: Primary | ICD-10-CM

## 2020-07-15 DIAGNOSIS — E55.9 VITAMIN D INSUFFICIENCY: ICD-10-CM

## 2020-07-15 DIAGNOSIS — R25.2 SPASTICITY: ICD-10-CM

## 2020-07-15 DIAGNOSIS — Z71.89 COUNSELING REGARDING GOALS OF CARE: ICD-10-CM

## 2020-07-15 PROCEDURE — 99999 PR PBB SHADOW E&M-EST. PATIENT-LVL III: ICD-10-PCS | Mod: PBBFAC,,, | Performed by: PHYSICIAN ASSISTANT

## 2020-07-15 PROCEDURE — 3008F BODY MASS INDEX DOCD: CPT | Mod: CPTII,S$GLB,, | Performed by: PHYSICIAN ASSISTANT

## 2020-07-15 PROCEDURE — 99999 PR PBB SHADOW E&M-EST. PATIENT-LVL III: CPT | Mod: PBBFAC,,, | Performed by: PHYSICIAN ASSISTANT

## 2020-07-15 PROCEDURE — 99215 PR OFFICE/OUTPT VISIT, EST, LEVL V, 40-54 MIN: ICD-10-PCS | Mod: S$GLB,,, | Performed by: PHYSICIAN ASSISTANT

## 2020-07-15 PROCEDURE — 3008F PR BODY MASS INDEX (BMI) DOCUMENTED: ICD-10-PCS | Mod: CPTII,S$GLB,, | Performed by: PHYSICIAN ASSISTANT

## 2020-07-15 PROCEDURE — 99215 OFFICE O/P EST HI 40 MIN: CPT | Mod: S$GLB,,, | Performed by: PHYSICIAN ASSISTANT

## 2020-07-15 NOTE — PROGRESS NOTES
Subjective:          Patient ID: Cari Barillas is a 34 y.o. female who presents today for a fit-in clinic visit for MS.  [facility] visit to [facility] June 24, 2020 with this provider.     MS HPI:  · DMT: glatiramer acetate-3 rd week of Glatopa  · Side effects from DMT? Yes - mild injection site reaction(last about one hour)  · Taking vitamin D3 as recommended? Yes - 50,000IU/weekly   ·  patient presents for fit in appt after 3 days of oral smoothie steroids for presumed MS relapse. She was experiencing worsening symptoms of L LE(sensory) and experienced R UE sensory symptoms(new) that lasted a day. She tolerated Smoothie well, her R UE symptoms are resolved(lasted one day), but she continues to have a sensory disturbance L LE. Did not experience much improvement with steroids.  · She has not been at work over the weekend. 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 She does feel that she could do more sedentary work. She sent in [facility] paperwork previously to be completed, and has been forwarded to LACIE BotelloJENNIFER    Medications:  Current Outpatient Medications   Medication Sig    citalopram hydrobromide (CITALOPRAM ORAL) Take 40 mg by mouth once daily.     cyanocobalamin (VITAMIN B-12) 1000 MCG tablet Take 1 tablet (1,000 mcg total) by mouth once daily.    ergocalciferol (ERGOCALCIFEROL) 50,000 unit Cap Take 1 capsule (50,000 Units total) by mouth every 7 days.    glatiramer (COPAXONE) 40 mg/mL injection Inject 40 mg into the skin 3 (three) times a week.    LORazepam (ATIVAN) 0.5 MG tablet Take 1 tablet (0.5 mg total) by mouth every 8 (eight) hours as needed for Anxiety.    methocarbamoL (ROBAXIN) 500 MG Tab Take 500 mg by mouth as needed for Migraine.    methylPREDNISolone sodium succinate (SOLU-MEDROL) 1,000 mg injection mix 1 syringe in smoothie and taken orally daily for 3 days.    nicotine (NICODERM CQ) 21 mg/24 hr Place 1 patch  onto the skin once daily.     No current facility-administered medications for this visit.        SOCIAL HISTORY  Social History     Tobacco Use    Smoking status: Current Every Day Smoker     Packs/day: 0.50   Substance Use Topics    Alcohol use: Yes     Frequency: Never     Comment: social    Drug use: Never         ROS:    REVIEW OF SYMPTOMS 7/13/2020   Do you feel abnormally tired on most days? Yes   Do you feel you generally sleep well? Yes   Do you have difficulty controlling your bladder?  No   Do you have difficulty controlling your bowels?  No   Do you have frequent muscle cramps, tightness or spasms in your limbs?  Yes   Do you have new visual symptoms?  No   Do you have worsening difficulty with your memory or thinking? No   Do you have worsening symptoms of anxiety or depression?  No   For patients who walk, Do you have more difficulty walking?  No   Have you fallen since your last visit?  No   For patients who use wheelchairs: Do you have any skin wounds or breakdown? No   Do you have difficulty using your hands?  No   Do you have shooting or burning pain? Yes   Do you have difficulty with sexual function?  No   If you are sexually active, are you using birth control? Y/N  N/A No   Do you often choke when swallowing liquids or solid food?  No   Do you experience worsening symptoms when overheated? Yes   Do you need any new equipment such as a wheelchair, walker or shower chair? No   Do you receive co-pay financial assistance for your principal MS medicine? Yes   Would you be interested in participating in an MS research trial in the future? Yes   For patients on Gilenya, Tecfidera, Aubagio, Rituxan, Ocrevus, Tysabri, Lemtrada or Methotrexate, are you aware that you should NOT receive live virus vaccines?  No   Do you feel you have adequate family/friend support?  Yes   Do you have health insurance?   Yes   Are you currently employed? Yes   Do you receive SSDI/SSI?  Not Applicable   Do you use  marijuana or cannabis products? No   Have you been diagnosed with a urinary tract infection since your last visit here? No   Have you been diagnosed with a respiratory tract infection since your last visit here? No   Have you been to the emergency room since your last visit here? No   Have you been hospitalized since your last visit here?  No                Objective:        1. 25 foot timed walk:  Timed 25 Foot Walk: 2020 7/15/2020   Did patient wear an AFO? No No   Was assistive device used? No No   Time for 25 Foot Walk (seconds) 3.9 5.3   Time for 25 Foot Walk (seconds) 4 -       Neurologic Exam     Mental Status   Oriented to person, place, and time.   Follows 3 step commands.   Speech: speech is normal   Level of consciousness: alert  Normal comprehension.     Cranial Nerves     CN II   Visual acuity: normal    CN III, IV, VI   Extraocular motions are normal.     CN V   Facial sensation intact.     CN VII   Facial expression full, symmetric.     CN VIII   Hearing: intact (to finger rub)    CN IX, X   Palate: symmetric    CN XI   CN XI normal.     CN XII   Tongue deviation: none    Motor Exam   Muscle bulk: normal  Right arm tone: normal  Left arm tone: normal  Right leg tone: increased  Left leg tone: increased    Strength   Right deltoid: 5/5  Left deltoid: 5/5  Right triceps: 5/5  Left triceps: 5/5  Right wrist extension: 5/5  Left wrist extension: 5/5  Right interossei: 5/5  Left interossei: 5/5  Right iliopsoas: 5/5  Left iliopsoas: 5/5  Right quadriceps: 5/5  Left quadriceps: 5/5  Right hamstrin/5  Left hamstrin/5  Right anterior tibial: 5/5  Left anterior tibial: 5/5  Right peroneal: 5/5  Left peroneal: 5/5    Sensory Exam   Right arm light touch: normal  Left arm light touch: normal  Right leg light touch: normal  Left leg light touch: diminished below knee.  Right arm vibration: normal  Left arm vibration: normal  Right leg vibration: decreased from toes  Left leg vibration: decreased from  toes    Gait, Coordination, and Reflexes     Gait  Gait: spastic    Coordination   Tandem walking coordination: normal    Reflexes   Right brachioradialis: 3+  Left brachioradialis: 3+  Right biceps: 3+  Left biceps: 3+  Right triceps: 3+  Left triceps: 3+  Right patellar: 3+  Left patellar: 3+  Right achilles: 3+  Left achilles: 3+  Right plantar: normal  Left plantar: normal  Right ankle clonus: present (1-2 beats)  Able to heel/toe walk, able to stand on each foot independently         Imaging:       Results for orders placed during the hospital encounter of 06/10/20   MRI Brain Demyelinating W W/O Contrast    Impression Examination mildly degraded by patient motion artifact.    2 solidly enhancing lesions in the supratentorial white matter as above.  Two additional subcentimeter nonenhancing lesions elsewhere.  Although not entirely specific, findings concerning for demyelinating disease (such as multiple sclerosis) with areas of active demyelination.  Lymphoma or other process not entirely excluded.  Clinical correlation required with follow-up suggested    Cervical spine appears within normal limits.  No cervical cord lesions identified.    This report was flagged in Epic as abnormal.      Electronically signed by: Levon Sierra MD  Date:    06/11/2020  Time:    10:13     Results for orders placed during the hospital encounter of 06/10/20   MRI Cervical Spine Demyelinating W W/O Contrast    Impression Examination mildly degraded by patient motion artifact.    2 solidly enhancing lesions in the supratentorial white matter as above.  Two additional subcentimeter nonenhancing lesions elsewhere.  Although not entirely specific, findings concerning for demyelinating disease (such as multiple sclerosis) with areas of active demyelination.  Lymphoma or other process not entirely excluded.  Clinical correlation required with follow-up suggested    Cervical spine appears within normal limits.  No cervical cord lesions  identified.    This report was flagged in Epic as abnormal.      Electronically signed by: Levon Sierra MD  Date:    06/11/2020  Time:    10:13     Results for orders placed during the hospital encounter of 06/10/20   MRI Thoracic Spine Demyelinating W W/O Contrast    Impression 1. Essentially unremarkable MRI of the thoracic spine with and without contrast.  No cord lesions identified.      Electronically signed by: Corey Torrez DO  Date:    06/13/2020  Time:    10:12         Labs:     Lab Results   Component Value Date    KLQMCFKZ00GC 18 (L) 06/11/2020     Lab Results   Component Value Date    JCVINDEX 0.66 (H) 06/24/2020    JCVANTIBODY POSITIVE (A) 06/24/2020     Lab Results   Component Value Date    CH5BEGFK 82.3 06/24/2020    ABSOLUTECD3 2529.0 (H) 06/24/2020    EQ6QGHXK 15.6 06/24/2020    ABSOLUTECD8 481.0 06/24/2020    RT0PSITR 66.1 (H) 06/24/2020    ABSOLUTECD4 2031.0 (H) 06/24/2020    LABCD48 4.22 (H) 06/24/2020     Lab Results   Component Value Date    WBC 10.63 06/12/2020    RBC 4.54 06/12/2020    HGB 13.7 06/12/2020    HCT 41.5 06/12/2020    MCV 91 06/12/2020    MCH 30.2 06/12/2020    MCHC 33.0 06/12/2020    RDW 12.8 06/12/2020     06/12/2020    MPV 11.3 06/12/2020    GRAN 6.5 06/12/2020    GRAN 61.4 06/12/2020    LYMPH 3.3 06/12/2020    LYMPH 31.4 06/12/2020    MONO 0.6 06/12/2020    MONO 5.2 06/12/2020    EOS 0.1 06/12/2020    BASO 0.03 06/12/2020    EOSINOPHIL 1.1 06/12/2020    BASOPHIL 0.3 06/12/2020     Sodium   Date Value Ref Range Status   06/12/2020 138 136 - 145 mmol/L Final     Potassium   Date Value Ref Range Status   06/12/2020 4.2 3.5 - 5.1 mmol/L Final     Chloride   Date Value Ref Range Status   06/12/2020 107 95 - 110 mmol/L Final     CO2   Date Value Ref Range Status   06/12/2020 21 (L) 23 - 29 mmol/L Final     Glucose   Date Value Ref Range Status   06/12/2020 95 70 - 110 mg/dL Final     BUN, Bld   Date Value Ref Range Status   06/12/2020 9 6 - 20 mg/dL Final     Creatinine    Date Value Ref Range Status   06/12/2020 0.7 0.5 - 1.4 mg/dL Final     Calcium   Date Value Ref Range Status   06/12/2020 9.3 8.7 - 10.5 mg/dL Final     Total Protein   Date Value Ref Range Status   06/12/2020 6.7 6.0 - 8.4 g/dL Final     Albumin   Date Value Ref Range Status   06/12/2020 3.5 3.5 - 5.2 g/dL Final     Total Bilirubin   Date Value Ref Range Status   06/12/2020 0.3 0.1 - 1.0 mg/dL Final     Comment:     For infants and newborns, interpretation of results should be based  on gestational age, weight and in agreement with clinical  observations.  Premature Infant recommended reference ranges:  Up to 24 hours.............<8.0 mg/dL  Up to 48 hours............<12.0 mg/dL  3-5 days..................<15.0 mg/dL  6-29 days.................<15.0 mg/dL       Alkaline Phosphatase   Date Value Ref Range Status   06/12/2020 85 55 - 135 U/L Final     AST   Date Value Ref Range Status   06/12/2020 17 10 - 40 U/L Final     ALT   Date Value Ref Range Status   06/12/2020 23 10 - 44 U/L Final     Anion Gap   Date Value Ref Range Status   06/12/2020 10 8 - 16 mmol/L Final     eGFR if    Date Value Ref Range Status   06/12/2020 >60 >60 mL/min/1.73 m^2 Final     eGFR if non    Date Value Ref Range Status   06/12/2020 >60 >60 mL/min/1.73 m^2 Final     Comment:     Calculation used to obtain the estimated glomerular filtration  rate (eGFR) is the CKD-EPI equation.        No results found for: HEPBSAG, HEPBSAB, HEPBCAB        MS Impression and Plan:     NEURO MULTIPLE SCLEROSIS IMPRESSION:   MS Status:     Number of relapses in the past year?:  1    MRI Progression comment:  Will plan to do MRI 3 months after start of Glatopa instead of 6 to assess for radiographic changes  Plan:     DMT:  No change in management    DMT comment:  Will continue Glatopa for now, but will have quick follow up in 2-3 weeks with . Ultimately, we may need to change DMT.     Symptom Management:  No change in  symptom management (will consider baclofen at next visit )     Next Imaging Due: 9/16/2020     Apart from slower timed walk patients neurological exam was stable.   [place] paperwork to be [place]. Patient provided with a note to return to work on light duty until next clinic visit.      Our visit today lasted 40 minutes, and 100% of this time was spent face to face with the patient. Over 50% of this visit included discussion of the treatment plan/medication changes/symptom management/exam findings/imaging results/coordination of care. The patient agrees with the plan of care.    Problem List Items Addressed This Visit        Neuro    Multiple sclerosis - Primary      Other Visit Diagnoses     Counseling regarding goals of care        Spasticity        Sensory disturbance        Vitamin D insufficiency              Follow up in about 3 weeks (around 8/5/2020).  Patient agreed to POC today.    Attending, Dr. Oro, was available during today's encounter.     BINA VinsonC  [facility]

## 2020-07-15 NOTE — Clinical Note
Please help with letter for her work--from last Friday through today. (she got steroids on Sat-Mon)--can return tomorrow but would like to do light duty(seated) work until our follow up in 2-3 weeks.  Pool is making appt.   Happy to discuss!!

## 2020-07-20 ENCOUNTER — TELEPHONE (OUTPATIENT)
Dept: PHARMACY | Facility: CLINIC | Age: 35
End: 2020-07-20

## 2020-07-20 ENCOUNTER — TELEPHONE (OUTPATIENT)
Dept: PSYCHIATRY | Facility: CLINIC | Age: 35
End: 2020-07-20

## 2020-07-20 NOTE — TELEPHONE ENCOUNTER
Faxed updated FMLA to [employer] and emailed updated copy to pt. Scanned to supervisor, Nuha Velazquez for upload into the chart.

## 2020-07-20 NOTE — TELEPHONE ENCOUNTER
Received return call from pt. Pt advised that she had been out from work off and on since 07/09, and that she would prefer to receive an email copy of the FMLA forms. This writer advised pt that these tasks would be complete by Wednesday, pending provider approval.

## 2020-07-20 NOTE — TELEPHONE ENCOUNTER
Faxed signed FMLA forms to [employer] Sierra, HR staff at pt's work @ [phone].     Emailed scanned version of FMLA and work excuse letter to pt.

## 2020-08-03 ENCOUNTER — TELEPHONE (OUTPATIENT)
Dept: SMOKING CESSATION | Facility: CLINIC | Age: 35
End: 2020-08-03

## 2020-08-04 ENCOUNTER — OFFICE VISIT (OUTPATIENT)
Dept: NEUROLOGY | Facility: CLINIC | Age: 35
End: 2020-08-04
Payer: COMMERCIAL

## 2020-08-04 VITALS
HEART RATE: 81 BPM | WEIGHT: 192.25 LBS | HEIGHT: 64 IN | BODY MASS INDEX: 32.82 KG/M2 | DIASTOLIC BLOOD PRESSURE: 86 MMHG | SYSTOLIC BLOOD PRESSURE: 117 MMHG

## 2020-08-04 DIAGNOSIS — G35 MULTIPLE SCLEROSIS: ICD-10-CM

## 2020-08-04 DIAGNOSIS — M79.2 NEUROPATHIC PAIN: Primary | ICD-10-CM

## 2020-08-04 PROCEDURE — 3008F PR BODY MASS INDEX (BMI) DOCUMENTED: ICD-10-PCS | Mod: CPTII,S$GLB,, | Performed by: PSYCHIATRY & NEUROLOGY

## 2020-08-04 PROCEDURE — 99999 PR PBB SHADOW E&M-EST. PATIENT-LVL IV: ICD-10-PCS | Mod: PBBFAC,,, | Performed by: PSYCHIATRY & NEUROLOGY

## 2020-08-04 PROCEDURE — 99215 OFFICE O/P EST HI 40 MIN: CPT | Mod: S$GLB,,, | Performed by: PSYCHIATRY & NEUROLOGY

## 2020-08-04 PROCEDURE — 99999 PR PBB SHADOW E&M-EST. PATIENT-LVL IV: CPT | Mod: PBBFAC,,, | Performed by: PSYCHIATRY & NEUROLOGY

## 2020-08-04 PROCEDURE — 99215 PR OFFICE/OUTPT VISIT, EST, LEVL V, 40-54 MIN: ICD-10-PCS | Mod: S$GLB,,, | Performed by: PSYCHIATRY & NEUROLOGY

## 2020-08-04 PROCEDURE — 3008F BODY MASS INDEX DOCD: CPT | Mod: CPTII,S$GLB,, | Performed by: PSYCHIATRY & NEUROLOGY

## 2020-08-04 RX ORDER — GABAPENTIN 300 MG/1
300 CAPSULE ORAL 3 TIMES DAILY
Qty: 90 CAPSULE | Refills: 2 | Status: SHIPPED | OUTPATIENT
Start: 2020-08-04 | End: 2020-10-30

## 2020-08-04 NOTE — LETTER
Cari Barillas had a medical procedure on ___________. Please excuse her from work for this time. She may return to work on _____________ with previously ordered restrictions in place until follow up.    If you have any questions or concerns, please don't hesitate to call.      Aida Oro MD  [facility]

## 2020-08-04 NOTE — PATIENT INSTRUCTIONS
Gabapentin:  Take 300mg at night for 1 week. Then 300mg twice a day for one week. Then 300mg three times a day afterwards.

## 2020-08-04 NOTE — PROGRESS NOTES
"Subjective:          Patient ID: Cari Barillas is a 34 y.o. female who presents today for a fit-in clinic visit for MS.      MS HPI:  · DMT: glatiramer acetate  · Side effects from DMT? No, more frequent menstrual cycles  · Taking vitamin D3 as recommended? Yes -  Dose:   · Having near falls at work, unclear trigger  · "Tired leg", touch sensation is off. Feels like she spilled cold water on leg or sitting in cold water, having altered sensation like being burned  ·     Medications:  Current Outpatient Medications   Medication Sig    citalopram hydrobromide (CITALOPRAM ORAL) Take 40 mg by mouth once daily.     cyanocobalamin (VITAMIN B-12) 1000 MCG tablet Take 1 tablet (1,000 mcg total) by mouth once daily.    glatiramer (COPAXONE) 40 mg/mL injection Inject 40 mg into the skin 3 (three) times a week.    LORazepam (ATIVAN) 0.5 MG tablet Take 1 tablet (0.5 mg total) by mouth every 8 (eight) hours as needed for Anxiety.    methocarbamoL (ROBAXIN) 500 MG Tab Take 500 mg by mouth as needed for Migraine.    ergocalciferol (ERGOCALCIFEROL) 50,000 unit Cap Take 1 capsule (50,000 Units total) by mouth every 7 days. (Patient not taking: Reported on 8/4/2020)    nicotine (NICODERM CQ) 21 mg/24 hr Place 1 patch onto the skin once daily. (Patient not taking: Reported on 8/4/2020)     No current facility-administered medications for this visit.        SOCIAL HISTORY  Social History     Tobacco Use    Smoking status: Current Every Day Smoker     Packs/day: 0.50   Substance Use Topics    Alcohol use: Yes     Frequency: Never     Comment: social    Drug use: Never       ROS:    REVIEW OF SYMPTOMS 7/13/2020   Do you feel abnormally tired on most days? Yes   Do you feel you generally sleep well? Yes   Do you have difficulty controlling your bladder?  No   Do you have difficulty controlling your bowels?  No   Do you have frequent muscle cramps, tightness or spasms in your limbs?  Yes   Do you have new visual symptoms?  No "   Do you have worsening difficulty with your memory or thinking? No   Do you have worsening symptoms of anxiety or depression?  No   For patients who walk, Do you have more difficulty walking?  No   Have you fallen since your last visit?  No   For patients who use wheelchairs: Do you have any skin wounds or breakdown? No   Do you have difficulty using your hands?  No   Do you have shooting or burning pain? Yes   Do you have difficulty with sexual function?  No   If you are sexually active, are you using birth control? Y/N  N/A No   Do you often choke when swallowing liquids or solid food?  No   Do you experience worsening symptoms when overheated? Yes   Do you need any new equipment such as a wheelchair, walker or shower chair? No   Do you receive co-pay financial assistance for your principal MS medicine? Yes   Would you be interested in participating in an MS research trial in the future? Yes   For patients on Gilenya, Tecfidera, Aubagio, Rituxan, Ocrevus, Tysabri, Lemtrada or Methotrexate, are you aware that you should NOT receive live virus vaccines?  No   Do you feel you have adequate family/friend support?  Yes   Do you have health insurance?   Yes   Are you currently employed? Yes   Do you receive SSDI/SSI?  Not Applicable   Do you use marijuana or cannabis products? No   Have you been diagnosed with a urinary tract infection since your last visit here? No   Have you been diagnosed with a respiratory tract infection since your last visit here? No   Have you been to the emergency room since your last visit here? No   Have you been hospitalized since your last visit here?  No                Objective:        1. 25 foot timed walk:  Timed 25 Foot Walk: 7/15/2020 8/4/2020   Did patient wear an AFO? No No   Was assistive device used? No No   Time for 25 Foot Walk (seconds) 5.3 4.35   Time for 25 Foot Walk (seconds) - 4.06       2. 9 Hole Peg Test:  No flowsheet data found.    Neurologic Exam  MENTAL STATUS:  grossly intact. Normal language, attention, and memory.   CRANIAL NERVE EXAM: Extraocular muscles are intact.  No facial asymmetry. tongue midline. Shoulder shrug normal b/l There is no dysarthria.   MOTOR EXAM: Normal bulk and tone throughout UE and LE bilaterally. Strength is 5/5 in all groups in the lower extremities and upper extremities. Unable to stand/squat on left leg  SENSORY EXAM: Normal to LT in UE's, decreased to left flank and left leg. Reports dysesthesias to left leg when touched  COORDINATION: Normal finger-to-nose exam.   GAIT: Narrow based and stable. Able to toe and tandem walk, difficulty with heel walk on left.      Imaging:     No results found for this or any previous visit.  No results found for this or any previous visit.  No results found for this or any previous visit.  Results for orders placed during the hospital encounter of 06/10/20   MRI Brain Demyelinating W W/O Contrast    Impression Examination mildly degraded by patient motion artifact.    2 solidly enhancing lesions in the supratentorial white matter as above.  Two additional subcentimeter nonenhancing lesions elsewhere.  Although not entirely specific, findings concerning for demyelinating disease (such as multiple sclerosis) with areas of active demyelination.  Lymphoma or other process not entirely excluded.  Clinical correlation required with follow-up suggested    Cervical spine appears within normal limits.  No cervical cord lesions identified.    This report was flagged in Epic as abnormal.      Electronically signed by: Levon Sierra MD  Date:    06/11/2020  Time:    10:13     Results for orders placed during the hospital encounter of 06/10/20   MRI Cervical Spine Demyelinating W W/O Contrast    Impression Examination mildly degraded by patient motion artifact.    2 solidly enhancing lesions in the supratentorial white matter as above.  Two additional subcentimeter nonenhancing lesions elsewhere.  Although not entirely  specific, findings concerning for demyelinating disease (such as multiple sclerosis) with areas of active demyelination.  Lymphoma or other process not entirely excluded.  Clinical correlation required with follow-up suggested    Cervical spine appears within normal limits.  No cervical cord lesions identified.    This report was flagged in Epic as abnormal.      Electronically signed by: Levon Sierra MD  Date:    06/11/2020  Time:    10:13     Results for orders placed during the hospital encounter of 06/10/20   MRI Thoracic Spine Demyelinating W W/O Contrast    Impression 1. Essentially unremarkable MRI of the thoracic spine with and without contrast.  No cord lesions identified.      Electronically signed by: Corey Torrez DO  Date:    06/13/2020  Time:    10:12         Labs:     Lab Results   Component Value Date    RPSFIZPJ07AM 18 (L) 06/11/2020     Lab Results   Component Value Date    JCVINDEX 0.66 (H) 06/24/2020    JCVANTIBODY POSITIVE (A) 06/24/2020     Lab Results   Component Value Date    PZ4EJBMW 82.3 06/24/2020    ABSOLUTECD3 2529.0 (H) 06/24/2020    HB6MBUSL 15.6 06/24/2020    ABSOLUTECD8 481.0 06/24/2020    FK7FWJQL 66.1 (H) 06/24/2020    ABSOLUTECD4 2031.0 (H) 06/24/2020    LABCD48 4.22 (H) 06/24/2020     Lab Results   Component Value Date    WBC 10.63 06/12/2020    RBC 4.54 06/12/2020    HGB 13.7 06/12/2020    HCT 41.5 06/12/2020    MCV 91 06/12/2020    MCH 30.2 06/12/2020    MCHC 33.0 06/12/2020    RDW 12.8 06/12/2020     06/12/2020    MPV 11.3 06/12/2020    GRAN 6.5 06/12/2020    GRAN 61.4 06/12/2020    LYMPH 3.3 06/12/2020    LYMPH 31.4 06/12/2020    MONO 0.6 06/12/2020    MONO 5.2 06/12/2020    EOS 0.1 06/12/2020    BASO 0.03 06/12/2020    EOSINOPHIL 1.1 06/12/2020    BASOPHIL 0.3 06/12/2020     Sodium   Date Value Ref Range Status   06/12/2020 138 136 - 145 mmol/L Final     Potassium   Date Value Ref Range Status   06/12/2020 4.2 3.5 - 5.1 mmol/L Final     Chloride   Date Value Ref  Range Status   06/12/2020 107 95 - 110 mmol/L Final     CO2   Date Value Ref Range Status   06/12/2020 21 (L) 23 - 29 mmol/L Final     Glucose   Date Value Ref Range Status   06/12/2020 95 70 - 110 mg/dL Final     BUN, Bld   Date Value Ref Range Status   06/12/2020 9 6 - 20 mg/dL Final     Creatinine   Date Value Ref Range Status   06/12/2020 0.7 0.5 - 1.4 mg/dL Final     Calcium   Date Value Ref Range Status   06/12/2020 9.3 8.7 - 10.5 mg/dL Final     Total Protein   Date Value Ref Range Status   06/12/2020 6.7 6.0 - 8.4 g/dL Final     Albumin   Date Value Ref Range Status   06/12/2020 3.5 3.5 - 5.2 g/dL Final     Total Bilirubin   Date Value Ref Range Status   06/12/2020 0.3 0.1 - 1.0 mg/dL Final     Comment:     For infants and newborns, interpretation of results should be based  on gestational age, weight and in agreement with clinical  observations.  Premature Infant recommended reference ranges:  Up to 24 hours.............<8.0 mg/dL  Up to 48 hours............<12.0 mg/dL  3-5 days..................<15.0 mg/dL  6-29 days.................<15.0 mg/dL       Alkaline Phosphatase   Date Value Ref Range Status   06/12/2020 85 55 - 135 U/L Final     AST   Date Value Ref Range Status   06/12/2020 17 10 - 40 U/L Final     ALT   Date Value Ref Range Status   06/12/2020 23 10 - 44 U/L Final     Anion Gap   Date Value Ref Range Status   06/12/2020 10 8 - 16 mmol/L Final     eGFR if    Date Value Ref Range Status   06/12/2020 >60 >60 mL/min/1.73 m^2 Final     eGFR if non    Date Value Ref Range Status   06/12/2020 >60 >60 mL/min/1.73 m^2 Final     Comment:     Calculation used to obtain the estimated glomerular filtration  rate (eGFR) is the CKD-EPI equation.        No results found for: HEPBSAG, HEPBSAB, HEPBCAB        MS Impression and Plan:     NEURO MULTIPLE SCLEROSIS IMPRESSION:   MS Status:     Number of relapses in the past year?:  1    Clinical Progression:  Worsened    MRI  Progression:  N/A  Plan:     DMT:  No change in management    DMT comment:  Continue Copaxone for now. However, will eval for relapse with MRI brain/spine as her symptoms do not correlate to current known lesions on MRI. Suspect further relapse.    Symptom Management:  Implement change in symptom management    Implement Change in Symptom Management:  Pain and Gait (Increase gabapentin to 300mg tid for neuropathic pain and PT/OT ordered for gait imbalance and weakness.)    F/u in 2-3 weeks with me.      Our visit today lasted 40 minutes, and 100% of this time was spent face to face with the patient. Over 50% of this visit included discussion of the treatment plan/medication changes/symptom management/exam findings/imaging results/coordination of care. The patient agrees with the plan of care.    Problem List Items Addressed This Visit     None          Aida Oro MD

## 2020-08-04 NOTE — LETTER
August 4, 2020    Cari Barillas  [address] [facility] [address]         [facility]  [address]  [place] [address]  Phone: [phone] August 4, 2020     Patient: Cari Barillas   YOB: 1985   Date of Visit: 8/4/2020       To Whom It May Concern:    Ms. Barillas is following with me for a medical condition that impairs her ability to work for prolonged periods without rest. It is my medical opinion that Cari Barillas should reduce work hours to 6 hours per day until _______________, and she will need a 15min break every 2-3 hours to prevent overexertion or worsening of symptoms.    If you have any questions or concerns, please don't hesitate to call.    Sincerely,        Aida Oro MD

## 2020-08-05 ENCOUNTER — HOSPITAL ENCOUNTER (OUTPATIENT)
Dept: RADIOLOGY | Facility: HOSPITAL | Age: 35
Discharge: HOME | End: 2020-08-05
Attending: PSYCHIATRY & NEUROLOGY
Payer: COMMERCIAL

## 2020-08-05 DIAGNOSIS — G35 MULTIPLE SCLEROSIS: ICD-10-CM

## 2020-08-05 PROCEDURE — 72157 MRI CHEST SPINE W/O & W/DYE: CPT | Mod: TC

## 2020-08-05 PROCEDURE — 25500020 PHARM REV CODE 255: Performed by: PSYCHIATRY & NEUROLOGY

## 2020-08-05 PROCEDURE — 70553 MRI BRAIN STEM W/O & W/DYE: CPT | Mod: TC

## 2020-08-05 PROCEDURE — 70553 MRI BRAIN STEM W/O & W/DYE: CPT | Mod: 26,,, | Performed by: RADIOLOGY

## 2020-08-05 PROCEDURE — 70553 MRI BRAIN DEMYELINATING W/ WO CONTRAST: ICD-10-PCS | Mod: 26,,, | Performed by: RADIOLOGY

## 2020-08-05 PROCEDURE — 72157 MRI CHEST SPINE W/O & W/DYE: CPT | Mod: 26,,, | Performed by: RADIOLOGY

## 2020-08-05 PROCEDURE — 72157 MRI THORACIC SPINE DEMYELINATING W W/O CONTRAST: ICD-10-PCS | Mod: 26,,, | Performed by: RADIOLOGY

## 2020-08-05 PROCEDURE — A9585 GADOBUTROL INJECTION: HCPCS | Performed by: PSYCHIATRY & NEUROLOGY

## 2020-08-05 RX ORDER — GADOBUTROL 604.72 MG/ML
10 INJECTION INTRAVENOUS
Status: COMPLETED | OUTPATIENT
Start: 2020-08-05 | End: 2020-08-05

## 2020-08-05 RX ADMIN — GADOBUTROL 10 ML: 604.72 INJECTION INTRAVENOUS at 08:08

## 2020-08-06 ENCOUNTER — PATIENT MESSAGE (OUTPATIENT)
Dept: NEUROLOGY | Facility: CLINIC | Age: 35
End: 2020-08-06

## 2020-08-06 NOTE — TELEPHONE ENCOUNTER
Called and spoke to patient concerning her MRI results and briefly discussion next steps of care. Will plan to see her in clinic on tomorrow at 9:20am

## 2020-08-07 ENCOUNTER — OFFICE VISIT (OUTPATIENT)
Dept: NEUROLOGY | Facility: CLINIC | Age: 35
End: 2020-08-07
Payer: COMMERCIAL

## 2020-08-07 VITALS
HEIGHT: 64 IN | BODY MASS INDEX: 32.82 KG/M2 | WEIGHT: 192.25 LBS | DIASTOLIC BLOOD PRESSURE: 82 MMHG | HEART RATE: 82 BPM | SYSTOLIC BLOOD PRESSURE: 117 MMHG

## 2020-08-07 DIAGNOSIS — K29.00 OTHER ACUTE GASTRITIS, PRESENCE OF BLEEDING UNSPECIFIED: ICD-10-CM

## 2020-08-07 DIAGNOSIS — Z00.00 ADULT GENERAL MEDICAL EXAM: ICD-10-CM

## 2020-08-07 DIAGNOSIS — F41.9 ANXIETY: ICD-10-CM

## 2020-08-07 DIAGNOSIS — G35 MULTIPLE SCLEROSIS EXACERBATION: ICD-10-CM

## 2020-08-07 DIAGNOSIS — G35 MS (MULTIPLE SCLEROSIS): ICD-10-CM

## 2020-08-07 DIAGNOSIS — G35 MULTIPLE SCLEROSIS: Primary | ICD-10-CM

## 2020-08-07 PROCEDURE — 99215 OFFICE O/P EST HI 40 MIN: CPT | Mod: S$GLB,,, | Performed by: PSYCHIATRY & NEUROLOGY

## 2020-08-07 PROCEDURE — 99354 PR PROLONGED SVC, OUPT, 1ST HR: CPT | Mod: S$GLB,,, | Performed by: PSYCHIATRY & NEUROLOGY

## 2020-08-07 PROCEDURE — 99215 PR OFFICE/OUTPT VISIT, EST, LEVL V, 40-54 MIN: ICD-10-PCS | Mod: S$GLB,,, | Performed by: PSYCHIATRY & NEUROLOGY

## 2020-08-07 PROCEDURE — 99354 PR PROLONGED SVC, OUPT, 1ST HR: ICD-10-PCS | Mod: S$GLB,,, | Performed by: PSYCHIATRY & NEUROLOGY

## 2020-08-07 PROCEDURE — 99999 PR PBB SHADOW E&M-EST. PATIENT-LVL IV: ICD-10-PCS | Mod: PBBFAC,,, | Performed by: PSYCHIATRY & NEUROLOGY

## 2020-08-07 PROCEDURE — 99999 PR PBB SHADOW E&M-EST. PATIENT-LVL IV: CPT | Mod: PBBFAC,,, | Performed by: PSYCHIATRY & NEUROLOGY

## 2020-08-07 RX ORDER — METHYLPREDNISOLONE SODIUM SUCCINATE 1 G/16ML
1000 INJECTION INTRAMUSCULAR; INTRAVENOUS DAILY
Qty: 3 VIAL | Refills: 0 | Status: SHIPPED | OUTPATIENT
Start: 2020-08-07 | End: 2020-08-10

## 2020-08-07 RX ORDER — LORAZEPAM 0.5 MG/1
0.5 TABLET ORAL EVERY 8 HOURS PRN
Qty: 10 TABLET | Refills: 0 | Status: SHIPPED | OUTPATIENT
Start: 2020-08-07 | End: 2020-09-25 | Stop reason: SDUPTHER

## 2020-08-07 NOTE — LETTER
August 7, 2020    Cari Barillas  [address] [facility] [address]         [facility]  [address]  [place] [address]  Phone: [phone] August 7, 2020     Patient: Cari Barillas   YOB: 1985   Date of Visit: 8/7/2020       To Whom It May Concern:    It is my medical opinion that Cari Barillas may return to work on 8/10/20 with previous restrictions in place.    If you have any questions or concerns, please don't hesitate to call.    Sincerely,        Aida Oro MD

## 2020-08-07 NOTE — PROGRESS NOTES
Subjective:          Patient ID: Cari Barillas is a 34 y.o. female who presents today for a fit-in clinic visit for MS.      MS HPI:  · DMT: glatiramer acetate  · Side effects from DMT? No  · Taking vitamin D3 as recommended? Yes - [id number]  · Discussed MRI results and next steps of care. New lesions on MRI, concern for more aggressive disease.  · Discussed switching to Ocrevus at this time. Patient in agreement. Consent paperwork filled out with patient.  · Increase in gabapentin helped with symptom management.    Medications:  Current Outpatient Medications   Medication Sig    citalopram hydrobromide (CITALOPRAM ORAL) Take 40 mg by mouth once daily.     cyanocobalamin (VITAMIN B-12) 1000 MCG tablet Take 1 tablet (1,000 mcg total) by mouth once daily.    ergocalciferol (ERGOCALCIFEROL) 50,000 unit Cap Take 1 capsule (50,000 Units total) by mouth every 7 days. (Patient not taking: Reported on 8/4/2020)    gabapentin (NEURONTIN) 300 MG capsule Take 1 capsule (300 mg total) by mouth 3 (three) times daily.    glatiramer (COPAXONE) 40 mg/mL injection Inject 40 mg into the skin 3 (three) times a week.    LORazepam (ATIVAN) 0.5 MG tablet Take 1 tablet (0.5 mg total) by mouth every 8 (eight) hours as needed for Anxiety.    methocarbamoL (ROBAXIN) 500 MG Tab Take 500 mg by mouth as needed for Migraine.    nicotine (NICODERM CQ) 21 mg/24 hr Place 1 patch onto the skin once daily. (Patient not taking: Reported on 8/4/2020)     No current facility-administered medications for this visit.        SOCIAL HISTORY  Social History     Tobacco Use    Smoking status: Current Every Day Smoker     Packs/day: 0.50   Substance Use Topics    Alcohol use: Yes     Frequency: Never     Comment: social    Drug use: Never           ROS:    REVIEW OF SYMPTOMS 7/13/2020   Do you feel abnormally tired on most days? Yes   Do you feel you generally sleep well? Yes   Do you have difficulty controlling your bladder?  No   Do you  have difficulty controlling your bowels?  No   Do you have frequent muscle cramps, tightness or spasms in your limbs?  Yes   Do you have new visual symptoms?  No   Do you have worsening difficulty with your memory or thinking? No   Do you have worsening symptoms of anxiety or depression?  No   For patients who walk, Do you have more difficulty walking?  No   Have you fallen since your last visit?  No   For patients who use wheelchairs: Do you have any skin wounds or breakdown? No   Do you have difficulty using your hands?  No   Do you have shooting or burning pain? Yes   Do you have difficulty with sexual function?  No   If you are sexually active, are you using birth control? Y/N  N/A No   Do you often choke when swallowing liquids or solid food?  No   Do you experience worsening symptoms when overheated? Yes   Do you need any new equipment such as a wheelchair, walker or shower chair? No   Do you receive co-pay financial assistance for your principal MS medicine? Yes   Would you be interested in participating in an MS research trial in the future? Yes   For patients on Gilenya, Tecfidera, Aubagio, Rituxan, Ocrevus, Tysabri, Lemtrada or Methotrexate, are you aware that you should NOT receive live virus vaccines?  No   Do you feel you have adequate family/friend support?  Yes   Do you have health insurance?   Yes   Are you currently employed? Yes   Do you receive SSDI/SSI?  Not Applicable   Do you use marijuana or cannabis products? No   Have you been diagnosed with a urinary tract infection since your last visit here? No   Have you been diagnosed with a respiratory tract infection since your last visit here? No   Have you been to the emergency room since your last visit here? No   Have you been hospitalized since your last visit here?  No                Objective:        1. 25 foot timed walk:  Timed 25 Foot Walk: 7/15/2020 8/4/2020   Did patient wear an AFO? No No   Was assistive device used? No No   Time for 25  Foot Walk (seconds) 5.3 4.35   Time for 25 Foot Walk (seconds) - 4.06       2. 9 Hole Peg Test:  No flowsheet data found.    Neurologic Exam   MENTAL STATUS: grossly intact. Normal language, attention, and memory.   CRANIAL NERVE EXAM: Extraocular muscles are intact.  No facial asymmetry. tongue midline. Shoulder shrug normal b/l There is no dysarthria.   MOTOR EXAM: Normal bulk and tone throughout UE and LE bilaterally. Rapid sequential movements are normal. Strength is 5/5 in all groups in the lower extremities and upper extremities except Left deltoid and HF weakness 5-.   SENSORY EXAM: Normal LT t/o except Left leg paresthesias  COORDINATION: Normal finger-to-nose exam.   GAIT: Narrow based and stable.      Imaging:     No results found for this or any previous visit.  No results found for this or any previous visit.  No results found for this or any previous visit.  Results for orders placed during the hospital encounter of 08/05/20   MRI Brain Demyelinating W W/O Contrast    Impression Scattered small T2/FLAIR hyperintensities within the supratentorial white matter compatible with given history of multiple sclerosis.  New enhancing subcentimeter lesion within the body of the right corpus callosum compatible with active demyelination.  Additional details above.    Partially visualized right lateral T2/STIR hyperintensity at C6-C7 which demonstrates some postcontrast enhancement.  Lesion new from recent MRI cervical spine and concerning for active demyelination.    This report was flagged in Epic as abnormal.    Electronically signed by resident: Josemanuel Graf  Date:    08/06/2020  Time:    09:01    Electronically signed by: Marty De Oliveira MD  Date:    08/06/2020  Time:    09:31     Results for orders placed during the hospital encounter of 06/10/20   MRI Cervical Spine Demyelinating W W/O Contrast    Impression Examination mildly degraded by patient motion artifact.    2 solidly enhancing lesions in the  supratentorial white matter as above.  Two additional subcentimeter nonenhancing lesions elsewhere.  Although not entirely specific, findings concerning for demyelinating disease (such as multiple sclerosis) with areas of active demyelination.  Lymphoma or other process not entirely excluded.  Clinical correlation required with follow-up suggested    Cervical spine appears within normal limits.  No cervical cord lesions identified.    This report was flagged in Epic as abnormal.      Electronically signed by: Levon Sierra MD  Date:    06/11/2020  Time:    10:13     Results for orders placed during the hospital encounter of 08/05/20   MRI Thoracic Spine Demyelinating W W/O Contrast    Impression Scattered small T2/FLAIR hyperintensities within the supratentorial white matter compatible with given history of multiple sclerosis.  New enhancing subcentimeter lesion within the body of the right corpus callosum compatible with active demyelination.  Additional details above.    Partially visualized right lateral T2/STIR hyperintensity at C6-C7 which demonstrates some postcontrast enhancement.  Lesion new from recent MRI cervical spine and concerning for active demyelination.    This report was flagged in Epic as abnormal.    Electronically signed by resident: Josemanuel Graf  Date:    08/06/2020  Time:    09:01    Electronically signed by: Marty De Oliveira MD  Date:    08/06/2020  Time:    09:31         Labs:     Lab Results   Component Value Date    [id number] 18 (L) 06/11/2020     Lab Results   Component Value Date    JCVINDEX 0.66 (H) 06/24/2020    JCVANTIBODY POSITIVE (A) 06/24/2020     Lab Results   Component Value Date    GF2HPNGP 82.3 06/24/2020    ABSOLUTECD3 2529.0 (H) 06/24/2020    BP9XNECW 15.6 06/24/2020    ABSOLUTECD8 481.0 06/24/2020    IG2NKOCV 66.1 (H) 06/24/2020    ABSOLUTECD4 2031.0 (H) 06/24/2020    LABCD48 4.22 (H) 06/24/2020     Lab Results   Component Value Date    WBC 10.63 06/12/2020    RBC 4.54  06/12/2020    HGB 13.7 06/12/2020    HCT 41.5 06/12/2020    MCV 91 06/12/2020    MCH 30.2 06/12/2020    MCHC 33.0 06/12/2020    RDW 12.8 06/12/2020     06/12/2020    MPV 11.3 06/12/2020    GRAN 6.5 06/12/2020    GRAN 61.4 06/12/2020    LYMPH 3.3 06/12/2020    LYMPH 31.4 06/12/2020    MONO 0.6 06/12/2020    MONO 5.2 06/12/2020    EOS 0.1 06/12/2020    BASO 0.03 06/12/2020    EOSINOPHIL 1.1 06/12/2020    BASOPHIL 0.3 06/12/2020     Sodium   Date Value Ref Range Status   06/12/2020 138 136 - 145 mmol/L Final     Potassium   Date Value Ref Range Status   06/12/2020 4.2 3.5 - 5.1 mmol/L Final     Chloride   Date Value Ref Range Status   06/12/2020 107 95 - 110 mmol/L Final     CO2   Date Value Ref Range Status   06/12/2020 21 (L) 23 - 29 mmol/L Final     Glucose   Date Value Ref Range Status   06/12/2020 95 70 - 110 mg/dL Final     BUN, Bld   Date Value Ref Range Status   06/12/2020 9 6 - 20 mg/dL Final     Creatinine   Date Value Ref Range Status   06/12/2020 0.7 0.5 - 1.4 mg/dL Final     Calcium   Date Value Ref Range Status   06/12/2020 9.3 8.7 - 10.5 mg/dL Final     Total Protein   Date Value Ref Range Status   06/12/2020 6.7 6.0 - 8.4 g/dL Final     Albumin   Date Value Ref Range Status   06/12/2020 3.5 3.5 - 5.2 g/dL Final     Total Bilirubin   Date Value Ref Range Status   06/12/2020 0.3 0.1 - 1.0 mg/dL Final     Comment:     For infants and newborns, interpretation of results should be based  on gestational age, weight and in agreement with clinical  observations.  Premature Infant recommended reference ranges:  Up to 24 hours.............<8.0 mg/dL  Up to 48 hours............<12.0 mg/dL  3-5 days..................<15.0 mg/dL  6-29 days.................<15.0 mg/dL       Alkaline Phosphatase   Date Value Ref Range Status   06/12/2020 85 55 - 135 U/L Final     AST   Date Value Ref Range Status   06/12/2020 17 10 - 40 U/L Final     ALT   Date Value Ref Range Status   06/12/2020 23 10 - 44 U/L Final     Anion  Gap   Date Value Ref Range Status   06/12/2020 10 8 - 16 mmol/L Final     eGFR if    Date Value Ref Range Status   06/12/2020 >60 >60 mL/min/1.73 m^2 Final     eGFR if non    Date Value Ref Range Status   06/12/2020 >60 >60 mL/min/1.73 m^2 Final     Comment:     Calculation used to obtain the estimated glomerular filtration  rate (eGFR) is the CKD-EPI equation.        No results found for: HEPBSAG, HEPBSAB, HEPBCAB        MS Impression and Plan:     NEURO MULTIPLE SCLEROSIS IMPRESSION:   MS Status:     Number of relapses in the past year?:  2    Clinical Progression:  Worsened    MRI Progression:  Worsened (new lesions)  Plan:     DMT:  Cade treatment for relapse and Switch Disease Modifying therapy    Cade treatment for Relapse:  Other    Cade treatment for Relapse comment:  Oral solumedrol    Switch Disease Modifying Therapy FROM:  Glatiramer acetate    TO:  Ocrelizumab    Symptom Management:  No change in symptom management     Will plan to switch DMT from GA to Ocrevus in light of second relapse in less than 3 months, new lesion on spinal cord MRI correlating with worsened gait and paresthesias. It is important to change her DMT to a more aggressive one at this time to prevent further disability. Ocrevus safety labs will be ordered on today. Referral to ID will be placed to help manage vaccinations with immunosuppressant therapy.     MS relapse: will manage relapse with oral solumedrol 1000mg qday x 3 days    Lack of primary care: refer to internal medicine    Anxiety: refer to psychiatry and given short course of ativan 0.5mg prn anxiety. Likely adjustment disorder with anxiety. Will continue to monitor.    F/u with me or Farida in 2 months          Our visit today lasted 70 minutes, and 100% of this time was spent face to face with the patient. Over 50% of this visit included discussion of the treatment plan/medication changes/symptom management/exam  findings/imaging results/coordination of care. The patient agrees with the plan of care.    Problem List Items Addressed This Visit     None          Aida Oro MD

## 2020-08-10 ENCOUNTER — CLINICAL SUPPORT (OUTPATIENT)
Dept: INFECTIOUS DISEASES | Facility: CLINIC | Age: 35
End: 2020-08-10
Payer: COMMERCIAL

## 2020-08-10 ENCOUNTER — OFFICE VISIT (OUTPATIENT)
Dept: INFECTIOUS DISEASES | Facility: CLINIC | Age: 35
End: 2020-08-10
Payer: COMMERCIAL

## 2020-08-10 VITALS
WEIGHT: 202.38 LBS | HEART RATE: 67 BPM | DIASTOLIC BLOOD PRESSURE: 86 MMHG | SYSTOLIC BLOOD PRESSURE: 133 MMHG | TEMPERATURE: 98 F | BODY MASS INDEX: 34.74 KG/M2

## 2020-08-10 DIAGNOSIS — G35 MULTIPLE SCLEROSIS: ICD-10-CM

## 2020-08-10 DIAGNOSIS — D84.821 IMMUNOSUPPRESSION DUE TO DRUG THERAPY: ICD-10-CM

## 2020-08-10 DIAGNOSIS — Z71.85 VACCINE COUNSELING: Primary | ICD-10-CM

## 2020-08-10 DIAGNOSIS — Z79.899 IMMUNOSUPPRESSION DUE TO DRUG THERAPY: ICD-10-CM

## 2020-08-10 PROCEDURE — 90472 IMMUNIZATION ADMIN EACH ADD: CPT | Mod: S$GLB,,, | Performed by: INTERNAL MEDICINE

## 2020-08-10 PROCEDURE — 90471 IMMUNIZATION ADMIN: CPT | Mod: S$GLB,,, | Performed by: INTERNAL MEDICINE

## 2020-08-10 PROCEDURE — 99999 PR PBB SHADOW E&M-EST. PATIENT-LVL IV: ICD-10-PCS | Mod: PBBFAC,,, | Performed by: INTERNAL MEDICINE

## 2020-08-10 PROCEDURE — 99999 PR PBB SHADOW E&M-EST. PATIENT-LVL IV: CPT | Mod: PBBFAC,,, | Performed by: INTERNAL MEDICINE

## 2020-08-10 PROCEDURE — 3008F PR BODY MASS INDEX (BMI) DOCUMENTED: ICD-10-PCS | Mod: CPTII,S$GLB,, | Performed by: INTERNAL MEDICINE

## 2020-08-10 PROCEDURE — 90632 HEPATITIS A VACCINE ADULT IM: ICD-10-PCS | Mod: S$GLB,,, | Performed by: INTERNAL MEDICINE

## 2020-08-10 PROCEDURE — 99204 PR OFFICE/OUTPT VISIT, NEW, LEVL IV, 45-59 MIN: ICD-10-PCS | Mod: 25,S$GLB,, | Performed by: INTERNAL MEDICINE

## 2020-08-10 PROCEDURE — 90471 HEPATITIS A VACCINE ADULT IM: ICD-10-PCS | Mod: S$GLB,,, | Performed by: INTERNAL MEDICINE

## 2020-08-10 PROCEDURE — 90670 PCV13 VACCINE IM: CPT | Mod: S$GLB,,, | Performed by: INTERNAL MEDICINE

## 2020-08-10 PROCEDURE — 99204 OFFICE O/P NEW MOD 45 MIN: CPT | Mod: 25,S$GLB,, | Performed by: INTERNAL MEDICINE

## 2020-08-10 PROCEDURE — 3008F BODY MASS INDEX DOCD: CPT | Mod: CPTII,S$GLB,, | Performed by: INTERNAL MEDICINE

## 2020-08-10 PROCEDURE — 90670 PNEUMOCOCCAL CONJUGATE VACCINE 13-VALENT LESS THAN 5YO & GREATER THAN: ICD-10-PCS | Mod: S$GLB,,, | Performed by: INTERNAL MEDICINE

## 2020-08-10 PROCEDURE — 90632 HEPA VACCINE ADULT IM: CPT | Mod: S$GLB,,, | Performed by: INTERNAL MEDICINE

## 2020-08-10 PROCEDURE — 90472 PNEUMOCOCCAL CONJUGATE VACCINE 13-VALENT LESS THAN 5YO & GREATER THAN: ICD-10-PCS | Mod: S$GLB,,, | Performed by: INTERNAL MEDICINE

## 2020-08-10 NOTE — LETTER
August 10, 2020      Aida Oro MD  [address]  [facility] [address]           [facility] Infectious [facility]  [address]  [facility] [address]  Phone: [phone]  Fax: [phone]          Patient: Cari Barillas   MR Number: [id number]   YOB: 1985   Date of Visit: 8/10/2020       Dear Dr. Aida Oro:    Thank you for referring Cari Barillas to me for evaluation. Attached you will find relevant portions of my assessment and plan of care.    If you have questions, please do not hesitate to call me. I look forward to following Cari Barillas along with you.    Sincerely,    Shivani Antoine MD    Enclosure  CC:  No Recipients    If you would like to receive this communication electronically, please contact [email or web address] or [phone] to request more information on [employer] access.    For providers and/or their staff who would like to refer a patient to [facility], please contact us through our one-stop-shop provider referral line, [facility], at [phone].    If you feel you have received this communication in error or would no longer like to receive these types of communications, please e-mail [email or web address]

## 2020-08-10 NOTE — PROGRESS NOTES
Pre Biologic Response Modifier Therapy Consult  BMR Recipient Evaluation    Requesting Physician: Dr Oro    Reason for Visit: Vaccine counseling    History of Present Illness  34 y.o. female with advanced Multiple Sclerosis presents for vaccine counseling prior to starting ocrevus.      Patient denies any recent fever, chills, or infective infective illnesses.    Review of Symptoms:  Constitutional: Denies fevers, chills, or weakness.  Cardiovascular: Denies chest pain, palpitations  Respiratory: Denies shortness of breath, cough, hemoptysis  GI: Denies nausea/vomitting, abd pain  : Denies dysuria, incontinence, or hematuria.  Musculoskeletal: Denies joint pain or myalgias.  Skin/breast: Denies rashes, lumps, lesions, or discharge.  Neurologic: Denies headache, dizziness, vertigo, or paresthesias.    Past Medical History:   Diagnosis Date    Anxiety     Depression     H/O LEEP        Past Surgical History:   Procedure Laterality Date    TONSILLECTOMY         No family history on file.    Social History     Socioeconomic History    Marital status: Single     Spouse name: Not on file    Number of children: Not on file    Years of education: Not on file    Highest education level: Not on file   Occupational History    Not on file   Social Needs    Financial resource strain: Not on file    Food insecurity     Worry: Not on file     Inability: Not on file    Transportation needs     Medical: Not on file     Non-medical: Not on file   Tobacco Use    Smoking status: Current Every Day Smoker     Packs/day: 0.50   Substance and Sexual Activity    Alcohol use: Yes     Frequency: Never     Comment: social    Drug use: Never    Sexual activity: Not on file   Lifestyle    Physical activity     Days per week: Not on file     Minutes per session: Not on file    Stress: Not on file   Relationships    Social connections     Talks on phone: Not on file     Gets together: Not on file     Attends Jainism  service: Not on file     Active member of club or organization: Not on file     Attends meetings of clubs or organizations: Not on file     Relationship status: Not on file   Other Topics Concern    Not on file   Social History Narrative    Not on file       Review of patient's allergies indicates:   Allergen Reactions    Gluten protein     Soy        Medications:  Current Outpatient Medications on File Prior to Visit   Medication Sig Dispense Refill    citalopram hydrobromide (CITALOPRAM ORAL) Take 40 mg by mouth once daily.       cyanocobalamin (VITAMIN B-12) 1000 MCG tablet Take 1 tablet (1,000 mcg total) by mouth once daily.      ergocalciferol (ERGOCALCIFEROL) 50,000 unit Cap Take 1 capsule (50,000 Units total) by mouth every 7 days. 4 capsule 0    gabapentin (NEURONTIN) 300 MG capsule Take 1 capsule (300 mg total) by mouth 3 (three) times daily. 90 capsule 2    glatiramer (COPAXONE) 40 mg/mL injection Inject 40 mg into the skin 3 (three) times a week. 36 Syringe 1    LORazepam (ATIVAN) 0.5 MG tablet Take 1 tablet (0.5 mg total) by mouth every 8 (eight) hours as needed for Anxiety. 10 tablet 0    methocarbamoL (ROBAXIN) 500 MG Tab Take 500 mg by mouth as needed for Migraine.      methylPREDNISolone sodium succinate (SOLU-MEDROL) 1,000 mg injection mix 1 syringe in smoothie and taken orally daily for 3 days. 3 vial 0    nicotine (NICODERM CQ) 21 mg/24 hr Place 1 patch onto the skin once daily. 28 patch 0     No current facility-administered medications on file prior to visit.        /86 (BP Location: Left arm, Patient Position: Sitting)   Pulse 67   Temp 97.9 °F (36.6 °C) (Oral)   Wt 91.8 kg (202 lb 6.1 oz)   LMP 08/02/2020   BMI 34.74 kg/m²   General: Afebrile, alert, comfortable, no acute distress.   HEENT: SEPIDEH. EOMI, no scleral icterus.   Pulmonary: Non labored  Extremities: Moves all extremities x 4.   Skin: No jaundice, rashes, or visible lesions.   Neurological:  Alert and  oriented x 4.      1) Do you have a history of:         YES NO   Diabetes   []        [x]     Autoimmune disease  [x]        [] MS  Cancer              []        [x]   Surgical Removal of Spleen []        [x]       2) Have you had recurrent infections:             YES NO  Sinus infections  []        [x] allergies  Lung infections  []        [x]              Urinary Tract Infections []        [x]                                              Intestinal Infections  []        [x]      Skin Infections   []        [x]       Musculoskeletal Infections    []        [x]   Reproductive Infections []        [x]   Periodontal Disease  []        [x]        3)Have you ever had: YES     NO       Chicken Pox   [x]         []          Shingles   []         [x]            Orolabial Herpes             []         [x]          Genital Herpes  []         [x]           Genital Warts   []         [x]             Cytomegalovirus  []         []          Kenneth-Barr Virus  [x]         []     mono         Hepatitis A   []         [x]          Hepatitis B   []         [x]          Hepatitis C   []         [x]            Syphilis   []         [x]          Gonorrhea   []         [x]         Chlamydia    []         [x]           Parasites / worms  []         [x]         Fungal Infections  []         [x]         Bloodstream Infections []         [x]             4) Tuberculosis             YES NO  Exposure to person with active TB?  []         [x]   H/o homeless?    []         [x]   H/o imprisonment?    []         [x]   Have you ever had a positive PPD?      []         [x]   If yes, what treatment did you receive:          5) Travel    What states have you lived in? [place]    What countries have you visited for more than 2 weeks? [place]                                  YES     NO  Did you have any associated infections?   []         [x]     Are you planning to travel outside of [place]?    []          [x]     6) Animal Exposure                   YES NO  Do you have pets living in your house?   [x]         []  Dog, cats  If yes, describe:     Do you spend time or live on a farm?    []         [x]   If yes, which ones:    Do you have a fish tank?         []  [x]    Do you have a litter box?     [x]         []      Do you fish or hunt?      []         [x]   Do you clean or skin fish or animals?    []         [x]     Consume raw or undercooked meat, fish, shellfish?  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  Nothing unusual              YES     NO  Do you garden or otherwise work in the soil?   []         [x]   Do you hike, camp, or spend time in wooded areas?  []         [x]       9) The patient's immunization history was reviewed.     Have you ever received:  YES NO DATES  Routine Childhood vaccines  [x]         []       Influenza vaccine   []         [x]     Prevnar    []         [x]     Pneumovax    []         [x]     Tetanus-diptheria -pertussis  [x]         [] 2013    Hepatitis A vaccine series       []         [x]     Hepatitis B vaccine series         []         [x]     Meningitis vaccine   []         [x]     Zoster vaccine    []         [x]        Significant labs reviewed:  HepA Ab neg  HepBs Ab neg  HepBs Ag neg  HepBc Ab neg  HepC Ab neg    HIV neg  RPR neg  Quant gold neg    Pending labs:    HIV: No components found for: HIV 1/2 AG/AB  Hepatitis C IgG: No components found for: HEPATITIS C  Syphilis:   RPR   Date Value Ref Range Status   06/11/2020 Non-reactive Non-reactive Final       Hepatitis A IgG: No components found for: HEPATITIS A IGG  Hepatitis Bc IgG: No components found for: HEPATITIS B CORE IGG  Hepatitis Bs IgG:  Quantiferon: No results found for: QUANTIFERON  Toxoplasmosis: No results found for: TOXOPLASMA  VZV IgG: No components found for: VARICELLA IGG    No components found for: SEDIMENTATION RATE  No  components found for: C-REACTIVE PROTEIN      Microbiology x 7d:   Microbiology Results (last 7 days)     ** No results found for the last 168 hours. **            There is no immunization history on file for this patient.    Assessment:     Vaccine Counseling  Immunosupression  Multiple Sclerosis    Plan:     Biologic Response Modifier Candidacy:   Based on available information, there are no identified significant barriers to BRMs from an infectious disease standpoint pending acceptable serologies.  Final determination of BRM candidacy will be made once evaluation is complete and reviewed.    Counseling:  - I discussed with the patient the risk for increased susceptibility to infections following BRM therapy including increased risk for infection.    - Specific guidance has been provided to the patient regarding the patients occupation, hobbies and activities to avoid future infectious complications including but not limited to avoiding undercooked meats and seafood, proper hygiene, and contact with animals.  - The patients has been counseled on the importance of vaccinations including but not limited to a yearly flu vaccine.    Immunizations:     Today:  - Hepatitis A #1  - Hepatitis B #1  - Prevnar (Pneumonia #1)  - TDaP (good for 10 years)    1 month:  - Hepatitis B #2    2 months:  - Pneumovax (Pneumonia #2)    6 months:  - Hepatitis A #2              The patient was encouraged to contact us about any problems that may develop after immunization and possible side effects were reviewed.       Shivani Antoine MD  Infectious Disease

## 2020-08-10 NOTE — PATIENT INSTRUCTIONS
Today:  - Hepatitis A #1  - Hepatitis B #1  - Prevnar (Pneumonia #1)  - TDaP (good for 10 years)    1 month:  - Hepatitis B #2    2 months:  - Pneumovax (Pneumonia #2)    6 months:  - Hepatitis A #2

## 2020-08-12 ENCOUNTER — PATIENT MESSAGE (OUTPATIENT)
Dept: NEUROLOGY | Facility: CLINIC | Age: 35
End: 2020-08-12

## 2020-08-13 RX ORDER — HEPARIN 100 UNIT/ML
500 SYRINGE INTRAVENOUS
Status: CANCELLED | OUTPATIENT
Start: 2020-08-13

## 2020-08-13 RX ORDER — FAMOTIDINE 10 MG/ML
20 INJECTION INTRAVENOUS
Status: CANCELLED | OUTPATIENT
Start: 2020-08-13

## 2020-08-13 RX ORDER — ACETAMINOPHEN 325 MG/1
1000 TABLET ORAL
Status: CANCELLED | OUTPATIENT
Start: 2020-08-13

## 2020-08-13 RX ORDER — DIPHENHYDRAMINE HYDROCHLORIDE 50 MG/ML
50 INJECTION INTRAMUSCULAR; INTRAVENOUS
Status: CANCELLED | OUTPATIENT
Start: 2020-08-13

## 2020-08-13 RX ORDER — EPINEPHRINE 0.3 MG/.3ML
0.3 INJECTION SUBCUTANEOUS
Status: CANCELLED | OUTPATIENT
Start: 2020-08-13

## 2020-08-13 RX ORDER — SODIUM CHLORIDE 0.9 % (FLUSH) 0.9 %
10 SYRINGE (ML) INJECTION
Status: CANCELLED | OUTPATIENT
Start: 2020-08-13

## 2020-08-13 NOTE — TELEPHONE ENCOUNTER
Ocrevus safety labs reviewed and unremarkable.  Strongyloides pending.  Ocrevus therapy plan signed

## 2020-08-14 ENCOUNTER — TELEPHONE (OUTPATIENT)
Dept: PSYCHIATRY | Facility: CLINIC | Age: 35
End: 2020-08-14

## 2020-08-14 ENCOUNTER — CLINICAL SUPPORT (OUTPATIENT)
Dept: INFECTIOUS DISEASES | Facility: CLINIC | Age: 35
End: 2020-08-14
Payer: COMMERCIAL

## 2020-08-14 PROCEDURE — 90739 HEPB VACC 2/4 DOSE ADULT IM: CPT | Mod: S$GLB,,, | Performed by: INTERNAL MEDICINE

## 2020-08-14 PROCEDURE — 90739 HEPATITIS B (RECOMBINANT) ADJUVANTED, 2 DOSE: ICD-10-PCS | Mod: S$GLB,,, | Performed by: INTERNAL MEDICINE

## 2020-08-14 PROCEDURE — 90471 IMMUNIZATION ADMIN: CPT | Mod: S$GLB,,, | Performed by: INTERNAL MEDICINE

## 2020-08-14 PROCEDURE — 90471 HEPATITIS B (RECOMBINANT) ADJUVANTED, 2 DOSE: ICD-10-PCS | Mod: S$GLB,,, | Performed by: INTERNAL MEDICINE

## 2020-08-14 NOTE — TELEPHONE ENCOUNTER
"Spoke with pt by phone re: disability questions.  Pt states she returned to work on 8/10/20, per Dr. Oro' letter but has had to call out or leave early a few times 2/2 "MS attacks".  She was encouraged by her employer to use the remaining 3 weeks of her extended illness leave and she was wondering about short-term disability.  SW explained that STD would be a benefit from her employer and explained that [employer] does not have STD benefit.  She will contact her HR person to see if she has STD benefits available.      ALEXANDRA explained that Dr. Oro would also have to agree that pt is temporarily disabled and be willing to completed medical forms for STD benefits.  Will message her about pt's request and we will talk more next week.   "

## 2020-08-18 ENCOUNTER — PATIENT MESSAGE (OUTPATIENT)
Dept: INFECTIOUS DISEASES | Facility: HOSPITAL | Age: 35
End: 2020-08-18

## 2020-08-18 ENCOUNTER — TELEPHONE (OUTPATIENT)
Dept: NEUROLOGY | Facility: CLINIC | Age: 35
End: 2020-08-18

## 2020-08-18 ENCOUNTER — CLINICAL SUPPORT (OUTPATIENT)
Dept: REHABILITATION | Facility: HOSPITAL | Age: 35
End: 2020-08-18
Attending: PSYCHIATRY & NEUROLOGY
Payer: COMMERCIAL

## 2020-08-18 ENCOUNTER — TELEPHONE (OUTPATIENT)
Dept: PHARMACY | Facility: CLINIC | Age: 35
End: 2020-08-18

## 2020-08-18 DIAGNOSIS — G35 MULTIPLE SCLEROSIS: ICD-10-CM

## 2020-08-18 DIAGNOSIS — R53.82 CHRONIC FATIGUE: ICD-10-CM

## 2020-08-18 PROCEDURE — 97161 PT EVAL LOW COMPLEX 20 MIN: CPT | Mod: PO

## 2020-08-18 NOTE — PLAN OF CARE
[facility]  Physical Therapy Neurological Rehabilitation Initial Evaluation    Name: Cari Barillas  Clinic Number: [id number]    Therapy Diagnosis:   Encounter Diagnoses   Name Primary?    Multiple sclerosis     Chronic fatigue      Physician: Aida Oro MD    Physician Orders: PT Eval and Treat    Medical Diagnosis from Referral: G35 (ICD-10-CM) - Multiple sclerosis   Evaluation Date: 8/18/2020  Authorization Period Expiration: 08/21/2020   Plan of Care Expiration: 9/18/2020  Visit # / Visits authorized: 1 / 1 follow up auth pending    Time In: 1450  Time Out: 1530  Total Billable Time: 40 minutes    Precautions: Standard    Subjective   Date of onset: June 2020 Dx MS   History of current condition - Cari reports: L Leg feels weak and difficulty with balancing. Walking distance has declined - was walking dog a full mile, now less than 2 blocks.      Medical History:   Past Medical History:   Diagnosis Date    Anxiety     Depression     H/O LEEP        Surgical History:   Cari Barillas  has a past surgical history that includes Tonsillectomy.    Medications:   Cari has a current medication list which includes the following prescription(s): citalopram hydrobromide, cyanocobalamin, ergocalciferol, gabapentin, glatiramer, lorazepam, methocarbamol, and nicotine.    Allergies:   Review of patient's allergies indicates:   Allergen Reactions    Gluten protein     Soy       Prior Therapy: None  Social History:  lives alone  Falls: None but near falls weekly - mostly when tired at end of day   DME:   none  Home Environment: 3 SUE    Exercise Routine / History: chair yoga - started a week ago, has done daily since.   Family Present at time of Eval: alone   Occupation: n/a  Prior Level of Function: Independent  Current Level of Function: Jackelyn with near falls.     Pain:  Current 0/10     Patient's goals: To have L leg cooperate better and get back to normal activities (non chair  yoga and walk dog full mile)    Objective     Patient's mobility presenting to therapy evaluation: walks    Mental status: alert, oriented to person, place, and time  Appearance: Casually dressed  Behavior:  calm and cooperative  Attention Span and Concentration:  Normal  Follows commands: 100% of time   Speech: no deficits     Dominant hand:  right     Posture Alignment in sitting: WNL     Posture Alignment in standing: WNL     Sensation: Light Touch: Impaired: L LE tingling      Edema observed: No        Tone: normal    Visual/Auditory: denies changes     Coordination:  LE coordination:   Intact    ROM:   UPPER EXTREMITY--AROM/PROM  (R) UE: WNLs  (L) UE: WNLs         RANGE OF MOTION--LOWER EXTREMITIES  (R) LE Hip: normal   Knee: normal   Ankle: normal    (L) LE: Hip: normal   Knee: normal   Ankle: normal    Lower Extremity Strength  Right LE  Left LE    Hip Flexion: 5/5 Hip Flexion: 5/5   Hip Extension:  5/5 Hip Extension: 4+/5   Hip Abduction: 5/5 Hip Abduction: 5/5   Hip Adduction: 5/5 Hip Adduction 5/5   Knee Extension: 5/5 Knee Extension: 5/5   Knee Flexion: 5/5 Knee Flexion: 5/5   Ankle Dorsiflexion: 5/5 Ankle Dorsiflexion: 5/5     Flexibility: intact     Evaluation 08/18/2020   30 second Chair Rise 15 completed with no arms         Balance Testing    Evaluation 08/18/2020   Tandem Stance R LE forward, eyes open 30s  (<30 sec = Increased FALL RISK)   Tandem Stance L LE forward, eyes open 30s  (<30 sec = Increased FALL RISK)   Single Limb Stance R LE  eyes open 30s  (<10 sec = HIGH FALL RISK)   Single Limb Stance L LE  eyes open 30s  (<10 sec = HIGH FALL RISK)       Postural control: MCTSIB: Evaluation 08/18/2020   1. Eyes Open/feet together/Firm:  30 seconds   2. Eyes Closed/feet together/Firm:  30 seconds   3. Eyes Open/feet together/Foam:  30 seconds   4. Eyes Closed/feet together/Foam:  30 seconds       GAIT ASSESSMENT  - AD used: No Assistive Device   - Assistance: independence  - Distance: LIMITED  community distances    Observed Gait Deviations:  Cari displays the following deviations with ambulation:  Normal, does not require assistive devices, normalized pattern during clinic eval    Impairments contributing to deviations/impairments: decreased endurance     Evaluation 08/18/2020   Self Selected Walking Speed 1.0 m/sec (6m/6s) with  No Assistive Device   Fast Walking Speed 2.0 m/sec (6m/3s) with  No Assistive Device   Activities Balance Confidence (ABC) scale  (<80% = increased fall risk; special pops: PD <69%, Vestibular <67%) 75.6%      Functional Gait Assessment:   1. Gait on level surface =  3  2. Change in Gait Speed = 3  3. Gait with horizontal head turns  = 3  4. Gait with vertical head turns = 3  5. Gait with pivot turns = 3  6. Step over obstacle = 3  7. Gait with Narrow MARIANA = 3  8. Gait with eyes closed = 1  9. Ambulating Backwards = 3  10. Steps = 2  Score 27/30   FGA cutoff scores:   <22/30 fall risk   <20/30 fall risk in older adults   <18/30 fall risk in Parkinsons     Endurance Deficit: moderate * fatigue is a primary complaint    Functional Mobility (Bed mobility, transfers)  Bed mobility:  I  Supine to sit:  I  Sit to supine:  I  Sit to stand:   I  Stand pivot:    I  Transfers to bed:  I  Transfers to toilet: I  Transfers to shower / tub:   I  Car transfers:   I  Wheelchair mobility:  n/a    ADL's:  Feeding: I  Grooming: I  Hygiene: I  UB Dressing: I  LB Dressing: I  Toileting: I  Bathing: I      CMS Impairment/Limitation/Restriction for FOTO MS Survey    Therapist reviewed FOTO scores for Cari Barillas on 8/18/2020.   FOTO documents entered into EP[employer] - see Media section.    Limitation Score: 24%         TREATMENT   No treatment provided today. All time spent on evaluation.     Home Exercises and Patient Education Provided    Education provided: POC, scheduling, goals of therapy    Written Home Exercises Provided: No. To be established at initial follow up session.        Assessment    Cari is a 34 y.o. female referred to outpatient Physical Therapy with a medical diagnosis of Multiple Sclerosis. She is newly diagnosed in June of this year and has only minimal physical changes at current. Patient presents with complaints of LLE weakness and excessive fatigue which bothers her most at the end of the day. She denies falls but reports at least weekly near falls, also related to fatigue. She scored within normal limits for almost all testing today. She was slightly declined on the FGA with most difficulty descending stairs with visible instability. She also is below the fall risk threshold for ABC at 75%. I am recommending skilled PT 1x/week for 4 weeks to focus on teaching appropriate HEP, energy conservation techniques and to work on strength and endurance training of LLE.     Patient prognosis is Good.   Patient will benefit from skilled outpatient Physical Therapy to address the deficits stated above and in the chart below, provide patient/family education, and to maximize patient's level of independence.     Plan of care discussed with patient: Yes  Patient's spiritual, cultural and educational needs considered and patient is agreeable to the plan of care and goals as stated below:     Anticipated Barriers for therapy: progressive nature of MS    Medical Necessity is demonstrated by the following  History  Co-morbidities and personal factors that may impact the plan of care Co-morbidities: anxiety / depression  Personal Factors:   coping style     moderate   Examination  Body Structures and Functions, activity limitations and participation restrictions that may impact the plan of care Body Regions:   lower extremities    Body Systems:    balance  gait  endurance             high   Clinical Presentation stable and uncomplicated low   Decision Making/ Complexity Score: low     Goals: 4 weeks  Pt will improve Functional Gait Assessment (FGA) score to at least 29/30 for increased independence with  home and community ambulation.   Pt will improve Activities Balance Confidence (ABC) score to at least 80% for decreased fear of falling with daily activities.   Pt will improve FOTO limitation score to </= 20% for improved self perception of functional mobility.  Pt will report walking her dog at least 5 blocks with no loss of balance.   Pt will have HEP in place.        Plan   Plan of care Certification: 8/18/2020 to 9/18/2020.    Outpatient Physical Therapy 1 times weekly for 4 weeks to include the following interventions: Gait Training, Moist Heat/ Ice, Neuromuscular Re-ed, Patient Education, Therapeutic Activites and Therapeutic Exercise.       Shana Terry, PT

## 2020-08-18 NOTE — TELEPHONE ENCOUNTER
----- Message from Saida Paulson LPN sent at 8/18/2020 12:28 PM CDT -----  Regarding: FW: VIRTUAL VISIT  Contact: Self    ----- Message -----  From: Courtney Greene  Sent: 8/18/2020  12:04 PM CDT  To: Preethi Parra Staff  Subject: VIRTUAL VISIT                                    Pt states she have been waiting for someone to connect to have her virtual visit however no one connect Pt ask for a call      Contact info  [phone]

## 2020-08-19 ENCOUNTER — OFFICE VISIT (OUTPATIENT)
Dept: NEUROLOGY | Facility: CLINIC | Age: 35
End: 2020-08-19
Payer: COMMERCIAL

## 2020-08-19 DIAGNOSIS — M79.2 NEUROPATHIC PAIN: ICD-10-CM

## 2020-08-19 DIAGNOSIS — G35 MULTIPLE SCLEROSIS: Primary | ICD-10-CM

## 2020-08-19 PROCEDURE — 99215 OFFICE O/P EST HI 40 MIN: CPT | Mod: 95,,, | Performed by: PSYCHIATRY & NEUROLOGY

## 2020-08-19 PROCEDURE — 99215 PR OFFICE/OUTPT VISIT, EST, LEVL V, 40-54 MIN: ICD-10-PCS | Mod: 95,,, | Performed by: PSYCHIATRY & NEUROLOGY

## 2020-08-21 NOTE — TELEPHONE ENCOUNTER
Discussed return to work letter and accommodations/restrictions with Dr. Oro and then emailed letter to pt.

## 2020-08-25 ENCOUNTER — PATIENT MESSAGE (OUTPATIENT)
Dept: NEUROLOGY | Facility: CLINIC | Age: 35
End: 2020-08-25

## 2020-08-25 ENCOUNTER — DOCUMENTATION ONLY (OUTPATIENT)
Dept: REHABILITATION | Facility: HOSPITAL | Age: 35
End: 2020-08-25

## 2020-08-25 NOTE — PROGRESS NOTES
Documentation Only/ No Show    Patient: Cari Barillas  Date of Session: 08/25/2020  MRN: [id number]  Cari Barillas did not attend his/her scheduled therapy appointment today. Cari Barillas did not call to cancel nor reschedule. This is the 1st appointment that the patient has not attended. No charges have been posted today.     Shana Terry, PT  08/25/2020

## 2020-08-26 NOTE — PROGRESS NOTES
Subjective:          Patient ID: Cari Barillas is a 34 y.o. female who presents today for a fit-in clinic visit for MS.      MS HPI:  · DMT: glatiramer acetate  · Side effects from DMT? No  · Taking vitamin D3 as recommended? Yes - [id number]/week  · No new symptoms. Feeling better. Tolerated oral solumedrol well without issues. Still with significant constitutional fatigue and motor fatigue, but doing well with PT/OT.    Medications:  Current Outpatient Medications   Medication Sig    citalopram hydrobromide (CITALOPRAM ORAL) Take 40 mg by mouth once daily.     cyanocobalamin (VITAMIN B-12) 1000 MCG tablet Take 1 tablet (1,000 mcg total) by mouth once daily.    ergocalciferol (ERGOCALCIFEROL) 50,000 unit Cap Take 1 capsule (50,000 Units total) by mouth every 7 days. (Patient not taking: Reported on 8/4/2020)    gabapentin (NEURONTIN) 300 MG capsule Take 1 capsule (300 mg total) by mouth 3 (three) times daily.    glatiramer (COPAXONE) 40 mg/mL injection Inject 40 mg into the skin 3 (three) times a week.    LORazepam (ATIVAN) 0.5 MG tablet Take 1 tablet (0.5 mg total) by mouth every 8 (eight) hours as needed for Anxiety.    methocarbamoL (ROBAXIN) 500 MG Tab Take 500 mg by mouth as needed for Migraine.    nicotine (NICODERM CQ) 21 mg/24 hr Place 1 patch onto the skin once daily. (Patient not taking: Reported on 8/4/2020)     No current facility-administered medications for this visit.        SOCIAL HISTORY  Social History     Tobacco Use    Smoking status: Current Every Day Smoker     Packs/day: 0.50   Substance Use Topics    Alcohol use: Yes     Frequency: Never     Comment: social    Drug use: Never           ROS:    REVIEW OF SYMPTOMS 7/13/2020   Do you feel abnormally tired on most days? Yes   Do you feel you generally sleep well? Yes   Do you have difficulty controlling your bladder?  No   Do you have difficulty controlling your bowels?  No   Do you have frequent muscle cramps, tightness or  spasms in your limbs?  Yes   Do you have new visual symptoms?  No   Do you have worsening difficulty with your memory or thinking? No   Do you have worsening symptoms of anxiety or depression?  No   For patients who walk, Do you have more difficulty walking?  No   Have you fallen since your last visit?  No   For patients who use wheelchairs: Do you have any skin wounds or breakdown? No   Do you have difficulty using your hands?  No   Do you have shooting or burning pain? Yes   Do you have difficulty with sexual function?  No   If you are sexually active, are you using birth control? Y/N  N/A No   Do you often choke when swallowing liquids or solid food?  No   Do you experience worsening symptoms when overheated? Yes   Do you need any new equipment such as a wheelchair, walker or shower chair? No   Do you receive co-pay financial assistance for your principal MS medicine? Yes   Would you be interested in participating in an MS research trial in the future? Yes   For patients on Gilenya, Tecfidera, Aubagio, Rituxan, Ocrevus, Tysabri, Lemtrada or Methotrexate, are you aware that you should NOT receive live virus vaccines?  No   Do you feel you have adequate family/friend support?  Yes   Do you have health insurance?   Yes   Are you currently employed? Yes   Do you receive SSDI/SSI?  Not Applicable   Do you use marijuana or cannabis products? No   Have you been diagnosed with a urinary tract infection since your last visit here? No   Have you been diagnosed with a respiratory tract infection since your last visit here? No   Have you been to the emergency room since your last visit here? No   Have you been hospitalized since your last visit here?  No                Objective:        1. 25 foot timed walk:  Timed 25 Foot Walk: 7/15/2020 8/4/2020   Did patient wear an AFO? No No   Was assistive device used? No No   Time for 25 Foot Walk (seconds) 5.3 4.35   Time for 25 Foot Walk (seconds) - 4.06       2. 9 Hole Peg  Test:  No flowsheet data found.    Neurologic Exam  MENTAL STATUS: grossly intact. Normal language, attention, and memory.   CRANIAL NERVE EXAM: Extraocular muscles are intact.  No facial asymmetry. tongue midline. Shoulder shrug normal b/l There is no dysarthria.   MOTOR EXAM: Normal bulk and tone throughout UE and LE bilaterally. Rapid sequential movements are normal. Strength is 5/5 in all groups in the lower extremities and upper extremities except Left deltoid and HF weakness 5-.   SENSORY EXAM: Normal LT t/o except Left leg paresthesias  COORDINATION: Normal finger-to-nose exam.   GAIT: Narrow based and stable.    Imaging:     No results found for this or any previous visit.  No results found for this or any previous visit.  No results found for this or any previous visit.  Results for orders placed during the hospital encounter of 08/05/20   MRI Brain Demyelinating W W/O Contrast    Impression Scattered small T2/FLAIR hyperintensities within the supratentorial white matter compatible with given history of multiple sclerosis.  New enhancing subcentimeter lesion within the body of the right corpus callosum compatible with active demyelination.  Additional details above.    Partially visualized right lateral T2/STIR hyperintensity at C6-C7 which demonstrates some postcontrast enhancement.  Lesion new from recent MRI cervical spine and concerning for active demyelination.    This report was flagged in Epic as abnormal.    Electronically signed by resident: Josemanuel Graf  Date:    08/06/2020  Time:    09:01    Electronically signed by: Marty De Oliveira MD  Date:    08/06/2020  Time:    09:31     Results for orders placed during the hospital encounter of 06/10/20   MRI Cervical Spine Demyelinating W W/O Contrast    Impression Examination mildly degraded by patient motion artifact.    2 solidly enhancing lesions in the supratentorial white matter as above.  Two additional subcentimeter nonenhancing lesions elsewhere.   Although not entirely specific, findings concerning for demyelinating disease (such as multiple sclerosis) with areas of active demyelination.  Lymphoma or other process not entirely excluded.  Clinical correlation required with follow-up suggested    Cervical spine appears within normal limits.  No cervical cord lesions identified.    This report was flagged in Epic as abnormal.      Electronically signed by: Levon Sierra MD  Date:    06/11/2020  Time:    10:13     Results for orders placed during the hospital encounter of 08/05/20   MRI Thoracic Spine Demyelinating W W/O Contrast    Impression Scattered small T2/FLAIR hyperintensities within the supratentorial white matter compatible with given history of multiple sclerosis.  New enhancing subcentimeter lesion within the body of the right corpus callosum compatible with active demyelination.  Additional details above.    Partially visualized right lateral T2/STIR hyperintensity at C6-C7 which demonstrates some postcontrast enhancement.  Lesion new from recent MRI cervical spine and concerning for active demyelination.    This report was flagged in Epic as abnormal.    Electronically signed by resident: Josemanuel Graf  Date:    08/06/2020  Time:    09:01    Electronically signed by: Marty De Oliveira MD  Date:    08/06/2020  Time:    09:31         Labs:     Lab Results   Component Value Date    WVEBNBBV32BX 18 (L) 06/11/2020     Lab Results   Component Value Date    JCVINDEX 0.66 (H) 06/24/2020    JCVANTIBODY POSITIVE (A) 06/24/2020     Lab Results   Component Value Date    LZ9OHIMW 82.3 06/24/2020    ABSOLUTECD3 2529.0 (H) 06/24/2020    ET1NEJEY 15.6 06/24/2020    ABSOLUTECD8 481.0 06/24/2020    TA5GLLKG 66.1 (H) 06/24/2020    ABSOLUTECD4 2031.0 (H) 06/24/2020    LABCD48 4.22 (H) 06/24/2020     Lab Results   Component Value Date    WBC 10.63 06/12/2020    RBC 4.54 06/12/2020    HGB 13.7 06/12/2020    HCT 41.5 06/12/2020    MCV 91 06/12/2020    MCH 30.2 06/12/2020     MCHC 33.0 06/12/2020    RDW 12.8 06/12/2020     06/12/2020    MPV 11.3 06/12/2020    GRAN 6.5 06/12/2020    GRAN 61.4 06/12/2020    LYMPH 3.3 06/12/2020    LYMPH 31.4 06/12/2020    MONO 0.6 06/12/2020    MONO 5.2 06/12/2020    EOS 0.1 06/12/2020    BASO 0.03 06/12/2020    EOSINOPHIL 1.1 06/12/2020    BASOPHIL 0.3 06/12/2020     Sodium   Date Value Ref Range Status   06/12/2020 138 136 - 145 mmol/L Final     Potassium   Date Value Ref Range Status   06/12/2020 4.2 3.5 - 5.1 mmol/L Final     Chloride   Date Value Ref Range Status   06/12/2020 107 95 - 110 mmol/L Final     CO2   Date Value Ref Range Status   06/12/2020 21 (L) 23 - 29 mmol/L Final     Glucose   Date Value Ref Range Status   06/12/2020 95 70 - 110 mg/dL Final     BUN, Bld   Date Value Ref Range Status   06/12/2020 9 6 - 20 mg/dL Final     Creatinine   Date Value Ref Range Status   06/12/2020 0.7 0.5 - 1.4 mg/dL Final     Calcium   Date Value Ref Range Status   06/12/2020 9.3 8.7 - 10.5 mg/dL Final     Total Protein   Date Value Ref Range Status   06/12/2020 6.7 6.0 - 8.4 g/dL Final     Albumin   Date Value Ref Range Status   06/12/2020 3.5 3.5 - 5.2 g/dL Final     Total Bilirubin   Date Value Ref Range Status   06/12/2020 0.3 0.1 - 1.0 mg/dL Final     Comment:     For infants and newborns, interpretation of results should be based  on gestational age, weight and in agreement with clinical  observations.  Premature Infant recommended reference ranges:  Up to 24 hours.............<8.0 mg/dL  Up to 48 hours............<12.0 mg/dL  3-5 days..................<15.0 mg/dL  6-29 days.................<15.0 mg/dL       Alkaline Phosphatase   Date Value Ref Range Status   06/12/2020 85 55 - 135 U/L Final     AST   Date Value Ref Range Status   06/12/2020 17 10 - 40 U/L Final     ALT   Date Value Ref Range Status   06/12/2020 23 10 - 44 U/L Final     Anion Gap   Date Value Ref Range Status   06/12/2020 10 8 - 16 mmol/L Final     eGFR if     Date Value Ref Range Status   06/12/2020 >60 >60 mL/min/1.73 m^2 Final     eGFR if non    Date Value Ref Range Status   06/12/2020 >60 >60 mL/min/1.73 m^2 Final     Comment:     Calculation used to obtain the estimated glomerular filtration  rate (eGFR) is the CKD-EPI equation.        Lab Results   Component Value Date    HEPBSAG Negative 08/10/2020    HEPBSAB Negative 08/10/2020    HEPBCAB Negative 08/10/2020           MS Impression and Plan:     NEURO MULTIPLE SCLEROSIS IMPRESSION:   MS Status:     Number of relapses in the past year?:  2    Clinical Progression:  Improved    MRI Progression:  Worsened (new lesions)  Plan:     DMT:  Implement Disease Modifying Therapy    Implement Disease Modifying Therapy:  Ocrelizumab    Symptom Management:  No change in symptom management  MS counseling given and multiple questions answered.  F/u with me or Farida in 2months        Our visit today lasted 40 minutes, and 100% of this time was spent face to face with the patient. Over 50% of this visit included discussion of the treatment plan/medication changes/symptom management/exam findings/imaging results/coordination of care. The patient agrees with the plan of care.    Problem List Items Addressed This Visit     None          Aida Oro MD

## 2020-09-01 ENCOUNTER — TELEPHONE (OUTPATIENT)
Dept: REHABILITATION | Facility: HOSPITAL | Age: 35
End: 2020-09-01

## 2020-09-01 ENCOUNTER — DOCUMENTATION ONLY (OUTPATIENT)
Dept: REHABILITATION | Facility: HOSPITAL | Age: 35
End: 2020-09-01

## 2020-09-01 NOTE — TELEPHONE ENCOUNTER
Left voicemail with patient informing her of her 2 missed PT appointments.  Reminded her of her next appointment 9/8/2020 at 1:15PM.   Asked that she call and cancel if she is no longer interested in therapy services. Also stated that if she no-shows her next appointment we will have to cancel all future appointments until we hear from her to reschedule.     Shana Terry, PT  09/01/2020

## 2020-09-01 NOTE — PROGRESS NOTES
Documentation Only/ No Show    Patient: Cari Barillas  Date of Session: 09/01/2020  MRN: [id number]  Cari Barillas did not attend his/her scheduled therapy appointment today. Cari Barillas did not call to cancel nor reschedule. This is the 2nd appointment that the patient has not attended. No charges have been posted today.     Shana Terry, PT  09/01/2020

## 2020-09-08 ENCOUNTER — CLINICAL SUPPORT (OUTPATIENT)
Dept: REHABILITATION | Facility: HOSPITAL | Age: 35
End: 2020-09-08
Attending: PSYCHIATRY & NEUROLOGY
Payer: COMMERCIAL

## 2020-09-08 DIAGNOSIS — R53.82 CHRONIC FATIGUE: ICD-10-CM

## 2020-09-08 DIAGNOSIS — G35 MULTIPLE SCLEROSIS: ICD-10-CM

## 2020-09-08 PROCEDURE — 97110 THERAPEUTIC EXERCISES: CPT | Mod: PO

## 2020-09-08 PROCEDURE — 97112 NEUROMUSCULAR REEDUCATION: CPT | Mod: PO

## 2020-09-08 NOTE — PATIENT INSTRUCTIONS
"We talked about "activity pacing" or spreading out your activity throughout the day to make sure you are taking adequate breaks.    We also talked about "energy banking" where you label activities as "high energy" or "low energy" keeping in mind that you only have so much energy to "spend" throughout the day.  Plan "high energy" activities with lower energy ones to keep you from "overspending" (paying for it later).    Upright bike or cardio exercise: 15-30 min 3x a week.   Keep in mind "energy bank" can replace with other physical activities like walking dog when not too hot outside.     Balance tasks if not doing a YOGA routine:   Ankle Plantar Flexion / Dorsiflexion, Standing    Stand while holding a stable object.   Rise up on toes x 20 reps.   Then rock back on heels x 20 reps.    Repeat 2 rounds of 20 each. 1-2 sessions per day.  (If this becomes easy, start to try heel raises and toe raises without holding but have support within reach.)    Single leg balance with support in reach    Stand on one leg in neutral spine without support. Hold up to 30 seconds.  Repeat on other leg.  Do 3-5 repetitions.  1-2 sessions per day.     [email or web address]     Feet Heel-Toe "Tandem", Varied Arm Positions     With eyes open, right foot directly in front of the other, arms out, look straight ahead at a stationary object. Hold up to 30 seconds.  Repeat with left foot in front. Can change arm position for varied difficulty.   Do 3-5 repetitions.  1-2 sessions per day.    Copyright © I. All rights reserved.         "

## 2020-09-08 NOTE — PROGRESS NOTES
Physical Therapy Treatment Note     Name: Cari Wahl [facility] Number: [id number]    Therapy Diagnosis:   Encounter Diagnoses   Name Primary?    Chronic fatigue     Multiple sclerosis      Physician: Aida Oro MD    Visit Date: 9/8/2020    Physician Orders: PT Eval and Treat    Medical Diagnosis from Referral: G35 (ICD-10-CM) - Multiple sclerosis   Evaluation Date: 8/18/2020  Authorization Period Expiration: 08/21/2020   Plan of Care Expiration: 9/18/2020  Visit # / Visits authorized: 2 / 1 follow up auth pending     Time In: 1520  Time Out: 1600  Total Billable Time: 40 minutes       Precautions: Standard      Subjective     Pt reports:  Started beginners yoga activities. Feels strong - just nerves about balance.   She will be given HEP today compliant with home exercise program.  Response to previous treatment: eval only  Functional change: ongoing - balance improved    Pain: 0/10 not rated as pain but does note burning at L LE   Location: n/a      Objective     Cari received therapeutic exercises to develop endurance and flexibility for 15 minutes including:  energy conservation techniques  Upright bike L5 easy interval hills x 10 min  Heel cord stretch 3 x 45s at incline board      Cari participated in neuromuscular re-education activities to improve: Balance and Coordination for 25 minutes. The following activities were included:  Tandem stance  SLS trials 15s B    Heel raises x20  Toe raises x 20    Tandem walking unsupported  Backwards tandem unsupported  Eyes closed walking x15 steps, no deviation  stepping over blocks  Walking head turns  Stairs - step through no rail    Airex beam: tandem walking and lateral stepping      Home Exercises Provided and Patient Education Provided     Education provided: HEP    Written Home Exercises Provided: yes.  Exercises were reviewed and Cari was able to demonstrate them prior to the end of the session.  Cari demonstrated good  understanding of the  education provided.     See EMR under Patient Instructions for exercises provided 9/8/2020.    Assessment     Cari did very well with today's follow up visit. She has improved with her general confidence walking and with balance tasks today. She was improved with no deviations for eyes closed walking and with step through gait on stairs. Extensive education on energy conservation and home fitness. Continue tx.       Cari is progressing well towards her goals.   Pt prognosis is Excellent.     Pt will continue to benefit from skilled outpatient physical therapy to address the deficits listed in the problem list box on initial evaluation, provide pt/family education and to maximize pt's level of independence in the home and community environment.     Pt's spiritual, cultural and educational needs considered and pt agreeable to plan of care and goals.     Anticipated barriers to physical therapy: progressive nature of MS       Goals: 4 weeks  Pt will improve Functional Gait Assessment (FGA) score to at least 29/30 for increased independence with home and community ambulation.   Pt will improve Activities Balance Confidence (ABC) score to at least 80% for decreased fear of falling with daily activities.   Pt will improve FOTO limitation score to </= 20% for improved self perception of functional mobility.  Pt will report walking her dog at least 5 blocks with no loss of balance.   Pt will have HEP in place.       Plan     Review HEP, endurance training and discharge assessment next visit.    Shana Terry, PT

## 2020-09-16 ENCOUNTER — PATIENT MESSAGE (OUTPATIENT)
Dept: NEUROLOGY | Facility: CLINIC | Age: 35
End: 2020-09-16

## 2020-09-17 ENCOUNTER — PATIENT MESSAGE (OUTPATIENT)
Dept: NEUROLOGY | Facility: CLINIC | Age: 35
End: 2020-09-17

## 2020-09-22 ENCOUNTER — TELEPHONE (OUTPATIENT)
Dept: SMOKING CESSATION | Facility: CLINIC | Age: 35
End: 2020-09-22

## 2020-09-22 ENCOUNTER — OFFICE VISIT (OUTPATIENT)
Dept: NEUROLOGY | Facility: CLINIC | Age: 35
End: 2020-09-22
Payer: COMMERCIAL

## 2020-09-22 ENCOUNTER — PATIENT MESSAGE (OUTPATIENT)
Dept: NEUROLOGY | Facility: CLINIC | Age: 35
End: 2020-09-22

## 2020-09-22 VITALS — WEIGHT: 190 LBS | BODY MASS INDEX: 32.61 KG/M2

## 2020-09-22 DIAGNOSIS — G35 MULTIPLE SCLEROSIS: Primary | ICD-10-CM

## 2020-09-22 DIAGNOSIS — R20.2 PARESTHESIA: ICD-10-CM

## 2020-09-22 DIAGNOSIS — E55.9 VITAMIN D INSUFFICIENCY: ICD-10-CM

## 2020-09-22 DIAGNOSIS — Z71.89 COUNSELING REGARDING GOALS OF CARE: ICD-10-CM

## 2020-09-22 PROCEDURE — 3008F BODY MASS INDEX DOCD: CPT | Mod: CPTII,,, | Performed by: PHYSICIAN ASSISTANT

## 2020-09-22 PROCEDURE — 3008F PR BODY MASS INDEX (BMI) DOCUMENTED: ICD-10-PCS | Mod: CPTII,,, | Performed by: PHYSICIAN ASSISTANT

## 2020-09-22 PROCEDURE — 99215 OFFICE O/P EST HI 40 MIN: CPT | Mod: 95,,, | Performed by: PHYSICIAN ASSISTANT

## 2020-09-22 PROCEDURE — 99215 PR OFFICE/OUTPT VISIT, EST, LEVL V, 40-54 MIN: ICD-10-PCS | Mod: 95,,, | Performed by: PHYSICIAN ASSISTANT

## 2020-09-22 RX ORDER — CHOLECALCIFEROL (VITAMIN D3) 25 MCG
5000 TABLET ORAL DAILY
COMMUNITY
End: 2021-02-09

## 2020-09-22 RX ORDER — MAGNESIUM 250 MG
250 TABLET ORAL DAILY
COMMUNITY

## 2020-09-22 NOTE — PROGRESS NOTES
"Subjective:          Patient ID: Cari Barillas is a 34 y.o. female who presents today for a fit-in clinic visit for MS.      MS HPI:  · DMT: None at present has been off Glatiramer waiting to transition to Ocrevus(Ocrevus denied by insurance-appealing currently)  · Taking vitamin D3 as recommended? Yes   · L leg intermittently numb  · Missed work-recently "demoted" (now has less pay)-this is a financial stressor  · In process of switching to Ocrevus due to recent MRI changes in August-waiting on appeal approval-insurance denied original   · Has not been on copaxone for the last month while awaiting transition to Ocrevus  · For the last week-patient having L LE/UE neuropathic pain with increased spasms(not new symptoms but worse)  · Some Pin/needles  Sensation to R LE(mid calf distal). R UE hand to mid forearm) -intermittent --these are new symptoms that she has not had in the past  · No weakness, she is currently in PT  · Saeed [employer]-feels like things they "want me to quit". She has been off of work for several days  · She has received oral steroids(did not get the same benefit as IV she feels).   · She feels as though she is relapsing and states that her MS is not under control    The patient location is: home  The chief complaint leading to consultation is: sensory symptoms    Visit type: audiovisual    Face to Face time with patient: 30  45 minutes of total time spent on the encounter, which includes face to face time and non-face to face time preparing to see the patient (eg, review of tests), Obtaining and/or reviewing separately obtained history, Documenting clinical information in the electronic or other health record, Independently interpreting results (not separately reported) and communicating results to the patient/family/caregiver, or Care coordination (not separately reported).         Each patient to whom he or she provides medical services by telemedicine is:  (1) informed of the " relationship between the physician and patient and the respective role of any other health care provider with respect to management of the patient; and (2) notified that he or she may decline to receive medical services by telemedicine and may withdraw from such care at any time.    Notes:     Medications:  Current Outpatient Medications   Medication Sig    citalopram hydrobromide (CITALOPRAM ORAL) Take 40 mg by mouth once daily.     cyanocobalamin (VITAMIN B-12) 1000 MCG tablet Take 1 tablet (1,000 mcg total) by mouth once daily.    ergocalciferol (ERGOCALCIFEROL) 50,000 unit Cap Take 1 capsule (50,000 Units total) by mouth every 7 days.    gabapentin (NEURONTIN) 300 MG capsule Take 1 capsule (300 mg total) by mouth 3 (three) times daily.    glatiramer (COPAXONE) 40 mg/mL injection Inject 40 mg into the skin 3 (three) times a week.    LORazepam (ATIVAN) 0.5 MG tablet Take 1 tablet (0.5 mg total) by mouth every 8 (eight) hours as needed for Anxiety.    methocarbamoL (ROBAXIN) 500 MG Tab Take 500 mg by mouth as needed for Migraine.    nicotine (NICODERM CQ) 21 mg/24 hr Place 1 patch onto the skin once daily.     No current facility-administered medications for this visit.        SOCIAL HISTORY  Social History     Tobacco Use    Smoking status: Current Every Day Smoker     Packs/day: 0.50   Substance Use Topics    Alcohol use: Yes     Frequency: Never     Comment: social    Drug use: Never         ROS:    REVIEW OF SYMPTOMS 7/13/2020   Do you feel abnormally tired on most days? Yes   Do you feel you generally sleep well? Yes   Do you have difficulty controlling your bladder?  No   Do you have difficulty controlling your bowels?  No   Do you have frequent muscle cramps, tightness or spasms in your limbs?  Yes   Do you have new visual symptoms?  No   Do you have worsening difficulty with your memory or thinking? No   Do you have worsening symptoms of anxiety or depression?  No   For patients who walk, Do you  have more difficulty walking?  No   Have you fallen since your last visit?  No   For patients who use wheelchairs: Do you have any skin wounds or breakdown? No   Do you have difficulty using your hands?  No   Do you have shooting or burning pain? Yes   Do you have difficulty with sexual function?  No   If you are sexually active, are you using birth control? Y/N  N/A No   Do you often choke when swallowing liquids or solid food?  No   Do you experience worsening symptoms when overheated? Yes   Do you need any new equipment such as a wheelchair, walker or shower chair? No   Do you receive co-pay financial assistance for your principal MS medicine? Yes   Would you be interested in participating in an MS research trial in the future? Yes   For patients on Gilenya, Tecfidera, Aubagio, Rituxan, Ocrevus, Tysabri, Lemtrada or Methotrexate, are you aware that you should NOT receive live virus vaccines?  No   Do you feel you have adequate family/friend support?  Yes   Do you have health insurance?   Yes   Are you currently employed? Yes   Do you receive SSDI/SSI?  Not Applicable   Do you use marijuana or cannabis products? No   Have you been diagnosed with a urinary tract infection since your last visit here? No   Have you been diagnosed with a respiratory tract infection since your last visit here? No   Have you been to the emergency room since your last visit here? No   Have you been hospitalized since your last visit here?  No                Objective:        1. 25 foot timed walk:  Timed 25 Foot Walk: 7/15/2020 8/4/2020   Did patient wear an AFO? No No   Was assistive device used? No No   Time for 25 Foot Walk (seconds) 5.3 4.35   Time for 25 Foot Walk (seconds) - 4.06       Neurologic Exam     Mental Status   Oriented to person, place, and time.   Speech: speech is normal   Level of consciousness: alert  Normal comprehension.     Motor Exam MMT not performed due to nature of video visit; however, full AROM noted UE's  and LE's      Gait, Coordination, and Reflexes     Gait  Gait: normal    Coordination   Romberg: negative  Patient able to toe walk, march in place-able to lift knees to abdomen, full squat         Imaging:       Results for orders placed during the hospital encounter of 08/05/20   MRI Brain Demyelinating W W/O Contrast    Impression Scattered small T2/FLAIR hyperintensities within the supratentorial white matter compatible with given history of multiple sclerosis.  New enhancing subcentimeter lesion within the body of the right corpus callosum compatible with active demyelination.  Additional details above.    Partially visualized right lateral T2/STIR hyperintensity at C6-C7 which demonstrates some postcontrast enhancement.  Lesion new from recent MRI cervical spine and concerning for active demyelination.    This report was flagged in Epic as abnormal.    Electronically signed by resident: Josemanuel Graf  Date:    08/06/2020  Time:    09:01    Electronically signed by: Marty De Oliveira MD  Date:    08/06/2020  Time:    09:31     Results for orders placed during the hospital encounter of 06/10/20   MRI Cervical Spine Demyelinating W W/O Contrast    Impression Examination mildly degraded by patient motion artifact.    2 solidly enhancing lesions in the supratentorial white matter as above.  Two additional subcentimeter nonenhancing lesions elsewhere.  Although not entirely specific, findings concerning for demyelinating disease (such as multiple sclerosis) with areas of active demyelination.  Lymphoma or other process not entirely excluded.  Clinical correlation required with follow-up suggested    Cervical spine appears within normal limits.  No cervical cord lesions identified.    This report was flagged in Epic as abnormal.      Electronically signed by: Levon Sierra MD  Date:    06/11/2020  Time:    10:13     Results for orders placed during the hospital encounter of 08/05/20   MRI Thoracic Spine Demyelinating W  W/O Contrast    Impression Scattered small T2/FLAIR hyperintensities within the supratentorial white matter compatible with given history of multiple sclerosis.  New enhancing subcentimeter lesion within the body of the right corpus callosum compatible with active demyelination.  Additional details above.    Partially visualized right lateral T2/STIR hyperintensity at C6-C7 which demonstrates some postcontrast enhancement.  Lesion new from recent MRI cervical spine and concerning for active demyelination.    This report was flagged in Epic as abnormal.    Electronically signed by resident: Josemanuel Graf  Date:    08/06/2020  Time:    09:01    Electronically signed by: Marty De Oliveira MD  Date:    08/06/2020  Time:    09:31         Labs:     Lab Results   Component Value Date    TVZIFNIT71MO 18 (L) 06/11/2020     Lab Results   Component Value Date    JCVINDEX 0.66 (H) 06/24/2020    JCVANTIBODY POSITIVE (A) 06/24/2020     Lab Results   Component Value Date    MQ7MZEOK 82.3 06/24/2020    ABSOLUTECD3 2529.0 (H) 06/24/2020    CB9QFQCL 15.6 06/24/2020    ABSOLUTECD8 481.0 06/24/2020    KK0SIUIX 66.1 (H) 06/24/2020    ABSOLUTECD4 2031.0 (H) 06/24/2020    LABCD48 4.22 (H) 06/24/2020     Lab Results   Component Value Date    WBC 10.63 06/12/2020    RBC 4.54 06/12/2020    HGB 13.7 06/12/2020    HCT 41.5 06/12/2020    MCV 91 06/12/2020    MCH 30.2 06/12/2020    MCHC 33.0 06/12/2020    RDW 12.8 06/12/2020     06/12/2020    MPV 11.3 06/12/2020    GRAN 6.5 06/12/2020    GRAN 61.4 06/12/2020    LYMPH 3.3 06/12/2020    LYMPH 31.4 06/12/2020    MONO 0.6 06/12/2020    MONO 5.2 06/12/2020    EOS 0.1 06/12/2020    BASO 0.03 06/12/2020    EOSINOPHIL 1.1 06/12/2020    BASOPHIL 0.3 06/12/2020     Sodium   Date Value Ref Range Status   06/12/2020 138 136 - 145 mmol/L Final     Potassium   Date Value Ref Range Status   06/12/2020 4.2 3.5 - 5.1 mmol/L Final     Chloride   Date Value Ref Range Status   06/12/2020 107 95 - 110 mmol/L Final      CO2   Date Value Ref Range Status   06/12/2020 21 (L) 23 - 29 mmol/L Final     Glucose   Date Value Ref Range Status   06/12/2020 95 70 - 110 mg/dL Final     BUN, Bld   Date Value Ref Range Status   06/12/2020 9 6 - 20 mg/dL Final     Creatinine   Date Value Ref Range Status   06/12/2020 0.7 0.5 - 1.4 mg/dL Final     Calcium   Date Value Ref Range Status   06/12/2020 9.3 8.7 - 10.5 mg/dL Final     Total Protein   Date Value Ref Range Status   06/12/2020 6.7 6.0 - 8.4 g/dL Final     Albumin   Date Value Ref Range Status   06/12/2020 3.5 3.5 - 5.2 g/dL Final     Total Bilirubin   Date Value Ref Range Status   06/12/2020 0.3 0.1 - 1.0 mg/dL Final     Comment:     For infants and newborns, interpretation of results should be based  on gestational age, weight and in agreement with clinical  observations.  Premature Infant recommended reference ranges:  Up to 24 hours.............<8.0 mg/dL  Up to 48 hours............<12.0 mg/dL  3-5 days..................<15.0 mg/dL  6-29 days.................<15.0 mg/dL       Alkaline Phosphatase   Date Value Ref Range Status   06/12/2020 85 55 - 135 U/L Final     AST   Date Value Ref Range Status   06/12/2020 17 10 - 40 U/L Final     ALT   Date Value Ref Range Status   06/12/2020 23 10 - 44 U/L Final     Anion Gap   Date Value Ref Range Status   06/12/2020 10 8 - 16 mmol/L Final     eGFR if    Date Value Ref Range Status   06/12/2020 >60 >60 mL/min/1.73 m^2 Final     eGFR if non    Date Value Ref Range Status   06/12/2020 >60 >60 mL/min/1.73 m^2 Final     Comment:     Calculation used to obtain the estimated glomerular filtration  rate (eGFR) is the CKD-EPI equation.        Lab Results   Component Value Date    HEPBSAG Negative 08/10/2020    HEPBSAB Negative 08/10/2020    HEPBCAB Negative 08/10/2020           MS Impression and Plan:     NEURO MULTIPLE SCLEROSIS IMPRESSION:   MS Status:     MRI Progression:  Worsened (new lesions)  Plan:     DMT:   Switch Disease Modifying therapy    Switch Disease Modifying Therapy FROM:  None    TO:  Ocrelizumab     Patient presents again today with c/o of worsening MS(sensory symptoms), with new symptoms in R LE and UE. Concern for relapse as she is not longer on DMT -waiting on approval of appeal for Ocrevus. She needs to proceed with Ocrevus ASAP, hesitant to give more steroids as she has had several rounds of high dose steroids in the past several months. She is at risk for worsening symptoms and progression of disease at present.           Our video visit today lasted 45 minutes(see above for face to face). Over 50% of this visit included discussion of the treatment plan/medication changes/symptom management/exam findings/imaging /coordination of care.     Problem List Items Addressed This Visit        Neuro    Multiple sclerosis - Primary      Other Visit Diagnoses     Counseling regarding goals of care        Paresthesia        Vitamin D insufficiency              Follow up in about 3 months (around 12/22/2020) for follow up with me.  Patient agreed to POC today.    Attending, Dr. Agrawal, was available during today's encounter.     Farida Noyola PA-C  [facility]

## 2020-09-22 NOTE — LETTER
September 22, 2020      [facility]  [address]  [facility] [address]  Phone: [phone]       Patient: Cari Barillas   YOB: 1985  Date of Visit: 09/22/2020    To Whom It May Concern:    Ángel Barillas  was at [facility] on 09/22/2020. She may return to work/school on 9/23/2020 with no restrictions. If you have any questions or concerns, or if I can be of further assistance, please do not hesitate to contact me.    Sincerely,            Farida Noyola PA-C

## 2020-09-22 NOTE — Clinical Note
Please send her a work excuse for today's visit. I will have other items after I speak with Dr. Oro.

## 2020-09-23 ENCOUNTER — TELEPHONE (OUTPATIENT)
Dept: SMOKING CESSATION | Facility: CLINIC | Age: 35
End: 2020-09-23

## 2020-09-23 ENCOUNTER — TELEPHONE (OUTPATIENT)
Dept: NEUROLOGY | Facility: CLINIC | Age: 35
End: 2020-09-23

## 2020-09-23 NOTE — TELEPHONE ENCOUNTER
----- Message from Basilio Antunez sent at 9/23/2020  1:05 PM CDT -----  Contact: Laurent ortega/ Denis @ [phone]  Laurent wants to confirm if there's any medical records that need to be reviewed, regarding the appeal they received

## 2020-09-25 ENCOUNTER — TELEPHONE (OUTPATIENT)
Dept: NEUROLOGY | Facility: CLINIC | Age: 35
End: 2020-09-25

## 2020-09-25 DIAGNOSIS — G35 MS (MULTIPLE SCLEROSIS): ICD-10-CM

## 2020-09-25 RX ORDER — LORAZEPAM 0.5 MG/1
0.5 TABLET ORAL EVERY 8 HOURS PRN
Qty: 10 TABLET | Refills: 0 | Status: CANCELLED | OUTPATIENT
Start: 2020-09-25

## 2020-09-28 RX ORDER — LORAZEPAM 0.5 MG/1
0.5 TABLET ORAL EVERY 8 HOURS PRN
Qty: 10 TABLET | Refills: 0 | Status: SHIPPED | OUTPATIENT
Start: 2020-09-28 | End: 2023-11-20

## 2020-10-01 ENCOUNTER — PATIENT MESSAGE (OUTPATIENT)
Dept: NEUROLOGY | Facility: CLINIC | Age: 35
End: 2020-10-01

## 2020-10-01 DIAGNOSIS — G35 MULTIPLE SCLEROSIS: Primary | ICD-10-CM

## 2020-10-02 ENCOUNTER — LAB VISIT (OUTPATIENT)
Dept: URGENT CARE | Facility: CLINIC | Age: 35
End: 2020-10-02
Payer: COMMERCIAL

## 2020-10-02 DIAGNOSIS — G35 MULTIPLE SCLEROSIS: ICD-10-CM

## 2020-10-02 PROCEDURE — 99211 PR OFFICE/OUTPT VISIT, EST, LEVL I: ICD-10-PCS | Mod: S$GLB,,, | Performed by: FAMILY MEDICINE

## 2020-10-02 PROCEDURE — 99211 OFF/OP EST MAY X REQ PHY/QHP: CPT | Mod: S$GLB,,, | Performed by: FAMILY MEDICINE

## 2020-10-02 PROCEDURE — U0003 INFECTIOUS AGENT DETECTION BY NUCLEIC ACID (DNA OR RNA); SEVERE ACUTE RESPIRATORY SYNDROME CORONAVIRUS 2 (SARS-COV-2) (CORONAVIRUS DISEASE [COVID-19]), AMPLIFIED PROBE TECHNIQUE, MAKING USE OF HIGH THROUGHPUT TECHNOLOGIES AS DESCRIBED BY CMS-2020-01-R: HCPCS

## 2020-10-02 NOTE — PROGRESS NOTES
Subjective:       Patient ID: Cari Barillas is a 34 y.o. female.    Chief Complaint: Preprocedure Covid Swab    Preprocedure covid swab completed by [facility].   Labeled with purple dot.     ROS     Objective:      Physical Exam    Assessment:       1. Multiple sclerosis        Plan:                   No follow-ups on file.

## 2020-10-03 LAB — SARS-COV-2 RNA RESP QL NAA+PROBE: NOT DETECTED

## 2020-10-05 ENCOUNTER — INFUSION (OUTPATIENT)
Dept: INFUSION THERAPY | Facility: HOSPITAL | Age: 35
End: 2020-10-05
Payer: COMMERCIAL

## 2020-10-05 VITALS
HEART RATE: 85 BPM | SYSTOLIC BLOOD PRESSURE: 112 MMHG | RESPIRATION RATE: 20 BRPM | OXYGEN SATURATION: 97 % | TEMPERATURE: 98 F | DIASTOLIC BLOOD PRESSURE: 69 MMHG

## 2020-10-05 DIAGNOSIS — G35 MULTIPLE SCLEROSIS: Primary | ICD-10-CM

## 2020-10-05 PROCEDURE — 96375 TX/PRO/DX INJ NEW DRUG ADDON: CPT

## 2020-10-05 PROCEDURE — 96367 TX/PROPH/DG ADDL SEQ IV INF: CPT

## 2020-10-05 PROCEDURE — 63600175 PHARM REV CODE 636 W HCPCS: Mod: TB | Performed by: PSYCHIATRY & NEUROLOGY

## 2020-10-05 PROCEDURE — 96415 CHEMO IV INFUSION ADDL HR: CPT

## 2020-10-05 PROCEDURE — 96413 CHEMO IV INFUSION 1 HR: CPT

## 2020-10-05 PROCEDURE — 25000003 PHARM REV CODE 250: Performed by: PSYCHIATRY & NEUROLOGY

## 2020-10-05 PROCEDURE — S0028 INJECTION, FAMOTIDINE, 20 MG: HCPCS | Performed by: PSYCHIATRY & NEUROLOGY

## 2020-10-05 RX ORDER — HEPARIN 100 UNIT/ML
500 SYRINGE INTRAVENOUS
Status: CANCELLED | OUTPATIENT
Start: 2020-10-19

## 2020-10-05 RX ORDER — ACETAMINOPHEN 500 MG
1000 TABLET ORAL
Status: COMPLETED | OUTPATIENT
Start: 2020-10-05 | End: 2020-10-05

## 2020-10-05 RX ORDER — EPINEPHRINE 0.3 MG/.3ML
0.3 INJECTION SUBCUTANEOUS
Status: DISCONTINUED | OUTPATIENT
Start: 2020-10-05 | End: 2020-10-05 | Stop reason: HOSPADM

## 2020-10-05 RX ORDER — DIPHENHYDRAMINE HYDROCHLORIDE 50 MG/ML
50 INJECTION INTRAMUSCULAR; INTRAVENOUS
Status: DISCONTINUED | OUTPATIENT
Start: 2020-10-05 | End: 2020-10-05 | Stop reason: HOSPADM

## 2020-10-05 RX ORDER — ACETAMINOPHEN 500 MG
1000 TABLET ORAL
Status: CANCELLED | OUTPATIENT
Start: 2020-10-19

## 2020-10-05 RX ORDER — FAMOTIDINE 10 MG/ML
20 INJECTION INTRAVENOUS
Status: CANCELLED | OUTPATIENT
Start: 2020-10-19

## 2020-10-05 RX ORDER — FAMOTIDINE 10 MG/ML
20 INJECTION INTRAVENOUS
Status: COMPLETED | OUTPATIENT
Start: 2020-10-05 | End: 2020-10-05

## 2020-10-05 RX ORDER — DIPHENHYDRAMINE HYDROCHLORIDE 50 MG/ML
50 INJECTION INTRAMUSCULAR; INTRAVENOUS
Status: CANCELLED | OUTPATIENT
Start: 2020-10-19

## 2020-10-05 RX ORDER — SODIUM CHLORIDE 0.9 % (FLUSH) 0.9 %
10 SYRINGE (ML) INJECTION
Status: CANCELLED | OUTPATIENT
Start: 2020-10-19

## 2020-10-05 RX ORDER — EPINEPHRINE 0.3 MG/.3ML
0.3 INJECTION SUBCUTANEOUS
Status: CANCELLED | OUTPATIENT
Start: 2020-10-19

## 2020-10-05 RX ADMIN — ACETAMINOPHEN 1000 MG: 500 TABLET, FILM COATED ORAL at 09:10

## 2020-10-05 RX ADMIN — DEXTROSE MONOHYDRATE: 50 INJECTION, SOLUTION INTRAVENOUS at 09:10

## 2020-10-05 RX ADMIN — FAMOTIDINE 20 MG: 10 INJECTION INTRAVENOUS at 10:10

## 2020-10-05 RX ADMIN — SODIUM CHLORIDE: 0.9 INJECTION, SOLUTION INTRAVENOUS at 09:10

## 2020-10-05 RX ADMIN — OCRELIZUMAB 300 MG: 300 INJECTION INTRAVENOUS at 10:10

## 2020-10-05 RX ADMIN — DIPHENHYDRAMINE HYDROCHLORIDE 50 MG: 50 INJECTION, SOLUTION INTRAMUSCULAR; INTRAVENOUS at 09:10

## 2020-10-05 NOTE — PLAN OF CARE
1405p Tolerated Ocrevus without difficulty. VSS thru out treatment and observed for one hr post. No complaints voiced.  Instructed to notify MD with any concerns or problems. Pt verbalized understanding.

## 2020-10-09 ENCOUNTER — PATIENT MESSAGE (OUTPATIENT)
Dept: NEUROLOGY | Facility: CLINIC | Age: 35
End: 2020-10-09

## 2020-10-09 DIAGNOSIS — R53.82 CHRONIC FATIGUE: ICD-10-CM

## 2020-10-09 DIAGNOSIS — G35 MULTIPLE SCLEROSIS: Primary | ICD-10-CM

## 2020-10-12 ENCOUNTER — PATIENT MESSAGE (OUTPATIENT)
Dept: NEUROLOGY | Facility: CLINIC | Age: 35
End: 2020-10-12

## 2020-10-12 ENCOUNTER — CLINICAL SUPPORT (OUTPATIENT)
Dept: INFECTIOUS DISEASES | Facility: CLINIC | Age: 35
End: 2020-10-12
Payer: COMMERCIAL

## 2020-10-12 DIAGNOSIS — G35 MULTIPLE SCLEROSIS: ICD-10-CM

## 2020-10-12 PROCEDURE — 90739 HEPATITIS B (RECOMBINANT) ADJUVANTED, 2 DOSE: ICD-10-PCS | Mod: S$GLB,,, | Performed by: INTERNAL MEDICINE

## 2020-10-12 PROCEDURE — 99999 PR PBB SHADOW E&M-EST. PATIENT-LVL II: ICD-10-PCS | Mod: PBBFAC,,,

## 2020-10-12 PROCEDURE — 90472 PNEUMOCOCCAL POLYSACCHARIDE VACCINE 23-VALENT =>2YO SQ IM: ICD-10-PCS | Mod: S$GLB,,, | Performed by: INTERNAL MEDICINE

## 2020-10-12 PROCEDURE — 90471 IMMUNIZATION ADMIN: CPT | Mod: S$GLB,,, | Performed by: INTERNAL MEDICINE

## 2020-10-12 PROCEDURE — 90472 IMMUNIZATION ADMIN EACH ADD: CPT | Mod: S$GLB,,, | Performed by: INTERNAL MEDICINE

## 2020-10-12 PROCEDURE — 90732 PNEUMOCOCCAL POLYSACCHARIDE VACCINE 23-VALENT =>2YO SQ IM: ICD-10-PCS | Mod: S$GLB,,, | Performed by: INTERNAL MEDICINE

## 2020-10-12 PROCEDURE — 90715 TDAP VACCINE GREATER THAN OR EQUAL TO 7YO IM: ICD-10-PCS | Mod: S$GLB,,, | Performed by: INTERNAL MEDICINE

## 2020-10-12 PROCEDURE — 90732 PPSV23 VACC 2 YRS+ SUBQ/IM: CPT | Mod: S$GLB,,, | Performed by: INTERNAL MEDICINE

## 2020-10-12 PROCEDURE — 90471 HEPATITIS B (RECOMBINANT) ADJUVANTED, 2 DOSE: ICD-10-PCS | Mod: S$GLB,,, | Performed by: INTERNAL MEDICINE

## 2020-10-12 PROCEDURE — 99999 PR PBB SHADOW E&M-EST. PATIENT-LVL II: CPT | Mod: PBBFAC,,,

## 2020-10-12 PROCEDURE — 90739 HEPB VACC 2/4 DOSE ADULT IM: CPT | Mod: S$GLB,,, | Performed by: INTERNAL MEDICINE

## 2020-10-12 PROCEDURE — 90715 TDAP VACCINE 7 YRS/> IM: CPT | Mod: S$GLB,,, | Performed by: INTERNAL MEDICINE

## 2020-10-12 NOTE — PROGRESS NOTES
Patient received 2nd heplisav B IM to the right deltoid, right side, and Pneumovax Im to the right deltoid left side.  Also received Tdap IM to the left deltoid.  Tolerated well and left in NAD

## 2020-10-13 ENCOUNTER — TELEPHONE (OUTPATIENT)
Dept: ADMINISTRATIVE | Facility: OTHER | Age: 35
End: 2020-10-13

## 2020-10-14 ENCOUNTER — OFFICE VISIT (OUTPATIENT)
Dept: NEUROLOGY | Facility: CLINIC | Age: 35
End: 2020-10-14
Payer: COMMERCIAL

## 2020-10-14 VITALS — BODY MASS INDEX: 32.96 KG/M2 | WEIGHT: 192 LBS

## 2020-10-14 DIAGNOSIS — D84.821 IMMUNOSUPPRESSION DUE TO DRUG THERAPY: ICD-10-CM

## 2020-10-14 DIAGNOSIS — R53.82 CHRONIC FATIGUE: ICD-10-CM

## 2020-10-14 DIAGNOSIS — M79.2 NEUROPATHIC PAIN: ICD-10-CM

## 2020-10-14 DIAGNOSIS — G35 MULTIPLE SCLEROSIS: Primary | ICD-10-CM

## 2020-10-14 DIAGNOSIS — E55.9 VITAMIN D INSUFFICIENCY: ICD-10-CM

## 2020-10-14 DIAGNOSIS — Z79.899 IMMUNOSUPPRESSION DUE TO DRUG THERAPY: ICD-10-CM

## 2020-10-14 DIAGNOSIS — F41.9 ANXIETY: ICD-10-CM

## 2020-10-14 PROCEDURE — 99214 PR OFFICE/OUTPT VISIT, EST, LEVL IV, 30-39 MIN: ICD-10-PCS | Mod: 95,,, | Performed by: PHYSICIAN ASSISTANT

## 2020-10-14 PROCEDURE — 3008F PR BODY MASS INDEX (BMI) DOCUMENTED: ICD-10-PCS | Mod: CPTII,,, | Performed by: PHYSICIAN ASSISTANT

## 2020-10-14 PROCEDURE — 3008F BODY MASS INDEX DOCD: CPT | Mod: CPTII,,, | Performed by: PHYSICIAN ASSISTANT

## 2020-10-14 PROCEDURE — 99214 OFFICE O/P EST MOD 30 MIN: CPT | Mod: 95,,, | Performed by: PHYSICIAN ASSISTANT

## 2020-10-14 NOTE — PATIENT INSTRUCTIONS
Gabapentin: 7am, 12pm, 5pm, 10pm for 3 days and if this is tolerable but does not control the pain then increase night time dose to 600mg and keep other same. Please update me.

## 2020-10-14 NOTE — PROGRESS NOTES
Subjective:          Patient ID: Cari Barillas is a 35 y.o. female who presents today for a routine video visit for MS.      MS HPI:  · DMT: ocrelizumab first 300mg infusion 10/5/2020  · Side effects from DMT? No-some fatigue after infusion otherwise no issues  · Taking vitamin D3 as recommended? Yes - 5,000IU/d   · Referral placed to sleep medicine to evaluate significant fatigue(fatigue upon waking)-no appt yet but sleep medicine has reached out for scheduling  · Continues to have tingling pain--L LE, L UE and R foot-currently taking gabapentin 300mg TID(9:30AM, 4-6pm, 10:30PM  · Patient is currently seeing counselor-Franca Murillo-psychologist --every other week      The patient location is: home(patient lying in bed)  The chief complaint leading to consultation is: MS/neuropathic pain    Visit type: audiovisual    Face to Face time with patient: 17 minutes  30 minutes of total time spent on the encounter, which includes face to face time and non-face to face time preparing to see the patient (eg, review of tests), Obtaining and/or reviewing separately obtained history, Documenting clinical information in the electronic or other health record, Independently interpreting results (not separately reported) and communicating results to the patient/family/caregiver, or Care coordination (not separately reported).         Each patient to whom he or she provides medical services by telemedicine is:  (1) informed of the relationship between the physician and patient and the respective role of any other health care provider with respect to management of the patient; and (2) notified that he or she may decline to receive medical services by telemedicine and may withdraw from such care at any time.    Notes:     Medications:  Current Outpatient Medications   Medication Sig    citalopram hydrobromide (CITALOPRAM ORAL) Take 40 mg by mouth once daily.     cyanocobalamin (VITAMIN B-12) 1000 MCG tablet Take 1 tablet  (1,000 mcg total) by mouth once daily.    gabapentin (NEURONTIN) 300 MG capsule Take 1 capsule (300 mg total) by mouth 3 (three) times daily.    glatiramer (COPAXONE) 40 mg/mL injection Inject 40 mg into the skin 3 (three) times a week.    LORazepam (ATIVAN) 0.5 MG tablet Take 1 tablet (0.5 mg total) by mouth every 8 (eight) hours as needed for Anxiety.    magnesium 30 mg Tab Take 250 mg by mouth once.    methocarbamoL (ROBAXIN) 500 MG Tab Take 500 mg by mouth as needed for Migraine.    vitamin D (VITAMIN D3) 1000 units Tab Take 5,000 Units by mouth once daily.     No current facility-administered medications for this visit.        SOCIAL HISTORY  Social History     Tobacco Use    Smoking status: Current Every Day Smoker     Packs/day: 0.50   Substance Use Topics    Alcohol use: Yes     Frequency: Never     Comment: social    Drug use: Never       Living arrangements - the patient lives alone.    ROS:  As above             Objective:        1. 25 foot timed walk:  Timed 25 Foot Walk: 7/15/2020 8/4/2020   Did patient wear an AFO? No No   Was assistive device used? No No   Time for 25 Foot Walk (seconds) 5.3 4.35   Time for 25 Foot Walk (seconds) - 4.06       Neurologic Exam    Deferred today  Imaging:       Results for orders placed during the hospital encounter of 08/05/20   MRI Brain Demyelinating W W/O Contrast    Impression Scattered small T2/FLAIR hyperintensities within the supratentorial white matter compatible with given history of multiple sclerosis.  New enhancing subcentimeter lesion within the body of the right corpus callosum compatible with active demyelination.  Additional details above.    Partially visualized right lateral T2/STIR hyperintensity at C6-C7 which demonstrates some postcontrast enhancement.  Lesion new from recent MRI cervical spine and concerning for active demyelination.    This report was flagged in Epic as abnormal.    Electronically signed by resident: Josemanuel  Love  Date:    08/06/2020  Time:    09:01    Electronically signed by: Marty De Oliveira MD  Date:    08/06/2020  Time:    09:31     Results for orders placed during the hospital encounter of 06/10/20   MRI Cervical Spine Demyelinating W W/O Contrast    Impression Examination mildly degraded by patient motion artifact.    2 solidly enhancing lesions in the supratentorial white matter as above.  Two additional subcentimeter nonenhancing lesions elsewhere.  Although not entirely specific, findings concerning for demyelinating disease (such as multiple sclerosis) with areas of active demyelination.  Lymphoma or other process not entirely excluded.  Clinical correlation required with follow-up suggested    Cervical spine appears within normal limits.  No cervical cord lesions identified.    This report was flagged in Epic as abnormal.      Electronically signed by: Levon Sierra MD  Date:    06/11/2020  Time:    10:13     Results for orders placed during the hospital encounter of 08/05/20   MRI Thoracic Spine Demyelinating W W/O Contrast    Impression Scattered small T2/FLAIR hyperintensities within the supratentorial white matter compatible with given history of multiple sclerosis.  New enhancing subcentimeter lesion within the body of the right corpus callosum compatible with active demyelination.  Additional details above.    Partially visualized right lateral T2/STIR hyperintensity at C6-C7 which demonstrates some postcontrast enhancement.  Lesion new from recent MRI cervical spine and concerning for active demyelination.    This report was flagged in Epic as abnormal.    Electronically signed by resident: Josemanuel Graf  Date:    08/06/2020  Time:    09:01    Electronically signed by: Marty De Oliveira MD  Date:    08/06/2020  Time:    09:31         Labs:     Lab Results   Component Value Date    VUWTHUYG98XW 18 (L) 06/11/2020     Lab Results   Component Value Date    JCVINDEX 0.66 (H) 06/24/2020    JCVANTIBODY POSITIVE  (A) 06/24/2020     Lab Results   Component Value Date    AF1WXSMX 82.3 06/24/2020    ABSOLUTECD3 2529.0 (H) 06/24/2020    CW5SDYWK 15.6 06/24/2020    ABSOLUTECD8 481.0 06/24/2020    GR8FZNVA 66.1 (H) 06/24/2020    ABSOLUTECD4 2031.0 (H) 06/24/2020    LABCD48 4.22 (H) 06/24/2020     Lab Results   Component Value Date    WBC 10.63 06/12/2020    RBC 4.54 06/12/2020    HGB 13.7 06/12/2020    HCT 41.5 06/12/2020    MCV 91 06/12/2020    MCH 30.2 06/12/2020    MCHC 33.0 06/12/2020    RDW 12.8 06/12/2020     06/12/2020    MPV 11.3 06/12/2020    GRAN 6.5 06/12/2020    GRAN 61.4 06/12/2020    LYMPH 3.3 06/12/2020    LYMPH 31.4 06/12/2020    MONO 0.6 06/12/2020    MONO 5.2 06/12/2020    EOS 0.1 06/12/2020    BASO 0.03 06/12/2020    EOSINOPHIL 1.1 06/12/2020    BASOPHIL 0.3 06/12/2020     Sodium   Date Value Ref Range Status   06/12/2020 138 136 - 145 mmol/L Final     Potassium   Date Value Ref Range Status   06/12/2020 4.2 3.5 - 5.1 mmol/L Final     Chloride   Date Value Ref Range Status   06/12/2020 107 95 - 110 mmol/L Final     CO2   Date Value Ref Range Status   06/12/2020 21 (L) 23 - 29 mmol/L Final     Glucose   Date Value Ref Range Status   06/12/2020 95 70 - 110 mg/dL Final     BUN, Bld   Date Value Ref Range Status   06/12/2020 9 6 - 20 mg/dL Final     Creatinine   Date Value Ref Range Status   06/12/2020 0.7 0.5 - 1.4 mg/dL Final     Calcium   Date Value Ref Range Status   06/12/2020 9.3 8.7 - 10.5 mg/dL Final     Total Protein   Date Value Ref Range Status   06/12/2020 6.7 6.0 - 8.4 g/dL Final     Albumin   Date Value Ref Range Status   06/12/2020 3.5 3.5 - 5.2 g/dL Final     Total Bilirubin   Date Value Ref Range Status   06/12/2020 0.3 0.1 - 1.0 mg/dL Final     Comment:     For infants and newborns, interpretation of results should be based  on gestational age, weight and in agreement with clinical  observations.  Premature Infant recommended reference ranges:  Up to 24 hours.............<8.0 mg/dL  Up to 48  hours............<12.0 mg/dL  3-5 days..................<15.0 mg/dL  6-29 days.................<15.0 mg/dL       Alkaline Phosphatase   Date Value Ref Range Status   06/12/2020 85 55 - 135 U/L Final     AST   Date Value Ref Range Status   06/12/2020 17 10 - 40 U/L Final     ALT   Date Value Ref Range Status   06/12/2020 23 10 - 44 U/L Final     Anion Gap   Date Value Ref Range Status   06/12/2020 10 8 - 16 mmol/L Final     eGFR if    Date Value Ref Range Status   06/12/2020 >60 >60 mL/min/1.73 m^2 Final     eGFR if non    Date Value Ref Range Status   06/12/2020 >60 >60 mL/min/1.73 m^2 Final     Comment:     Calculation used to obtain the estimated glomerular filtration  rate (eGFR) is the CKD-EPI equation.        Lab Results   Component Value Date    HEPBSAG Negative 08/10/2020    HEPBSAB Negative 08/10/2020    HEPBCAB Negative 08/10/2020           MS Impression and Plan:     NEURO MULTIPLE SCLEROSIS IMPRESSION:   Plan:     DMT:  No change in management    DMT comment:  Patient has received first dose of Ocrevus(300mg) and has second dose scheduled next week. Tolerated first infusion with mild symptoms(fatigue).     Symptom Management:  Implement change in symptom management    Implement Change in Symptom Management:  Pain and Sleep (Increase gabapentin to QID(300mg) for 3 days, then if needed increase HS dose to 600mg:advise to f/u with sleep medicine to schedule appt)     Next Imaging Due: 4/14/2021     Next Labs Due: 3/14/2021         Our video visit today lasted 30 minutes in total(please see above for face to face time).  Over 50% of this visit included discussion of the treatment plan/medication changes/symptom management/exam findings/imaging results/coordination of care.     Problem List Items Addressed This Visit        Neuro    Multiple sclerosis - Primary     First dose of Ocrevus completed(300mg) and tolerated well with only mild fatigue afterward         Neuropathic pain      Adjusted gabapentin dose to 300mg QID and after 3 days if she continues to have increased NP pain HS she will increase HS dose to 600mg.            Psychiatric    Anxiety     Ativan 0.5mg 2-4 times per week currently  Patient has counselor she sees every other week(external to [facility])-I encouraged increased frequency if needed            Endocrine    Vitamin D insufficiency     Continue Daily Vit D3-5,000IU/d            Other    Immunosuppression due to drug therapy    Chronic fatigue     Sleep medicine referral placed recently-awaiting appt(sleep medicine has reached out for scheduling)               Follow up in about 3 months (around 1/14/2021) for Dr. Oro.  Patient agreed to POC today.    Attending, Dr. Oro, was available during today's encounter.     Farida Noyola PA-C  [facility]

## 2020-10-14 NOTE — ASSESSMENT & PLAN NOTE
Adjusted gabapentin dose to 300mg QID and after 3 days if she continues to have increased NP pain HS she will increase HS dose to 600mg.

## 2020-10-14 NOTE — ASSESSMENT & PLAN NOTE
Sleep medicine referral placed recently-awaiting appt(sleep medicine has reached out for scheduling)

## 2020-10-14 NOTE — ASSESSMENT & PLAN NOTE
Ativan 0.5mg 2-4 times per week currently  Patient has counselor she sees every other week(external to [facility])-I encouraged increased frequency if needed

## 2020-10-15 ENCOUNTER — DOCUMENTATION ONLY (OUTPATIENT)
Dept: REHABILITATION | Facility: HOSPITAL | Age: 35
End: 2020-10-15

## 2020-10-15 NOTE — PROGRESS NOTES
OUTPATIENT PHYSICAL THERAPY DISCHARGE SUMMARY     Name: Cari Barillas  Clinic Number: [id number]    Physician Orders: PT Eval and Treat    Medical Diagnosis from Referral: G35 (ICD-10-CM) - Multiple sclerosis   Evaluation Date: 2020  Authorization Period Expiration: 2020   Plan of Care Expiration: 2020     Date of Last visit: 2020  Date of Discharge Note:  10/15/2020  Total Visits Received: 2  Missed Visits: 2    ASSESSMENT   Cari Barillas only presented for 1 follow up visits after the evaluation and then cancelled/no-showed the remaining PT visits scheduled. Multiple attempts to call patient to reschedule. The patient did not schedule any further sessions and the PT POC is now . Due to such limited PT attendance and unexpected pt self discharge, no progress noted and no final discharge measures were assessed.      Discharge reason : Pt has not re-scheduled further follow-up sessions    PLAN   This patient is discharged from Outpatient Physical Therapy Services.     Shana Terry, PT  10/15/2020

## 2020-10-19 ENCOUNTER — INFUSION (OUTPATIENT)
Dept: INFUSION THERAPY | Facility: HOSPITAL | Age: 35
End: 2020-10-19
Payer: COMMERCIAL

## 2020-10-19 VITALS
BODY MASS INDEX: 32.97 KG/M2 | HEIGHT: 64 IN | RESPIRATION RATE: 18 BRPM | HEART RATE: 75 BPM | SYSTOLIC BLOOD PRESSURE: 120 MMHG | DIASTOLIC BLOOD PRESSURE: 73 MMHG | WEIGHT: 193.13 LBS | TEMPERATURE: 98 F

## 2020-10-19 DIAGNOSIS — G35 MULTIPLE SCLEROSIS: Primary | ICD-10-CM

## 2020-10-19 PROCEDURE — 25000003 PHARM REV CODE 250: Performed by: PSYCHIATRY & NEUROLOGY

## 2020-10-19 PROCEDURE — 96375 TX/PRO/DX INJ NEW DRUG ADDON: CPT

## 2020-10-19 PROCEDURE — 96367 TX/PROPH/DG ADDL SEQ IV INF: CPT

## 2020-10-19 PROCEDURE — 96365 THER/PROPH/DIAG IV INF INIT: CPT

## 2020-10-19 PROCEDURE — 63600175 PHARM REV CODE 636 W HCPCS: Performed by: PSYCHIATRY & NEUROLOGY

## 2020-10-19 PROCEDURE — 96366 THER/PROPH/DIAG IV INF ADDON: CPT

## 2020-10-19 RX ORDER — EPINEPHRINE 0.3 MG/.3ML
0.3 INJECTION SUBCUTANEOUS
Status: CANCELLED | OUTPATIENT
Start: 2021-03-22

## 2020-10-19 RX ORDER — FAMOTIDINE 10 MG/ML
20 INJECTION INTRAVENOUS
Status: CANCELLED | OUTPATIENT
Start: 2021-03-22

## 2020-10-19 RX ORDER — ACETAMINOPHEN 500 MG
1000 TABLET ORAL
Status: COMPLETED | OUTPATIENT
Start: 2020-10-19 | End: 2020-10-19

## 2020-10-19 RX ORDER — DIPHENHYDRAMINE HYDROCHLORIDE 50 MG/ML
50 INJECTION INTRAMUSCULAR; INTRAVENOUS
Status: DISCONTINUED | OUTPATIENT
Start: 2020-10-19 | End: 2020-10-19 | Stop reason: HOSPADM

## 2020-10-19 RX ORDER — SODIUM CHLORIDE 0.9 % (FLUSH) 0.9 %
10 SYRINGE (ML) INJECTION
Status: DISCONTINUED | OUTPATIENT
Start: 2020-10-19 | End: 2020-10-19 | Stop reason: HOSPADM

## 2020-10-19 RX ORDER — SODIUM CHLORIDE 0.9 % (FLUSH) 0.9 %
10 SYRINGE (ML) INJECTION
Status: CANCELLED | OUTPATIENT
Start: 2021-03-22

## 2020-10-19 RX ORDER — HEPARIN 100 UNIT/ML
500 SYRINGE INTRAVENOUS
Status: DISCONTINUED | OUTPATIENT
Start: 2020-10-19 | End: 2020-10-19 | Stop reason: HOSPADM

## 2020-10-19 RX ORDER — DIPHENHYDRAMINE HYDROCHLORIDE 50 MG/ML
50 INJECTION INTRAMUSCULAR; INTRAVENOUS
Status: CANCELLED | OUTPATIENT
Start: 2021-03-22

## 2020-10-19 RX ORDER — HEPARIN 100 UNIT/ML
500 SYRINGE INTRAVENOUS
Status: CANCELLED | OUTPATIENT
Start: 2021-03-22

## 2020-10-19 RX ORDER — FAMOTIDINE 10 MG/ML
20 INJECTION INTRAVENOUS
Status: COMPLETED | OUTPATIENT
Start: 2020-10-19 | End: 2020-10-19

## 2020-10-19 RX ORDER — ACETAMINOPHEN 500 MG
1000 TABLET ORAL
Status: CANCELLED | OUTPATIENT
Start: 2021-03-22

## 2020-10-19 RX ORDER — EPINEPHRINE 0.3 MG/.3ML
0.3 INJECTION SUBCUTANEOUS
Status: DISCONTINUED | OUTPATIENT
Start: 2020-10-19 | End: 2020-10-19 | Stop reason: HOSPADM

## 2020-10-19 RX ADMIN — DIPHENHYDRAMINE HYDROCHLORIDE 50 MG: 50 INJECTION INTRAMUSCULAR; INTRAVENOUS at 09:10

## 2020-10-19 RX ADMIN — OCRELIZUMAB 300 MG: 300 INJECTION INTRAVENOUS at 10:10

## 2020-10-19 RX ADMIN — SODIUM CHLORIDE: 0.9 INJECTION, SOLUTION INTRAVENOUS at 09:10

## 2020-10-19 RX ADMIN — FAMOTIDINE 20 MG: 10 INJECTION INTRAVENOUS at 09:10

## 2020-10-19 RX ADMIN — ACETAMINOPHEN 1000 MG: 500 TABLET ORAL at 09:10

## 2020-10-19 RX ADMIN — DEXTROSE: 50 INJECTION, SOLUTION INTRAVENOUS at 09:10

## 2020-10-19 NOTE — PLAN OF CARE
Problem: Adult Inpatient Plan of Care  Goal: Optimal Comfort and Wellbeing  Intervention: Provide Person-Centered Care  Flowsheets (Taken 10/19/2020 1008)  Trust Relationship/Rapport:   care explained   reassurance provided   choices provided   emotional support provided   thoughts/feelings acknowledged   empathic listening provided   questions answered   questions encouraged

## 2020-10-19 NOTE — PLAN OF CARE
Pt tolerated Ocrevus today.pt stayed 1 hour post infusion observation. VSS. NAD. PIV flushed and removed. declined AVS. Uses my [facility]. Discharged home. Ambulated independently.

## 2020-10-21 ENCOUNTER — OFFICE VISIT (OUTPATIENT)
Dept: SLEEP MEDICINE | Facility: CLINIC | Age: 35
End: 2020-10-21
Payer: COMMERCIAL

## 2020-10-21 DIAGNOSIS — G47.30 SLEEP APNEA, UNSPECIFIED TYPE: Primary | ICD-10-CM

## 2020-10-21 DIAGNOSIS — G35 MS (MULTIPLE SCLEROSIS): ICD-10-CM

## 2020-10-21 PROCEDURE — 99203 PR OFFICE/OUTPT VISIT, NEW, LEVL III, 30-44 MIN: ICD-10-PCS | Mod: 95,,, | Performed by: NURSE PRACTITIONER

## 2020-10-21 PROCEDURE — 99203 OFFICE O/P NEW LOW 30 MIN: CPT | Mod: 95,,, | Performed by: NURSE PRACTITIONER

## 2020-10-21 NOTE — LETTER
October 21, 2020      Farida Noyola PA-C  [address]  [facility] [address]           [id number]  [address]  [facility] [address]  Phone: [phone]          Patient: Cari Barillas   MR Number: [id number]   YOB: 1985   Date of Visit: 10/21/2020       Dear Farida Noyola:    Thank you for referring Cari Barillas to me for evaluation. Attached you will find relevant portions of my assessment and plan of care.    If you have questions, please do not hesitate to call me. I look forward to following Cari Barillas along with you.    Sincerely,    Rosey Robison, NP    Enclosure  CC:  No Recipients    If you would like to receive this communication electronically, please contact [email or web address] or [phone] to request more information on [employer] access.    For providers and/or their staff who would like to refer a patient to [facility], please contact us through our one-stop-shop provider referral line, [facility] , at [phone].    If you feel you have received this communication in error or would no longer like to receive these types of communications, please e-mail [email or web address]

## 2020-10-21 NOTE — PROGRESS NOTES
The patient location is: home  The chief complaint leading to consultation is: sleep disturbance    Visit type: TELE AUDIOVISUAL:54961    Face to Face time with patient: 35 minutes of total time spent on the encounter, which includes face to face time and non-face to face time preparing to see the patient (eg, review of tests), Obtaining and/or reviewing separately obtained history, Documenting clinical information in the electronic or other health record, Independently interpreting results (not separately reported) and communicating results to the patient/family/caregiver, or Care coordination (not separately reported).   Each patient to whom he or she provides medical services by telemedicine is:  (1) informed of the relationship between the physician and patient and the respective role of any other health care provider with respect to management of the patient; and (2) notified that he or she may decline to receive medical services by telemedicine and may withdraw from such care at any time.    REFERRED by ANNAMARIE Noyola    CHIEF COMPLAINT: Excessive daytime sleepiness    HISTORY OF PRESENT ILLNESS:She has never had a sleep study. Been excessively sleepy x 1.5 -2 yrs. Has MS.  Denies witnessed apneic pauses. Snores only when drunk. Sleeping on back or R side. Has TMJ so can awaken with am headaches. Mouth guards hurt/chews on them. Sleeps all the time, oversleeps. Sleeps hard, rarely awakens but still hard to get out of bed. Sleeps through alarms. Jerks when she's going to sleep, almost like a spasm    Denies symptoms of restless legs or kicking during sleep.    +vivid dreams with sleep onset  Denies sleep hallucinations  Denies sleep paralysis  Denies cataplexy  +depressed mood    +MS dx'd June 2020    EPWORTH SLEEPINESS SCALE 10/21/2020   Sitting and reading 1   Watching TV 1   Sitting, inactive in a public place (e.g. a theatre or a meeting) 0   As a passenger in a car for an hour without a break 1   Lying down to  "rest in the afternoon when circumstances permit 2   Sitting and talking to someone 1   Sitting quietly after a lunch without alcohol 1   In a car, while stopped for a few minutes in traffic 0   Total score 7     Sleep Clinic New Patient 10/21/2020   What time do you go to bed on a week day? (Give a range) 11-12a, watches tv for a minute then turns off   What time do you go to bed on a day off? (Give a range) 12-1   How long does it take you to fall asleep? (Give a range) 1-2hrs   On average, how many times per night do you wake up? Rarely   How long does it take you to fall back into sleep? (Give a range) Instant   What time do you wake up to start your day on a week day? (Give a range) 8   What time do you wake up to start your day on a day off? (Give a range) Whenever   What time do you get out of bed? (Give a range) 12   On average, how many hours do you sleep? 8-12   On average, how many naps do you take per day? 0   Rate your sleep quality from 0 to 5 (0-poor, 5-great). 4   Have you experienced:  Weight gain?   How much weight have you lost or gained (in lbs.) in the last year? 10   On average, how many times per night do you go to the bathroom?  0   Have you ever had a sleep study/CPAP machine/surgery for sleep apnea? No   Have you ever had a CPAP machine for sleep apnea? No   Have you ever had surgery for sleep apnea? 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  No   Sore throat or dry mouth in the morning? Yes   Irregular or very fast heart beat?  No   Shortness of breath?  No   Acid reflux? No   Body aches and pains?  Sometimes   Morning headaches? Sometimes   Dizziness? Sometimes   Mood changes?  Sometimes   Do you exercise?  No   Do you feel like moving your legs a lot?  No "   Otherwise, a balance of 10 systems reviewed is negative      PHYSICAL EXAM:   There were no vitals taken for this visit.  GENERAL: Obese body habitus, well groomed BMI 32  HEENT: Conjunctivae are non-erythematous; sitting in dark bedroom smoking cigarette. Nose is symmetrical  SKIN: On face and neck: No abrasions, no rashes, no lesions  RESPIRATORY: Normal chest expansion and non-labored breathing at rest.   NEURO/PSYCH: Oriented to time, place and person. Normal attention span and concentration. Affect is full. Mood is normal.       ASSESSMENT:     Unspecified Sleep Apnea, with symptoms of questionable routine snoring, ongoing un-refreshing infrequently disrupted sleep and excessive daytime sleepiness, with medical comorbidities of obesity, new dx MS 2020, mood disturbance.  Warrants further investigation for untreated sleep apnea.     PLAN:   1. Polysomnogram, discussed plan of care  2. Discussed etiology of JUNIOR and potential ramifications of untreated JUNIOR, including stroke, heart disease, HTN.  We discussed potential treatment options, which could include weight loss (10-15%), body positioning, continuous positive airway pressure (CPAP-definitive), mandibular advancement splint by dentist, or referral for surgical consideration.   3. The patient was advised to abstain from driving should she feel sleepy or drowsy.         Thank you for allowing me the opportunity to participate in the care of your patient'

## 2020-10-26 ENCOUNTER — PATIENT MESSAGE (OUTPATIENT)
Dept: SLEEP MEDICINE | Facility: CLINIC | Age: 35
End: 2020-10-26

## 2020-11-02 ENCOUNTER — PATIENT MESSAGE (OUTPATIENT)
Dept: PSYCHIATRY | Facility: CLINIC | Age: 35
End: 2020-11-02

## 2020-11-03 ENCOUNTER — OFFICE VISIT (OUTPATIENT)
Dept: NEUROLOGY | Facility: CLINIC | Age: 35
End: 2020-11-03
Payer: COMMERCIAL

## 2020-11-03 ENCOUNTER — TELEPHONE (OUTPATIENT)
Dept: SLEEP MEDICINE | Facility: CLINIC | Age: 35
End: 2020-11-03

## 2020-11-03 ENCOUNTER — PATIENT MESSAGE (OUTPATIENT)
Dept: NEUROLOGY | Facility: CLINIC | Age: 35
End: 2020-11-03

## 2020-11-03 ENCOUNTER — PATIENT MESSAGE (OUTPATIENT)
Dept: SLEEP MEDICINE | Facility: CLINIC | Age: 35
End: 2020-11-03

## 2020-11-03 VITALS — WEIGHT: 190 LBS | BODY MASS INDEX: 32.61 KG/M2

## 2020-11-03 DIAGNOSIS — F41.9 ANXIETY: ICD-10-CM

## 2020-11-03 DIAGNOSIS — Z79.899 IMMUNOSUPPRESSION DUE TO DRUG THERAPY: ICD-10-CM

## 2020-11-03 DIAGNOSIS — M79.2 NEUROPATHIC PAIN: ICD-10-CM

## 2020-11-03 DIAGNOSIS — G35 MULTIPLE SCLEROSIS: Primary | ICD-10-CM

## 2020-11-03 DIAGNOSIS — R53.82 CHRONIC FATIGUE: ICD-10-CM

## 2020-11-03 DIAGNOSIS — G47.30 SLEEP APNEA, UNSPECIFIED TYPE: Primary | ICD-10-CM

## 2020-11-03 DIAGNOSIS — D84.821 IMMUNOSUPPRESSION DUE TO DRUG THERAPY: ICD-10-CM

## 2020-11-03 DIAGNOSIS — Z71.89 COUNSELING REGARDING GOALS OF CARE: ICD-10-CM

## 2020-11-03 PROCEDURE — 99214 PR OFFICE/OUTPT VISIT, EST, LEVL IV, 30-39 MIN: ICD-10-PCS | Mod: 95,,, | Performed by: PHYSICIAN ASSISTANT

## 2020-11-03 PROCEDURE — 3008F PR BODY MASS INDEX (BMI) DOCUMENTED: ICD-10-PCS | Mod: CPTII,,, | Performed by: PHYSICIAN ASSISTANT

## 2020-11-03 PROCEDURE — 99214 OFFICE O/P EST MOD 30 MIN: CPT | Mod: 95,,, | Performed by: PHYSICIAN ASSISTANT

## 2020-11-03 PROCEDURE — 3008F BODY MASS INDEX DOCD: CPT | Mod: CPTII,,, | Performed by: PHYSICIAN ASSISTANT

## 2020-11-03 RX ORDER — AMANTADINE HYDROCHLORIDE 100 MG/1
CAPSULE, GELATIN COATED ORAL
Qty: 60 CAPSULE | Refills: 11 | Status: SHIPPED | OUTPATIENT
Start: 2020-11-03 | End: 2021-04-23

## 2020-11-03 NOTE — Clinical Note
Cari is feeling improved on Ocrevus and is in need of a letter discussing her fatigue. She is in process of getting sleep study(looks like she will need to do home sleep study first). One other thing she is requesting is a letter that states she can drive up to 5-6 hours(her territory is all of [place])--wanted to get your thoughts on this in consideration of her significant fatigue. I would be worried that she may fall asleep while driving....let's discuss please

## 2020-11-03 NOTE — PROGRESS NOTES
"Subjective:        The patient location is: home  The chief complaint leading to consultation is: would like clearance for work    Visit type: audiovisual    Face to Face time with patient: 25  35 minutes of total time spent on the encounter, which includes face to face time and non-face to face time preparing to see the patient (eg, review of tests), Obtaining and/or reviewing separately obtained history, Documenting clinical information in the electronic or other health record, Independently interpreting results (not separately reported) and communicating results to the patient/family/caregiver, or Care coordination (not separately reported).         Each patient to whom he or she provides medical services by telemedicine is:  (1) informed of the relationship between the physician and patient and the respective role of any other health care provider with respect to management of the patient; and (2) notified that he or she may decline to receive medical services by telemedicine and may withdraw from such care at any time.    Notes:     Patient ID: Cari Barillas is a 35 y.o. female who presents today for a routine video visit for MS.      MS HPI:  · DMT: ocrelizumab-first doses 10/5 and 10/19/2020--"I feel like it's a miracle drug"   · Side effects from DMT? No  · Taking vitamin D3 as recommended? Yes - 5,000IU/d   · Saw sleep medicine 10/21/2020-planning on sleep study-awaiting approval  · Patient is requesting clearance for return to "old job"--[employer]. 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 She states that she's "not good at falling asleep and not good at waking up" and will sometimes sleep for greater than 12 hours and sleep right through alarm  · She feels that Ocrevus has been very helpful for her cognitive abilities.   · Increase " in gabapentin has been helpful for NP pain-300mg QID(will take occasionally 600mg HS    Medications:  Current Outpatient Medications   Medication Sig    citalopram hydrobromide (CITALOPRAM ORAL) Take 40 mg by mouth once daily.     cyanocobalamin (VITAMIN B-12) 1000 MCG tablet Take 1 tablet (1,000 mcg total) by mouth once daily.    gabapentin (NEURONTIN) 300 MG capsule TAKE 1 CAPSULE BY MOUTH THREE TIMES A DAY    LORazepam (ATIVAN) 0.5 MG tablet Take 1 tablet (0.5 mg total) by mouth every 8 (eight) hours as needed for Anxiety.    magnesium 30 mg Tab Take 250 mg by mouth once.    methocarbamoL (ROBAXIN) 500 MG Tab Take 500 mg by mouth as needed for Migraine.     ocrelizumab (OCREVUS IV) Inject into the vein.    vitamin D (VITAMIN D3) 1000 units Tab Take 5,000 Units by mouth once daily.     No current facility-administered medications for this visit.        SOCIAL HISTORY  Social History     Tobacco Use    Smoking status: Current Every Day Smoker     Packs/day: 0.50   Substance Use Topics    Alcohol use: Yes     Frequency: Never     Comment: social    Drug use: Never       Living arrangements - the patient lives alone.    ROS:    REVIEW OF SYMPTOMS 7/13/2020   Do you feel abnormally tired on most days? Yes   Do you feel you generally sleep well? Yes--but has trouble going to sleep and waking up-has tried Melatonin but didn't feel like it was very helpful   Do you have difficulty controlling your bladder?  No   Do you have difficulty controlling your bowels?  No   Do you have frequent muscle cramps, tightness or spasms in your limbs?  Yes   Do you have new visual symptoms?  No   Do you have worsening difficulty with your memory or thinking? No   Do you have worsening symptoms of anxiety or depression?  No   For patients who walk, Do you have more difficulty walking?  No   Have you fallen since your last visit?  No   For patients who use wheelchairs: Do you have any skin wounds or breakdown? No   Do you have  difficulty using your hands?  No   Do you have shooting or burning pain? Yes   Do you have difficulty with sexual function?  No   If you are sexually active, are you using birth control? Y/N  N/A No   Do you often choke when swallowing liquids or solid food?  No   Do you experience worsening symptoms when overheated? Yes   Do you need any new equipment such as a wheelchair, walker or shower chair? No   Do you receive co-pay financial assistance for your principal MS medicine? Yes   Would you be interested in participating in an MS research trial in the future? Yes   For patients on Gilenya, Tecfidera, Aubagio, Rituxan, Ocrevus, Tysabri, Lemtrada or Methotrexate, are you aware that you should NOT receive live virus vaccines?  No   Do you feel you have adequate family/friend support?  Yes   Do you have health insurance?   Yes   Are you currently employed? Yes   Do you receive SSDI/SSI?  Not Applicable   Do you use marijuana or cannabis products? No   Have you been diagnosed with a urinary tract infection since your last visit here? No   Have you been diagnosed with a respiratory tract infection since your last visit here? No   Have you been to the emergency room since your last visit here? No   Have you been hospitalized since your last visit here?  No                Objective:        1. 25 foot timed walk:  Timed 25 Foot Walk: 7/15/2020 8/4/2020   Did patient wear an AFO? No No   Was assistive device used? No No   Time for 25 Foot Walk (seconds) 5.3 4.35   Time for 25 Foot Walk (seconds) - 4.06     Neurologic Exam      Imaging:     No results found for this or any previous visit.  No results found for this or any previous visit.  No results found for this or any previous visit.  Results for orders placed during the hospital encounter of 08/05/20   MRI Brain Demyelinating W W/O Contrast    Impression Scattered small T2/FLAIR hyperintensities within the supratentorial white matter compatible with given history of  multiple sclerosis.  New enhancing subcentimeter lesion within the body of the right corpus callosum compatible with active demyelination.  Additional details above.    Partially visualized right lateral T2/STIR hyperintensity at C6-C7 which demonstrates some postcontrast enhancement.  Lesion new from recent MRI cervical spine and concerning for active demyelination.    This report was flagged in Epic as abnormal.    Electronically signed by resident: Josemanuel Graf  Date:    08/06/2020  Time:    09:01    Electronically signed by: Marty De Oliveira MD  Date:    08/06/2020  Time:    09:31     Results for orders placed during the hospital encounter of 06/10/20   MRI Cervical Spine Demyelinating W W/O Contrast    Impression Examination mildly degraded by patient motion artifact.    2 solidly enhancing lesions in the supratentorial white matter as above.  Two additional subcentimeter nonenhancing lesions elsewhere.  Although not entirely specific, findings concerning for demyelinating disease (such as multiple sclerosis) with areas of active demyelination.  Lymphoma or other process not entirely excluded.  Clinical correlation required with follow-up suggested    Cervical spine appears within normal limits.  No cervical cord lesions identified.    This report was flagged in Epic as abnormal.      Electronically signed by: Levon Sierra MD  Date:    06/11/2020  Time:    10:13     Results for orders placed during the hospital encounter of 08/05/20   MRI Thoracic Spine Demyelinating W W/O Contrast    Impression Scattered small T2/FLAIR hyperintensities within the supratentorial white matter compatible with given history of multiple sclerosis.  New enhancing subcentimeter lesion within the body of the right corpus callosum compatible with active demyelination.  Additional details above.    Partially visualized right lateral T2/STIR hyperintensity at C6-C7 which demonstrates some postcontrast enhancement.  Lesion new from recent  MRI cervical spine and concerning for active demyelination.    This report was flagged in Epic as abnormal.    Electronically signed by resident: Josemanuel Graf  Date:    08/06/2020  Time:    09:01    Electronically signed by: Marty De Oliveira MD  Date:    08/06/2020  Time:    09:31         Labs:     Lab Results   Component Value Date    [id number] 18 (L) 06/11/2020     Lab Results   Component Value Date    JCVINDEX 0.66 (H) 06/24/2020    JCVANTIBODY POSITIVE (A) 06/24/2020     Lab Results   Component Value Date    SU3IWEAR 82.3 06/24/2020    ABSOLUTECD3 2529.0 (H) 06/24/2020    PS9SJWVD 15.6 06/24/2020    ABSOLUTECD8 481.0 06/24/2020    CI8JKSUY 66.1 (H) 06/24/2020    ABSOLUTECD4 2031.0 (H) 06/24/2020    LABCD48 4.22 (H) 06/24/2020     Lab Results   Component Value Date    WBC 10.63 06/12/2020    RBC 4.54 06/12/2020    HGB 13.7 06/12/2020    HCT 41.5 06/12/2020    MCV 91 06/12/2020    MCH 30.2 06/12/2020    MCHC 33.0 06/12/2020    RDW 12.8 06/12/2020     06/12/2020    MPV 11.3 06/12/2020    GRAN 6.5 06/12/2020    GRAN 61.4 06/12/2020    LYMPH 3.3 06/12/2020    LYMPH 31.4 06/12/2020    MONO 0.6 06/12/2020    MONO 5.2 06/12/2020    EOS 0.1 06/12/2020    BASO 0.03 06/12/2020    EOSINOPHIL 1.1 06/12/2020    BASOPHIL 0.3 06/12/2020     Sodium   Date Value Ref Range Status   06/12/2020 138 136 - 145 mmol/L Final     Potassium   Date Value Ref Range Status   06/12/2020 4.2 3.5 - 5.1 mmol/L Final     Chloride   Date Value Ref Range Status   06/12/2020 107 95 - 110 mmol/L Final     CO2   Date Value Ref Range Status   06/12/2020 21 (L) 23 - 29 mmol/L Final     Glucose   Date Value Ref Range Status   06/12/2020 95 70 - 110 mg/dL Final     BUN   Date Value Ref Range Status   06/12/2020 9 6 - 20 mg/dL Final     Creatinine   Date Value Ref Range Status   06/12/2020 0.7 0.5 - 1.4 mg/dL Final     Calcium   Date Value Ref Range Status   06/12/2020 9.3 8.7 - 10.5 mg/dL Final     Total Protein   Date Value Ref Range Status    06/12/2020 6.7 6.0 - 8.4 g/dL Final     Albumin   Date Value Ref Range Status   06/12/2020 3.5 3.5 - 5.2 g/dL Final     Total Bilirubin   Date Value Ref Range Status   06/12/2020 0.3 0.1 - 1.0 mg/dL Final     Comment:     For infants and newborns, interpretation of results should be based  on gestational age, weight and in agreement with clinical  observations.  Premature Infant recommended reference ranges:  Up to 24 hours.............<8.0 mg/dL  Up to 48 hours............<12.0 mg/dL  3-5 days..................<15.0 mg/dL  6-29 days.................<15.0 mg/dL       Alkaline Phosphatase   Date Value Ref Range Status   06/12/2020 85 55 - 135 U/L Final     AST   Date Value Ref Range Status   06/12/2020 17 10 - 40 U/L Final     ALT   Date Value Ref Range Status   06/12/2020 23 10 - 44 U/L Final     Anion Gap   Date Value Ref Range Status   06/12/2020 10 8 - 16 mmol/L Final     eGFR if    Date Value Ref Range Status   06/12/2020 >60 >60 mL/min/1.73 m^2 Final     eGFR if non    Date Value Ref Range Status   06/12/2020 >60 >60 mL/min/1.73 m^2 Final     Comment:     Calculation used to obtain the estimated glomerular filtration  rate (eGFR) is the CKD-EPI equation.        Lab Results   Component Value Date    HEPBSAG Negative 08/10/2020    HEPBSAB Negative 08/10/2020    HEPBCAB Negative 08/10/2020           MS Impression and Plan:     NEURO MULTIPLE SCLEROSIS IMPRESSION:   Plan:     DMT:  No change in management    DMT comment:  Has recently completed first two doses of Ocrevus.     Symptom Management:  Implement change in symptom management    Implement Change in Symptom Management:  Fatigue and Case Management (started Amantadine for MS related Fatigue; sleep study pending)     Next Imaging Due: 5/4/2021     Next Labs Due: 4/4/2021       Patient will forward paperwork for job duties to be given clearance for-will discuss with Dr. Oro and Nuha Velazquez. I suspect prior to  giving clearance for driving t/o state that we will want to have sleep study completed.       Problem List Items Addressed This Visit        Neuro    Multiple sclerosis - Primary    Relevant Medications    amantadine HCL (SYMMETREL) 100 mg capsule    Neuropathic pain       Psychiatric    Anxiety       Other    Immunosuppression due to drug therapy    Chronic fatigue    Relevant Medications    amantadine HCL (SYMMETREL) 100 mg capsule      Other Visit Diagnoses     Counseling regarding goals of care              Follow up for Dr. Oro.  Patient agreed to POC today.    Attending, Dr. Agrawal, was available during today's encounter.     Farida Noyola PA-C  [facility]

## 2020-11-08 ENCOUNTER — PATIENT MESSAGE (OUTPATIENT)
Dept: SLEEP MEDICINE | Facility: CLINIC | Age: 35
End: 2020-11-08

## 2020-11-09 ENCOUNTER — TELEPHONE (OUTPATIENT)
Dept: SMOKING CESSATION | Facility: CLINIC | Age: 35
End: 2020-11-09

## 2020-11-10 ENCOUNTER — OFFICE VISIT (OUTPATIENT)
Dept: OBSTETRICS AND GYNECOLOGY | Facility: CLINIC | Age: 35
End: 2020-11-10
Payer: COMMERCIAL

## 2020-11-10 ENCOUNTER — LAB VISIT (OUTPATIENT)
Dept: LAB | Facility: OTHER | Age: 35
End: 2020-11-10
Attending: OBSTETRICS & GYNECOLOGY
Payer: COMMERCIAL

## 2020-11-10 ENCOUNTER — TELEPHONE (OUTPATIENT)
Dept: OBSTETRICS AND GYNECOLOGY | Facility: CLINIC | Age: 35
End: 2020-11-10

## 2020-11-10 VITALS
SYSTOLIC BLOOD PRESSURE: 124 MMHG | BODY MASS INDEX: 33.04 KG/M2 | DIASTOLIC BLOOD PRESSURE: 62 MMHG | WEIGHT: 192.44 LBS

## 2020-11-10 DIAGNOSIS — Z01.419 ROUTINE GYNECOLOGICAL EXAMINATION: ICD-10-CM

## 2020-11-10 DIAGNOSIS — Z11.3 SCREENING FOR STD (SEXUALLY TRANSMITTED DISEASE): ICD-10-CM

## 2020-11-10 DIAGNOSIS — A63.0 GENITAL WARTS DUE TO HPV (HUMAN PAPILLOMAVIRUS): ICD-10-CM

## 2020-11-10 DIAGNOSIS — Z01.419 ENCOUNTER FOR WELL WOMAN EXAM: Primary | ICD-10-CM

## 2020-11-10 PROCEDURE — 87624 HPV HI-RISK TYP POOLED RSLT: CPT

## 2020-11-10 PROCEDURE — 87510 GARDNER VAG DNA DIR PROBE: CPT

## 2020-11-10 PROCEDURE — 80074 ACUTE HEPATITIS PANEL: CPT

## 2020-11-10 PROCEDURE — 86592 SYPHILIS TEST NON-TREP QUAL: CPT

## 2020-11-10 PROCEDURE — 87480 CANDIDA DNA DIR PROBE: CPT

## 2020-11-10 PROCEDURE — 56501 PR DESTRUCTION,LESION(S),VULVA,SIMPLE: ICD-10-PCS | Mod: S$GLB,,, | Performed by: OBSTETRICS & GYNECOLOGY

## 2020-11-10 PROCEDURE — 99385 PREV VISIT NEW AGE 18-39: CPT | Mod: 25,S$GLB,, | Performed by: OBSTETRICS & GYNECOLOGY

## 2020-11-10 PROCEDURE — 99999 PR PBB SHADOW E&M-EST. PATIENT-LVL II: ICD-10-PCS | Mod: PBBFAC,,, | Performed by: OBSTETRICS & GYNECOLOGY

## 2020-11-10 PROCEDURE — 36415 COLL VENOUS BLD VENIPUNCTURE: CPT

## 2020-11-10 PROCEDURE — 99999 PR PBB SHADOW E&M-EST. PATIENT-LVL II: CPT | Mod: PBBFAC,,, | Performed by: OBSTETRICS & GYNECOLOGY

## 2020-11-10 PROCEDURE — 86703 HIV-1/HIV-2 1 RESULT ANTBDY: CPT

## 2020-11-10 PROCEDURE — 99385 PR PREVENTIVE VISIT,NEW,18-39: ICD-10-PCS | Mod: 25,S$GLB,, | Performed by: OBSTETRICS & GYNECOLOGY

## 2020-11-10 PROCEDURE — 56501 DESTROY VULVA LESIONS SIM: CPT | Mod: S$GLB,,, | Performed by: OBSTETRICS & GYNECOLOGY

## 2020-11-11 ENCOUNTER — PATIENT MESSAGE (OUTPATIENT)
Dept: NEUROLOGY | Facility: CLINIC | Age: 35
End: 2020-11-11

## 2020-11-11 LAB
C TRACH RRNA SPEC QL NAA+PROBE: NEGATIVE
CANDIDA RRNA VAG QL PROBE: NEGATIVE
G VAGINALIS RRNA GENITAL QL PROBE: NEGATIVE
HAV IGM SERPL QL IA: NEGATIVE
HBV CORE IGM SERPL QL IA: NEGATIVE
HBV SURFACE AG SERPL QL IA: NEGATIVE
HCV AB SERPL QL IA: NEGATIVE
HIV 1+2 AB+HIV1 P24 AG SERPL QL IA: NEGATIVE
N GONORRHOEA RRNA SPEC QL NAA+PROBE: NEGATIVE
RPR SER QL: NORMAL
T VAGINALIS RRNA GENITAL QL PROBE: NEGATIVE

## 2020-11-11 NOTE — PROCEDURES
Procedures   TCA HPV wart tx:  After explanation of procedure and verbal consent, TCA applied to 3 .25-.5 cm warts on the mons and vulva with blanching of lesion noted. Gel applied circumferentially around Wart after TCA applied. Pt tolerated the procedure well.

## 2020-11-11 NOTE — PROGRESS NOTES
SUBJECTIVE:     Chief Complaint: Annual Exam       History of Present Illness:  Annual Exam  Patient presents for annual exam.   She c/o genital warts today. Would like to have them treated. Has never has these before but has been dx with HPV in the past.   LMP: 2020  She denies any vd, vb, dyspareunia, dysuria, depression, anxiety.  Last pap was in unsure and was unsure.  Birth Control: condoms  Wants all std testing  Did not have gardasil.     GYN screening history: h/o LEEP , denies stds  Mammogram history: na  Colonoscopy history: na  Dexa history: na    FH:   Breast cancer: none  Colon cancer: maternal GM  Ovarian cancer: none    Review of patient's allergies indicates:   Allergen Reactions    Gluten protein     Soy        Past Medical History:   Diagnosis Date    Abnormal Pap smear of cervix     Anxiety     Depression     H/O LEEP     Multiple sclerosis      Past Surgical History:   Procedure Laterality Date    CERVICAL BIOPSY  W/ LOOP ELECTRODE EXCISION      TONSILLECTOMY       OB History        2    Para        Term                AB   2    Living           SAB        TAB        Ectopic        Multiple        Live Births                   Family History   Problem Relation Age of Onset    Colon cancer Maternal Grandmother 78    Diabetes Maternal Grandmother     Diabetes Mother      Social History     Tobacco Use    Smoking status: Current Every Day Smoker     Packs/day: 0.50    Smokeless tobacco: Never Used   Substance Use Topics    Alcohol use: Yes     Frequency: 2-3 times a week     Comment: social    Drug use: Not Currently     Types: Marijuana, LSD, Cocaine, MDMA (Ecstacy)       Current Outpatient Medications   Medication Sig    amantadine HCL (SYMMETREL) 100 mg capsule Take one capsule 100mg in AM for one week, then increase to 100mg in AM and 100mg at noon if needed for MS related fatigue    citalopram hydrobromide (CITALOPRAM ORAL) Take 40 mg by mouth once  daily.     cyanocobalamin (VITAMIN B-12) 1000 MCG tablet Take 1 tablet (1,000 mcg total) by mouth once daily.    gabapentin (NEURONTIN) 300 MG capsule TAKE 1 CAPSULE BY MOUTH THREE TIMES A DAY    LORazepam (ATIVAN) 0.5 MG tablet Take 1 tablet (0.5 mg total) by mouth every 8 (eight) hours as needed for Anxiety.    magnesium 30 mg Tab Take 250 mg by mouth once.    methocarbamoL (ROBAXIN) 500 MG Tab Take 500 mg by mouth as needed for Migraine.     ocrelizumab (OCREVUS IV) Inject into the vein.    vitamin D (VITAMIN D3) 1000 units Tab Take 5,000 Units by mouth once daily.     No current facility-administered medications for this visit.        Review of Systems:  GENERAL: No fever, chills, fatigability or weight loss.  CARDIOVASCULAR: No chest pain. No palpitations.  RESPIRATORY: No SOB, no wheezing.  BREAST: Denies pain. No lumps. No discharge.  VULVAR: No pain, + lesions and no itching.  VAGINAL: No relaxation, no itching, no discharge, no abnormal bleeding and no lesions.  ABDOMEN: No abdominal pain. Denies nausea. Denies vomiting. No diarrhea. No constipation  URINARY: No incontinence, no nocturia, no frequency and no dysuria.  NEUROLOGICAL: No headaches. No vision changes.       OBJECTIVE:     Vitals:    11/10/20 1417   BP: 124/62       Physical Exam:  Gen: NAD, well developed, well-nourished  HEENT: Normocephalic, atraumatic  Eyes: EOM nl, conjuntivae normal  Neck: ROM normal, no thyromegaly  Respiratory: Effort normal   Abd: soft, nontender, no masses palpated  Breast: Normal bilaterally, no masses, lesions or tenderness. No nipple discharge on expression, no lymphadenopathy bilaterally.  SSE:  Vulva: 2 small .4cm lesions on mons, flat, discolored with one raised lesion just above clitoris in midline. See TCA tx note.   Cervix: No lesions noted, nonfriable, no vaginal discharge or vaginal bleeding noted  BME:   Cervix: No CMT  Adnexa: nl bilaterally, no masses or fullness palpated  Uterus: normal,  nonenlarged  Musculoskeletal: normal ROM  Neuro: alert, AAOx3  Skin: warm and dry  Psych: mood/affect nl, behavior normal, judgement normal, thought content normal        ASSESSMENT:       ICD-10-CM ICD-9-CM    1. Encounter for well woman exam  Z01.419 V72.31    2. Routine gynecological examination  Z01.419 V72.31 Liquid-Based Pap Smear, Screening      HPV High Risk Genotypes, PCR   3. Screening for STD (sexually transmitted disease)  Z11.3 V74.5 C. trachomatis/N. gonorrhoeae by AMP DNA      Vaginosis Screen by DNA Probe      RPR      Hepatitis Panel, Acute      HIV 1/2 Ag/Ab (4th Gen)      C. trachomatis/N. gonorrhoeae by AMP DNA          Plan:      Cari was seen today for annual exam.    Diagnoses and all orders for this visit:    Encounter for well woman exam    Routine gynecological examination  -     Liquid-Based Pap Smear, Screening  -     HPV High Risk Genotypes, PCR    Screening for STD (sexually transmitted disease)  -     C. trachomatis/N. gonorrhoeae by AMP DNA  -     Vaginosis Screen by DNA Probe  -     RPR; Future  -     Hepatitis Panel, Acute; Future  -     HIV 1/2 Ag/Ab (4th Gen); Future  -     C. trachomatis/N. gonorrhoeae by AMP DNA        Orders Placed This Encounter   Procedures    HPV High Risk Genotypes, PCR    C. trachomatis/N. gonorrhoeae by AMP DNA    Vaginosis Screen by DNA Probe    C. trachomatis/N. gonorrhoeae by AMP DNA    RPR    Hepatitis Panel, Acute    HIV 1/2 Ag/Ab (4th Gen)     One Area where TCA was applied did not ang. Discussed with patient this may be a nevi and not a wart. Advised to keep an eye on area and if any enlargement noted, will rtc for bx.     Follow up in one year for annual, or prn.    Julie R Jeansonne

## 2020-11-17 ENCOUNTER — TELEPHONE (OUTPATIENT)
Dept: SLEEP MEDICINE | Facility: OTHER | Age: 35
End: 2020-11-17

## 2020-11-17 ENCOUNTER — PATIENT MESSAGE (OUTPATIENT)
Dept: NEUROLOGY | Facility: CLINIC | Age: 35
End: 2020-11-17

## 2020-11-17 ENCOUNTER — PATIENT MESSAGE (OUTPATIENT)
Dept: SLEEP MEDICINE | Facility: CLINIC | Age: 35
End: 2020-11-17

## 2020-11-18 LAB
HPV HR 12 DNA SPEC QL NAA+PROBE: NEGATIVE
HPV16 AG SPEC QL: NEGATIVE
HPV18 DNA SPEC QL NAA+PROBE: NEGATIVE

## 2020-11-20 ENCOUNTER — TELEPHONE (OUTPATIENT)
Dept: SLEEP MEDICINE | Facility: CLINIC | Age: 35
End: 2020-11-20

## 2020-11-20 ENCOUNTER — TELEPHONE (OUTPATIENT)
Dept: SLEEP MEDICINE | Facility: OTHER | Age: 35
End: 2020-11-20

## 2020-11-20 NOTE — TELEPHONE ENCOUNTER
Patient did let me know she was around someone that tested positive for Covid,she will call back to schedule the home sleep.  She will get tested then call back if its negative.

## 2020-11-20 NOTE — TELEPHONE ENCOUNTER
----- Message from Sanam Pimentel sent at 11/20/2020  9:03 AM CST -----  Regarding: Return call    Type:  Patient Returning Call    Who Called: MALI ROBERTSON [[id number]]    Who Left Message for Patient: Dwayne     Does the patient know what this is regarding?: Y    Can the clinic reply in [facility]: No    Best Call Back Number: [phone]    Additional Information: N/A

## 2020-11-30 ENCOUNTER — OFFICE VISIT (OUTPATIENT)
Dept: OBSTETRICS AND GYNECOLOGY | Facility: CLINIC | Age: 35
End: 2020-11-30
Payer: COMMERCIAL

## 2020-11-30 ENCOUNTER — TELEPHONE (OUTPATIENT)
Dept: SLEEP MEDICINE | Facility: OTHER | Age: 35
End: 2020-11-30

## 2020-11-30 VITALS
BODY MASS INDEX: 32.9 KG/M2 | HEIGHT: 64 IN | WEIGHT: 192.69 LBS | DIASTOLIC BLOOD PRESSURE: 83 MMHG | SYSTOLIC BLOOD PRESSURE: 115 MMHG

## 2020-11-30 DIAGNOSIS — L91.8 SKIN TAG: Primary | ICD-10-CM

## 2020-11-30 DIAGNOSIS — A63.0 GENITAL WARTS: ICD-10-CM

## 2020-11-30 PROCEDURE — 1126F AMNT PAIN NOTED NONE PRSNT: CPT | Mod: S$GLB,,, | Performed by: OBSTETRICS & GYNECOLOGY

## 2020-11-30 PROCEDURE — 1126F PR PAIN SEVERITY QUANTIFIED, NO PAIN PRESENT: ICD-10-PCS | Mod: S$GLB,,, | Performed by: OBSTETRICS & GYNECOLOGY

## 2020-11-30 PROCEDURE — 3008F PR BODY MASS INDEX (BMI) DOCUMENTED: ICD-10-PCS | Mod: CPTII,S$GLB,, | Performed by: OBSTETRICS & GYNECOLOGY

## 2020-11-30 PROCEDURE — 56501 DESTROY VULVA LESIONS SIM: CPT | Mod: S$GLB,,, | Performed by: OBSTETRICS & GYNECOLOGY

## 2020-11-30 PROCEDURE — 88304 PR  SURG PATH,LEVEL III: ICD-10-PCS | Mod: 26,,, | Performed by: PATHOLOGY

## 2020-11-30 PROCEDURE — 56501 PR DESTRUCTION,LESION(S),VULVA,SIMPLE: ICD-10-PCS | Mod: S$GLB,,, | Performed by: OBSTETRICS & GYNECOLOGY

## 2020-11-30 PROCEDURE — 3008F BODY MASS INDEX DOCD: CPT | Mod: CPTII,S$GLB,, | Performed by: OBSTETRICS & GYNECOLOGY

## 2020-11-30 PROCEDURE — 88304 TISSUE EXAM BY PATHOLOGIST: CPT | Mod: 26,,, | Performed by: PATHOLOGY

## 2020-11-30 PROCEDURE — 99999 PR PBB SHADOW E&M-EST. PATIENT-LVL III: CPT | Mod: PBBFAC,,, | Performed by: OBSTETRICS & GYNECOLOGY

## 2020-11-30 PROCEDURE — 11200 RMVL SKIN TAGS UP TO&INC 15: CPT | Mod: 51,S$GLB,, | Performed by: OBSTETRICS & GYNECOLOGY

## 2020-11-30 PROCEDURE — 88304 TISSUE EXAM BY PATHOLOGIST: CPT | Performed by: PATHOLOGY

## 2020-11-30 PROCEDURE — 99499 NO LOS: ICD-10-PCS | Mod: S$GLB,,, | Performed by: OBSTETRICS & GYNECOLOGY

## 2020-11-30 PROCEDURE — 99499 UNLISTED E&M SERVICE: CPT | Mod: S$GLB,,, | Performed by: OBSTETRICS & GYNECOLOGY

## 2020-11-30 PROCEDURE — 99999 PR PBB SHADOW E&M-EST. PATIENT-LVL III: ICD-10-PCS | Mod: PBBFAC,,, | Performed by: OBSTETRICS & GYNECOLOGY

## 2020-11-30 PROCEDURE — 11200 PR REMOVAL OF SKIN TAGS, UP TO 15: ICD-10-PCS | Mod: 51,S$GLB,, | Performed by: OBSTETRICS & GYNECOLOGY

## 2020-11-30 NOTE — PROCEDURES
"INCISION AND DRAINAGE    Date/Time: 11/30/2020 1:45 PM  Performed by: Julie R. Jeansonne, MD  Authorized by: Julie R. Jeansonne, MD     Time out: Immediately prior to procedure a "time out" was called to verify the correct patient, procedure, equipment, support staff and site/side marked as required.    Consent Done?:  Yes (Written)    Type:  Cyst (skin tag left thigh, lateral)  Body area:  Lower extremity  Local anesthetic: lidocaine 1% without epinephrine  Anesthetic total (ml):  1  Scalpel size: scissors.  Incision type:  Single straight  Complexity:  Simple  Drainage:  Bloody  Drainage amount:  Scant  Wound treatment:  Cauterization (silver nitrate)  Packing material:  None  Patient tolerance:  Patient tolerated the procedure well with no immediate complications      "

## 2020-11-30 NOTE — PROCEDURES
Procedures   TCA HPV wart tx:  After explanation of procedure and verbal consent, TCA applied to 1 .25 cm wart on the mons with blanching of lesion noted. Gel applied circumferentially around Wart after TCA applied. Other warts present last time have since regressed. Nevi still present left mons, same size, normal borders and coloration/pigmentation. Pt tolerated the procedure well.

## 2020-12-02 ENCOUNTER — PATIENT MESSAGE (OUTPATIENT)
Dept: PSYCHIATRY | Facility: CLINIC | Age: 35
End: 2020-12-02

## 2020-12-02 ENCOUNTER — TELEPHONE (OUTPATIENT)
Dept: PSYCHIATRY | Facility: CLINIC | Age: 35
End: 2020-12-02

## 2020-12-02 ENCOUNTER — TELEPHONE (OUTPATIENT)
Dept: SLEEP MEDICINE | Facility: OTHER | Age: 35
End: 2020-12-02

## 2020-12-03 ENCOUNTER — HOSPITAL ENCOUNTER (OUTPATIENT)
Dept: SLEEP MEDICINE | Facility: OTHER | Age: 35
Discharge: HOME | End: 2020-12-03
Attending: NURSE PRACTITIONER
Payer: COMMERCIAL

## 2020-12-03 DIAGNOSIS — G47.30 SLEEP APNEA, UNSPECIFIED TYPE: ICD-10-CM

## 2020-12-03 DIAGNOSIS — G47.33 OSA (OBSTRUCTIVE SLEEP APNEA): ICD-10-CM

## 2020-12-03 PROCEDURE — 95800 SLP STDY UNATTENDED: CPT

## 2020-12-03 PROCEDURE — 95800 PR SLEEP STUDY, UNATTENDED, RECORD HEART RATE/O2 SAT/RESP ANAL/SLEEP TIME: ICD-10-PCS | Mod: 26,,, | Performed by: PSYCHIATRY & NEUROLOGY

## 2020-12-03 PROCEDURE — 95800 SLP STDY UNATTENDED: CPT | Mod: 26,,, | Performed by: PSYCHIATRY & NEUROLOGY

## 2020-12-04 LAB
FINAL PATHOLOGIC DIAGNOSIS: NORMAL
GROSS: NORMAL

## 2020-12-10 ENCOUNTER — PATIENT MESSAGE (OUTPATIENT)
Dept: SLEEP MEDICINE | Facility: CLINIC | Age: 35
End: 2020-12-10

## 2020-12-10 NOTE — PROCEDURES
Cari Barillas  MRN:  [id number]  : 1985    Sleep Study Report    DOS: 12/3/20    PHYSICIAN INTERPRETATION AND COMMENTS: Findings are consistent with mild, obstructive sleep apnea (JUNIOR)  (G47.33), by overall AHI (apnea hypopnea index). However, findings on this study suggest that the degree of sleep disordered  breathing is in the moderate range, when RDI is measured. This study was technically adequate to allow for interpretation.  CLINICAL HISTORY: 35 year old female presented with: 16 inch neck, BMI of 33, an [place] sleepiness score of 9, and history  of depression. Based on the clinical history, the patient has a low pre-test probability of having mild JUNIOR.  SLEEP STUDY FINDINGS: Patient underwent a one night Home Sleep Test and by behavioral criteria, slept for approximately  5.6 hours, with a sleep latency of 18 minutes and a sleep efficiency of 80.4%. Mild sleep disordered breathing (AHI=10) is noted  based on a 4% hypopnea desaturation criteria, predominantly in the supine position (13 events/hour). The patient slept supine 65.2%  of the night based on valid recording time of 5.63 hours and is 2.6 times as likely to have apneas/hypopneas when supine. When  considering more subtle measures of sleep disordered breathing, the overall respiratory disturbance index is moderate (RDI=21)  based on a 1% hypopnea desaturation criteria with confirmation by surrogate arousal indicators. The apneas/hypopneas are  accompanied by minimal oxygen desaturation (percent time below 90% SpO2: 0.5%, Min SpO2: 85.5%). The average desaturation  across all sleep disordered breathing events is 2.8%. Snoring occurs for 0.3% (30 dB) of the study. The mean pulse rate is 74 BPM,  with very frequent pulse rate variability (66 events with >= 6 BPM increase/decrease per hour).  TREATMENT CONSIDERATIONS: Consider trial of Auto-titrating CPAP 6-20 cm, mask of patient's choice, and heated  humidification. If patient has  "difficulty with CPAP adherence or ongoing JUNIOR symptoms or despite CPAP adherence, then consider  an in-lab titration sleep study in order to determine optimal fixed CPAP setting. Alternatively consider oral appliance fitted by a  dentist specializing in these devices, or surgical consultation for uvulopalatopharyngoplasty (UPPP) for treatment of obstructive sleep  apnea.  DISEASE MANAGEMENT CONSIDERATIONS: Definitive treatment for JUNIOR is recommended. Consider Sleep Clinic  referral for JUNIOR management.  Signature:    ______________________    See imported Detailed Sleep Study Reports with raw data in  "Chart Review" under the "Media tab".      (This Sleep Study was interpreted by a Board Certified Sleep Specialist who conducted an epoch-by-epoch review of the entire raw data recording.)     (The indication for this sleep study was reviewed and deemed appropriate by [facility] Practice Parameters or other reasons by a Board Certified Sleep Specialist.)    __________    "

## 2020-12-15 ENCOUNTER — PATIENT MESSAGE (OUTPATIENT)
Dept: NEUROLOGY | Facility: CLINIC | Age: 35
End: 2020-12-15

## 2020-12-15 NOTE — LETTER
[facility] 6th Fl  [address]  [facility] [address]  Phone: [phone]     2020       RE: Cari Barillas (: 1985)      Dear Employer:    Ms. Barillas remains under my care for treatment of a permanent neurological condition.  She has completed an assessment with another specialist of her fatigue and sleep-related symptoms.  I am releasing her today to resume her normal work duties, without restrictions or limitations.  She may drive out of town for appointments, attend all business appointments, lift/carry/tote 40-50lb cases, etc.  I still recommend the following accommodations:     - Flexible schedule - allowing for flexibility in her start/end time, as needed  - Additional breaks throughout the day to rest, as needed  - Intermittent leave during the year to be used for recuperation and rest, to attend necessary medical appointments, and in the event of a relapse/flare-up    I will complete any FMLA or accommodation forms, as required by your company.  I am available at the above phone number for any questions or concerns.    Sincerely,    Aida Oro MD        SS:dana

## 2020-12-17 ENCOUNTER — PATIENT MESSAGE (OUTPATIENT)
Dept: NEUROLOGY | Facility: CLINIC | Age: 35
End: 2020-12-17

## 2020-12-21 ENCOUNTER — PATIENT MESSAGE (OUTPATIENT)
Dept: NEUROLOGY | Facility: CLINIC | Age: 35
End: 2020-12-21

## 2020-12-22 ENCOUNTER — PATIENT MESSAGE (OUTPATIENT)
Dept: SLEEP MEDICINE | Facility: CLINIC | Age: 35
End: 2020-12-22

## 2020-12-23 ENCOUNTER — TELEPHONE (OUTPATIENT)
Dept: OBSTETRICS AND GYNECOLOGY | Facility: CLINIC | Age: 35
End: 2020-12-23

## 2021-01-25 ENCOUNTER — HOSPITAL ENCOUNTER (OUTPATIENT)
Dept: RADIOLOGY | Facility: HOSPITAL | Age: 36
Discharge: HOME | End: 2021-01-25
Attending: PHYSICIAN ASSISTANT
Payer: MEDICAID

## 2021-01-25 ENCOUNTER — PATIENT MESSAGE (OUTPATIENT)
Dept: PSYCHIATRY | Facility: CLINIC | Age: 36
End: 2021-01-25

## 2021-01-25 DIAGNOSIS — G35 MULTIPLE SCLEROSIS: ICD-10-CM

## 2021-01-25 PROCEDURE — 70553 MRI BRAIN DEMYELINATING W/ WO CONTRAST: ICD-10-PCS | Mod: 26,,, | Performed by: RADIOLOGY

## 2021-01-25 PROCEDURE — 70553 MRI BRAIN STEM W/O & W/DYE: CPT | Mod: TC

## 2021-01-25 PROCEDURE — 25500020 PHARM REV CODE 255: Performed by: PHYSICIAN ASSISTANT

## 2021-01-25 PROCEDURE — 70553 MRI BRAIN STEM W/O & W/DYE: CPT | Mod: 26,,, | Performed by: RADIOLOGY

## 2021-01-25 PROCEDURE — A9585 GADOBUTROL INJECTION: HCPCS | Performed by: PHYSICIAN ASSISTANT

## 2021-01-25 RX ORDER — GADOBUTROL 604.72 MG/ML
9 INJECTION INTRAVENOUS
Status: COMPLETED | OUTPATIENT
Start: 2021-01-25 | End: 2021-01-25

## 2021-01-25 RX ADMIN — GADOBUTROL 9 ML: 604.72 INJECTION INTRAVENOUS at 11:01

## 2021-01-27 ENCOUNTER — TELEPHONE (OUTPATIENT)
Dept: PSYCHIATRY | Facility: CLINIC | Age: 36
End: 2021-01-27

## 2021-01-28 ENCOUNTER — PATIENT MESSAGE (OUTPATIENT)
Dept: NEUROLOGY | Facility: CLINIC | Age: 36
End: 2021-01-28

## 2021-02-03 ENCOUNTER — PATIENT MESSAGE (OUTPATIENT)
Dept: NEUROLOGY | Facility: CLINIC | Age: 36
End: 2021-02-03

## 2021-02-05 ENCOUNTER — PATIENT MESSAGE (OUTPATIENT)
Dept: NEUROLOGY | Facility: CLINIC | Age: 36
End: 2021-02-05

## 2021-02-09 ENCOUNTER — OFFICE VISIT (OUTPATIENT)
Dept: NEUROLOGY | Facility: CLINIC | Age: 36
End: 2021-02-09
Payer: MEDICAID

## 2021-02-09 ENCOUNTER — TELEPHONE (OUTPATIENT)
Dept: SLEEP MEDICINE | Facility: CLINIC | Age: 36
End: 2021-02-09

## 2021-02-09 ENCOUNTER — PATIENT MESSAGE (OUTPATIENT)
Dept: SLEEP MEDICINE | Facility: CLINIC | Age: 36
End: 2021-02-09

## 2021-02-09 ENCOUNTER — PATIENT MESSAGE (OUTPATIENT)
Dept: NEUROLOGY | Facility: CLINIC | Age: 36
End: 2021-02-09

## 2021-02-09 VITALS
BODY MASS INDEX: 32.23 KG/M2 | WEIGHT: 188.81 LBS | DIASTOLIC BLOOD PRESSURE: 77 MMHG | HEIGHT: 64 IN | HEART RATE: 84 BPM | SYSTOLIC BLOOD PRESSURE: 110 MMHG

## 2021-02-09 DIAGNOSIS — G35 MULTIPLE SCLEROSIS: Primary | ICD-10-CM

## 2021-02-09 DIAGNOSIS — G47.33 OBSTRUCTIVE SLEEP APNEA: Primary | ICD-10-CM

## 2021-02-09 DIAGNOSIS — E55.9 VITAMIN D DEFICIENCY: ICD-10-CM

## 2021-02-09 PROCEDURE — 99213 OFFICE O/P EST LOW 20 MIN: CPT | Mod: PBBFAC | Performed by: PSYCHIATRY & NEUROLOGY

## 2021-02-09 PROCEDURE — 99999 PR PBB SHADOW E&M-EST. PATIENT-LVL III: CPT | Mod: PBBFAC,,, | Performed by: PSYCHIATRY & NEUROLOGY

## 2021-02-09 PROCEDURE — 99215 PR OFFICE/OUTPT VISIT, EST, LEVL V, 40-54 MIN: ICD-10-PCS | Mod: S$PBB,,, | Performed by: PSYCHIATRY & NEUROLOGY

## 2021-02-09 PROCEDURE — 99215 OFFICE O/P EST HI 40 MIN: CPT | Mod: S$PBB,,, | Performed by: PSYCHIATRY & NEUROLOGY

## 2021-02-09 PROCEDURE — 99999 PR PBB SHADOW E&M-EST. PATIENT-LVL III: ICD-10-PCS | Mod: PBBFAC,,, | Performed by: PSYCHIATRY & NEUROLOGY

## 2021-02-09 RX ORDER — BUPROPION HCL 150 MG
150 TABLET, EXTENDED RELEASE 24 HR ORAL EVERY MORNING
COMMUNITY
Start: 2020-12-22 | End: 2022-06-27

## 2021-02-09 RX ORDER — ACETAMINOPHEN 500 MG
5000 TABLET ORAL DAILY
Qty: 90 TABLET | Refills: 3 | Status: SHIPPED | OUTPATIENT
Start: 2021-02-09 | End: 2022-01-18

## 2021-02-10 ENCOUNTER — PATIENT MESSAGE (OUTPATIENT)
Dept: SLEEP MEDICINE | Facility: CLINIC | Age: 36
End: 2021-02-10

## 2021-02-15 ENCOUNTER — CLINICAL SUPPORT (OUTPATIENT)
Dept: INFECTIOUS DISEASES | Facility: CLINIC | Age: 36
End: 2021-02-15
Payer: MEDICAID

## 2021-02-15 PROCEDURE — 99212 OFFICE O/P EST SF 10 MIN: CPT | Mod: PBBFAC,25

## 2021-02-15 PROCEDURE — 90471 IMMUNIZATION ADMIN: CPT | Mod: PBBFAC

## 2021-02-15 PROCEDURE — 99999 PR PBB SHADOW E&M-EST. PATIENT-LVL II: CPT | Mod: PBBFAC,,,

## 2021-02-15 PROCEDURE — 99999 PR PBB SHADOW E&M-EST. PATIENT-LVL II: ICD-10-PCS | Mod: PBBFAC,,,

## 2021-02-27 ENCOUNTER — PATIENT MESSAGE (OUTPATIENT)
Dept: NEUROLOGY | Facility: CLINIC | Age: 36
End: 2021-02-27

## 2021-02-28 ENCOUNTER — PATIENT MESSAGE (OUTPATIENT)
Dept: NEUROLOGY | Facility: CLINIC | Age: 36
End: 2021-02-28

## 2021-03-04 ENCOUNTER — CLINICAL SUPPORT (OUTPATIENT)
Dept: SMOKING CESSATION | Facility: CLINIC | Age: 36
End: 2021-03-04

## 2021-03-04 DIAGNOSIS — F17.200 NICOTINE DEPENDENCE: Primary | ICD-10-CM

## 2021-03-04 PROCEDURE — 99407 BEHAV CHNG SMOKING > 10 MIN: CPT | Mod: S$GLB,,, | Performed by: INTERNAL MEDICINE

## 2021-03-04 PROCEDURE — 99407 PR TOBACCO USE CESSATION INTENSIVE >10 MINUTES: ICD-10-PCS | Mod: S$GLB,,, | Performed by: INTERNAL MEDICINE

## 2021-03-05 ENCOUNTER — PATIENT MESSAGE (OUTPATIENT)
Dept: SLEEP MEDICINE | Facility: CLINIC | Age: 36
End: 2021-03-05

## 2021-03-09 ENCOUNTER — PATIENT MESSAGE (OUTPATIENT)
Dept: NEUROLOGY | Facility: CLINIC | Age: 36
End: 2021-03-09

## 2021-03-11 ENCOUNTER — TELEPHONE (OUTPATIENT)
Dept: NEUROLOGY | Facility: CLINIC | Age: 36
End: 2021-03-11

## 2021-03-17 ENCOUNTER — TELEPHONE (OUTPATIENT)
Dept: NEUROLOGY | Facility: CLINIC | Age: 36
End: 2021-03-17

## 2021-03-29 ENCOUNTER — PATIENT MESSAGE (OUTPATIENT)
Dept: NEUROLOGY | Facility: CLINIC | Age: 36
End: 2021-03-29

## 2021-04-03 ENCOUNTER — HOSPITAL ENCOUNTER (EMERGENCY)
Facility: OTHER | Age: 36
Discharge: HOME | End: 2021-04-04
Attending: EMERGENCY MEDICINE
Payer: MEDICAID

## 2021-04-03 DIAGNOSIS — M79.2 NEUROPATHIC PAIN: ICD-10-CM

## 2021-04-03 DIAGNOSIS — R42 DIZZINESS: Primary | ICD-10-CM

## 2021-04-03 PROCEDURE — 85025 COMPLETE CBC W/AUTO DIFF WBC: CPT | Performed by: EMERGENCY MEDICINE

## 2021-04-03 PROCEDURE — 96361 HYDRATE IV INFUSION ADD-ON: CPT

## 2021-04-03 PROCEDURE — 96374 THER/PROPH/DIAG INJ IV PUSH: CPT

## 2021-04-03 PROCEDURE — 63600175 PHARM REV CODE 636 W HCPCS: Performed by: EMERGENCY MEDICINE

## 2021-04-03 PROCEDURE — 96375 TX/PRO/DX INJ NEW DRUG ADDON: CPT

## 2021-04-03 PROCEDURE — 25000003 PHARM REV CODE 250: Performed by: EMERGENCY MEDICINE

## 2021-04-03 PROCEDURE — 99284 EMERGENCY DEPT VISIT MOD MDM: CPT | Mod: 25

## 2021-04-03 PROCEDURE — 80053 COMPREHEN METABOLIC PANEL: CPT | Performed by: EMERGENCY MEDICINE

## 2021-04-03 RX ORDER — KETOROLAC TROMETHAMINE 30 MG/ML
15 INJECTION, SOLUTION INTRAMUSCULAR; INTRAVENOUS
Status: COMPLETED | OUTPATIENT
Start: 2021-04-03 | End: 2021-04-03

## 2021-04-03 RX ADMIN — SODIUM CHLORIDE 1000 ML: 0.9 INJECTION, SOLUTION INTRAVENOUS at 11:04

## 2021-04-03 RX ADMIN — KETOROLAC TROMETHAMINE 15 MG: 30 INJECTION, SOLUTION INTRAMUSCULAR; INTRAVENOUS at 11:04

## 2021-04-04 VITALS
BODY MASS INDEX: 30.73 KG/M2 | OXYGEN SATURATION: 97 % | TEMPERATURE: 97 F | HEART RATE: 75 BPM | RESPIRATION RATE: 18 BRPM | HEIGHT: 64 IN | WEIGHT: 180 LBS | DIASTOLIC BLOOD PRESSURE: 65 MMHG | SYSTOLIC BLOOD PRESSURE: 107 MMHG

## 2021-04-04 LAB
ALBUMIN SERPL BCP-MCNC: 3.4 G/DL (ref 3.5–5.2)
ALP SERPL-CCNC: 85 U/L (ref 55–135)
ALT SERPL W/O P-5'-P-CCNC: 18 U/L (ref 10–44)
ANION GAP SERPL CALC-SCNC: 8 MMOL/L (ref 8–16)
AST SERPL-CCNC: 17 U/L (ref 10–40)
BASOPHILS # BLD AUTO: 0.05 K/UL (ref 0–0.2)
BASOPHILS NFR BLD: 0.4 % (ref 0–1.9)
BILIRUB SERPL-MCNC: 0.2 MG/DL (ref 0.1–1)
BUN SERPL-MCNC: 7 MG/DL (ref 6–20)
CALCIUM SERPL-MCNC: 8.9 MG/DL (ref 8.7–10.5)
CHLORIDE SERPL-SCNC: 105 MMOL/L (ref 95–110)
CO2 SERPL-SCNC: 25 MMOL/L (ref 23–29)
CREAT SERPL-MCNC: 0.8 MG/DL (ref 0.5–1.4)
DIFFERENTIAL METHOD: NORMAL
EOSINOPHIL # BLD AUTO: 0.4 K/UL (ref 0–0.5)
EOSINOPHIL NFR BLD: 3.3 % (ref 0–8)
ERYTHROCYTE [DISTWIDTH] IN BLOOD BY AUTOMATED COUNT: 13 % (ref 11.5–14.5)
EST. GFR  (AFRICAN AMERICAN): >60 ML/MIN/1.73 M^2
EST. GFR  (NON AFRICAN AMERICAN): >60 ML/MIN/1.73 M^2
GLUCOSE SERPL-MCNC: 120 MG/DL (ref 70–110)
HCT VFR BLD AUTO: 40.9 % (ref 37–48.5)
HGB BLD-MCNC: 13.4 G/DL (ref 12–16)
IMM GRANULOCYTES # BLD AUTO: 0.02 K/UL (ref 0–0.04)
IMM GRANULOCYTES NFR BLD AUTO: 0.2 % (ref 0–0.5)
LYMPHOCYTES # BLD AUTO: 3 K/UL (ref 1–4.8)
LYMPHOCYTES NFR BLD: 25.5 % (ref 18–48)
MCH RBC QN AUTO: 28 PG (ref 27–31)
MCHC RBC AUTO-ENTMCNC: 32.8 G/DL (ref 32–36)
MCV RBC AUTO: 86 FL (ref 82–98)
MONOCYTES # BLD AUTO: 0.6 K/UL (ref 0.3–1)
MONOCYTES NFR BLD: 5.2 % (ref 4–15)
NEUTROPHILS # BLD AUTO: 7.6 K/UL (ref 1.8–7.7)
NEUTROPHILS NFR BLD: 65.4 % (ref 38–73)
NRBC BLD-RTO: 0 /100 WBC
PLATELET # BLD AUTO: 302 K/UL (ref 150–450)
PMV BLD AUTO: 11.5 FL (ref 9.2–12.9)
POTASSIUM SERPL-SCNC: 3.9 MMOL/L (ref 3.5–5.1)
PROT SERPL-MCNC: 6.5 G/DL (ref 6–8.4)
RBC # BLD AUTO: 4.78 M/UL (ref 4–5.4)
SODIUM SERPL-SCNC: 138 MMOL/L (ref 136–145)
WBC # BLD AUTO: 11.63 K/UL (ref 3.9–12.7)

## 2021-04-04 PROCEDURE — 25000003 PHARM REV CODE 250: Performed by: EMERGENCY MEDICINE

## 2021-04-04 PROCEDURE — 63600175 PHARM REV CODE 636 W HCPCS: Performed by: EMERGENCY MEDICINE

## 2021-04-04 RX ORDER — DEXAMETHASONE SODIUM PHOSPHATE 4 MG/ML
4 INJECTION, SOLUTION INTRA-ARTICULAR; INTRALESIONAL; INTRAMUSCULAR; INTRAVENOUS; SOFT TISSUE
Status: COMPLETED | OUTPATIENT
Start: 2021-04-04 | End: 2021-04-04

## 2021-04-04 RX ORDER — HYDROCODONE BITARTRATE AND ACETAMINOPHEN 5; 325 MG/1; MG/1
1 TABLET ORAL
Status: COMPLETED | OUTPATIENT
Start: 2021-04-04 | End: 2021-04-04

## 2021-04-04 RX ORDER — MECLIZINE HYDROCHLORIDE 25 MG/1
25 TABLET ORAL 3 TIMES DAILY PRN
Qty: 20 TABLET | Refills: 0 | Status: SHIPPED | OUTPATIENT
Start: 2021-04-04 | End: 2022-01-10

## 2021-04-04 RX ADMIN — DEXAMETHASONE SODIUM PHOSPHATE 4 MG: 4 INJECTION INTRA-ARTICULAR; INTRALESIONAL; INTRAMUSCULAR; INTRAVENOUS; SOFT TISSUE at 02:04

## 2021-04-04 RX ADMIN — HYDROCODONE BITARTRATE AND ACETAMINOPHEN 1 TABLET: 5; 325 TABLET ORAL at 03:04

## 2021-04-04 RX ADMIN — SODIUM CHLORIDE 1000 ML: 0.9 INJECTION, SOLUTION INTRAVENOUS at 02:04

## 2021-04-05 ENCOUNTER — DOCUMENTATION ONLY (OUTPATIENT)
Dept: NEUROLOGY | Facility: CLINIC | Age: 36
End: 2021-04-05

## 2021-04-14 ENCOUNTER — LAB VISIT (OUTPATIENT)
Dept: LAB | Facility: OTHER | Age: 36
End: 2021-04-14
Attending: PSYCHIATRY & NEUROLOGY
Payer: MEDICAID

## 2021-04-14 DIAGNOSIS — E55.9 VITAMIN D DEFICIENCY: ICD-10-CM

## 2021-04-14 DIAGNOSIS — G35 MULTIPLE SCLEROSIS: ICD-10-CM

## 2021-04-14 LAB
25(OH)D3+25(OH)D2 SERPL-MCNC: 24 NG/ML (ref 30–96)
ALBUMIN SERPL BCP-MCNC: 3.5 G/DL (ref 3.5–5.2)
ALP SERPL-CCNC: 83 U/L (ref 55–135)
ALT SERPL W/O P-5'-P-CCNC: 16 U/L (ref 10–44)
ANION GAP SERPL CALC-SCNC: 9 MMOL/L (ref 8–16)
AST SERPL-CCNC: 14 U/L (ref 10–40)
BASOPHILS # BLD AUTO: 0.03 K/UL (ref 0–0.2)
BASOPHILS NFR BLD: 0.3 % (ref 0–1.9)
BILIRUB SERPL-MCNC: 0.4 MG/DL (ref 0.1–1)
BUN SERPL-MCNC: 9 MG/DL (ref 6–20)
CALCIUM SERPL-MCNC: 9.5 MG/DL (ref 8.7–10.5)
CHLORIDE SERPL-SCNC: 104 MMOL/L (ref 95–110)
CO2 SERPL-SCNC: 25 MMOL/L (ref 23–29)
CREAT SERPL-MCNC: 0.7 MG/DL (ref 0.5–1.4)
DIFFERENTIAL METHOD: ABNORMAL
EOSINOPHIL # BLD AUTO: 0.2 K/UL (ref 0–0.5)
EOSINOPHIL NFR BLD: 1.6 % (ref 0–8)
ERYTHROCYTE [DISTWIDTH] IN BLOOD BY AUTOMATED COUNT: 13.1 % (ref 11.5–14.5)
EST. GFR  (AFRICAN AMERICAN): >60 ML/MIN/1.73 M^2
EST. GFR  (NON AFRICAN AMERICAN): >60 ML/MIN/1.73 M^2
GLUCOSE SERPL-MCNC: 108 MG/DL (ref 70–110)
HCT VFR BLD AUTO: 39.8 % (ref 37–48.5)
HGB BLD-MCNC: 12.9 G/DL (ref 12–16)
IMM GRANULOCYTES # BLD AUTO: 0.06 K/UL (ref 0–0.04)
IMM GRANULOCYTES NFR BLD AUTO: 0.6 % (ref 0–0.5)
LYMPHOCYTES # BLD AUTO: 2.8 K/UL (ref 1–4.8)
LYMPHOCYTES NFR BLD: 26.7 % (ref 18–48)
MCH RBC QN AUTO: 28 PG (ref 27–31)
MCHC RBC AUTO-ENTMCNC: 32.4 G/DL (ref 32–36)
MCV RBC AUTO: 87 FL (ref 82–98)
MONOCYTES # BLD AUTO: 0.5 K/UL (ref 0.3–1)
MONOCYTES NFR BLD: 5 % (ref 4–15)
NEUTROPHILS # BLD AUTO: 7 K/UL (ref 1.8–7.7)
NEUTROPHILS NFR BLD: 65.8 % (ref 38–73)
NRBC BLD-RTO: 0 /100 WBC
PLATELET # BLD AUTO: 285 K/UL (ref 150–450)
PMV BLD AUTO: 11.9 FL (ref 9.2–12.9)
POTASSIUM SERPL-SCNC: 4 MMOL/L (ref 3.5–5.1)
PROT SERPL-MCNC: 6.6 G/DL (ref 6–8.4)
RBC # BLD AUTO: 4.6 M/UL (ref 4–5.4)
SODIUM SERPL-SCNC: 138 MMOL/L (ref 136–145)
WBC # BLD AUTO: 10.59 K/UL (ref 3.9–12.7)

## 2021-04-14 PROCEDURE — 82306 VITAMIN D 25 HYDROXY: CPT | Performed by: PSYCHIATRY & NEUROLOGY

## 2021-04-14 PROCEDURE — 80053 COMPREHEN METABOLIC PANEL: CPT | Performed by: PSYCHIATRY & NEUROLOGY

## 2021-04-14 PROCEDURE — 86356 MONONUCLEAR CELL ANTIGEN: CPT | Performed by: PSYCHIATRY & NEUROLOGY

## 2021-04-14 PROCEDURE — 87340 HEPATITIS B SURFACE AG IA: CPT | Performed by: PSYCHIATRY & NEUROLOGY

## 2021-04-14 PROCEDURE — 85025 COMPLETE CBC W/AUTO DIFF WBC: CPT | Performed by: PSYCHIATRY & NEUROLOGY

## 2021-04-14 PROCEDURE — 86704 HEP B CORE ANTIBODY TOTAL: CPT | Performed by: PSYCHIATRY & NEUROLOGY

## 2021-04-14 PROCEDURE — 86706 HEP B SURFACE ANTIBODY: CPT | Performed by: PSYCHIATRY & NEUROLOGY

## 2021-04-15 LAB
HBV CORE AB SERPL QL IA: NEGATIVE
HBV SURFACE AB SER-ACNC: NEGATIVE M[IU]/ML
HBV SURFACE AG SERPL QL IA: NEGATIVE

## 2021-04-16 ENCOUNTER — DOCUMENTATION ONLY (OUTPATIENT)
Dept: NEUROLOGY | Facility: CLINIC | Age: 36
End: 2021-04-16

## 2021-04-16 LAB
APPEARANCE SPEC: NORMAL
CD20 CELLS NFR SPEC: NORMAL %
SPECIMEN SOURCE: NORMAL
VIABLE CELLS NFR SPEC: 99 %

## 2021-04-19 ENCOUNTER — TELEPHONE (OUTPATIENT)
Dept: NEUROLOGY | Facility: CLINIC | Age: 36
End: 2021-04-19

## 2021-04-19 NOTE — TELEPHONE ENCOUNTER
----- Message from Corinna Welch sent at 4/19/2021  3:27 PM CDT -----  Regarding: appt  Contact: pt @ [phone]  Pt calling to speak with someone in Dr. Oro' office regarding getting an appt. Please call.

## 2021-04-23 ENCOUNTER — TELEPHONE (OUTPATIENT)
Dept: SLEEP MEDICINE | Facility: CLINIC | Age: 36
End: 2021-04-23

## 2021-04-23 ENCOUNTER — OFFICE VISIT (OUTPATIENT)
Dept: NEUROLOGY | Facility: CLINIC | Age: 36
End: 2021-04-23
Payer: MEDICAID

## 2021-04-23 VITALS — BODY MASS INDEX: 31.41 KG/M2 | WEIGHT: 183 LBS

## 2021-04-23 DIAGNOSIS — G47.33 OSA (OBSTRUCTIVE SLEEP APNEA): ICD-10-CM

## 2021-04-23 DIAGNOSIS — R53.82 CHRONIC FATIGUE: ICD-10-CM

## 2021-04-23 DIAGNOSIS — G35 MULTIPLE SCLEROSIS: Primary | ICD-10-CM

## 2021-04-23 DIAGNOSIS — E55.9 VITAMIN D DEFICIENCY: ICD-10-CM

## 2021-04-23 DIAGNOSIS — Z79.899 IMMUNOSUPPRESSION DUE TO DRUG THERAPY: ICD-10-CM

## 2021-04-23 DIAGNOSIS — M79.2 NEUROPATHIC PAIN: ICD-10-CM

## 2021-04-23 DIAGNOSIS — M62.838 MUSCLE SPASM: ICD-10-CM

## 2021-04-23 DIAGNOSIS — D84.821 IMMUNOSUPPRESSION DUE TO DRUG THERAPY: ICD-10-CM

## 2021-04-23 DIAGNOSIS — N31.9 NEUROGENIC BLADDER: ICD-10-CM

## 2021-04-23 DIAGNOSIS — Z71.89 COUNSELING REGARDING GOALS OF CARE: ICD-10-CM

## 2021-04-23 PROBLEM — G93.9 BRAIN LESION: Status: RESOLVED | Noted: 2020-06-10 | Resolved: 2021-04-23

## 2021-04-23 PROCEDURE — 99215 PR OFFICE/OUTPT VISIT, EST, LEVL V, 40-54 MIN: ICD-10-PCS | Mod: 95,,, | Performed by: PHYSICIAN ASSISTANT

## 2021-04-23 PROCEDURE — 99215 OFFICE O/P EST HI 40 MIN: CPT | Mod: 95,,, | Performed by: PHYSICIAN ASSISTANT

## 2021-04-26 ENCOUNTER — PATIENT MESSAGE (OUTPATIENT)
Dept: NEUROLOGY | Facility: CLINIC | Age: 36
End: 2021-04-26

## 2021-04-28 ENCOUNTER — OFFICE VISIT (OUTPATIENT)
Dept: SLEEP MEDICINE | Facility: CLINIC | Age: 36
End: 2021-04-28
Payer: MEDICAID

## 2021-04-28 VITALS — BODY MASS INDEX: 35.14 KG/M2 | WEIGHT: 179 LBS | HEIGHT: 60 IN

## 2021-04-28 DIAGNOSIS — G47.33 OSA (OBSTRUCTIVE SLEEP APNEA): ICD-10-CM

## 2021-04-28 DIAGNOSIS — G35 MULTIPLE SCLEROSIS: Primary | ICD-10-CM

## 2021-04-28 PROCEDURE — 99214 PR OFFICE/OUTPT VISIT, EST, LEVL IV, 30-39 MIN: ICD-10-PCS | Mod: S$PBB,,, | Performed by: NURSE PRACTITIONER

## 2021-04-28 PROCEDURE — 99211 OFF/OP EST MAY X REQ PHY/QHP: CPT | Mod: PBBFAC | Performed by: NURSE PRACTITIONER

## 2021-04-28 PROCEDURE — 99214 OFFICE O/P EST MOD 30 MIN: CPT | Mod: S$PBB,,, | Performed by: NURSE PRACTITIONER

## 2021-04-28 PROCEDURE — 99999 PR PBB SHADOW E&M-EST. PATIENT-LVL I: ICD-10-PCS | Mod: PBBFAC,,, | Performed by: NURSE PRACTITIONER

## 2021-04-28 PROCEDURE — 99999 PR PBB SHADOW E&M-EST. PATIENT-LVL I: CPT | Mod: PBBFAC,,, | Performed by: NURSE PRACTITIONER

## 2021-05-01 ENCOUNTER — LAB VISIT (OUTPATIENT)
Dept: INTERNAL MEDICINE | Facility: CLINIC | Age: 36
End: 2021-05-01
Payer: MEDICAID

## 2021-05-01 DIAGNOSIS — G35 MULTIPLE SCLEROSIS: ICD-10-CM

## 2021-05-01 PROCEDURE — U0003 INFECTIOUS AGENT DETECTION BY NUCLEIC ACID (DNA OR RNA); SEVERE ACUTE RESPIRATORY SYNDROME CORONAVIRUS 2 (SARS-COV-2) (CORONAVIRUS DISEASE [COVID-19]), AMPLIFIED PROBE TECHNIQUE, MAKING USE OF HIGH THROUGHPUT TECHNOLOGIES AS DESCRIBED BY CMS-2020-01-R: HCPCS | Performed by: PHYSICIAN ASSISTANT

## 2021-05-01 PROCEDURE — U0005 INFEC AGEN DETEC AMPLI PROBE: HCPCS | Performed by: PHYSICIAN ASSISTANT

## 2021-05-02 LAB — SARS-COV-2 RNA RESP QL NAA+PROBE: NOT DETECTED

## 2021-05-05 ENCOUNTER — TELEPHONE (OUTPATIENT)
Dept: NEUROLOGY | Facility: CLINIC | Age: 36
End: 2021-05-05

## 2021-05-05 NOTE — TELEPHONE ENCOUNTER
----- Message from Meka Olivia sent at 5/5/2021  2:55 PM CDT -----  Regarding: PA  Contact: James  [facility] Brett calling with regard to prior authorization needing more answers. He sees pt had Hepatitis B test but no results were sent over.  Also he says that this is the initial request for Ocrevus and we are requesting the maintenance dose of 600 mgs.  He needs to know if the patient has used the 300mg dosage at all, or if we are just requesting the maintenance dose as the initial dose. Please contact him at [phone] before the end of the day today so he can complete the request for the patient.

## 2021-05-07 ENCOUNTER — PATIENT MESSAGE (OUTPATIENT)
Dept: NEUROLOGY | Facility: CLINIC | Age: 36
End: 2021-05-07

## 2021-05-21 ENCOUNTER — LAB VISIT (OUTPATIENT)
Dept: INTERNAL MEDICINE | Facility: CLINIC | Age: 36
End: 2021-05-21
Payer: MEDICAID

## 2021-05-21 DIAGNOSIS — G35 MULTIPLE SCLEROSIS: ICD-10-CM

## 2021-05-21 PROCEDURE — U0003 INFECTIOUS AGENT DETECTION BY NUCLEIC ACID (DNA OR RNA); SEVERE ACUTE RESPIRATORY SYNDROME CORONAVIRUS 2 (SARS-COV-2) (CORONAVIRUS DISEASE [COVID-19]), AMPLIFIED PROBE TECHNIQUE, MAKING USE OF HIGH THROUGHPUT TECHNOLOGIES AS DESCRIBED BY CMS-2020-01-R: HCPCS | Performed by: PSYCHIATRY & NEUROLOGY

## 2021-05-21 PROCEDURE — U0005 INFEC AGEN DETEC AMPLI PROBE: HCPCS | Performed by: PSYCHIATRY & NEUROLOGY

## 2021-05-22 LAB — SARS-COV-2 RNA RESP QL NAA+PROBE: NOT DETECTED

## 2021-05-24 ENCOUNTER — INFUSION (OUTPATIENT)
Dept: INFUSION THERAPY | Facility: HOSPITAL | Age: 36
End: 2021-05-24
Payer: MEDICAID

## 2021-05-24 VITALS
TEMPERATURE: 98 F | BODY MASS INDEX: 35.05 KG/M2 | SYSTOLIC BLOOD PRESSURE: 110 MMHG | HEART RATE: 72 BPM | HEIGHT: 60 IN | RESPIRATION RATE: 18 BRPM | WEIGHT: 178.56 LBS | DIASTOLIC BLOOD PRESSURE: 62 MMHG

## 2021-05-24 DIAGNOSIS — G35 MULTIPLE SCLEROSIS: Primary | ICD-10-CM

## 2021-05-24 PROCEDURE — 96366 THER/PROPH/DIAG IV INF ADDON: CPT

## 2021-05-24 PROCEDURE — 63600175 PHARM REV CODE 636 W HCPCS: Performed by: PSYCHIATRY & NEUROLOGY

## 2021-05-24 PROCEDURE — 96365 THER/PROPH/DIAG IV INF INIT: CPT

## 2021-05-24 PROCEDURE — 96375 TX/PRO/DX INJ NEW DRUG ADDON: CPT

## 2021-05-24 PROCEDURE — 96367 TX/PROPH/DG ADDL SEQ IV INF: CPT

## 2021-05-24 PROCEDURE — 25000003 PHARM REV CODE 250: Performed by: PSYCHIATRY & NEUROLOGY

## 2021-05-24 RX ORDER — HEPARIN 100 UNIT/ML
500 SYRINGE INTRAVENOUS
Status: CANCELLED | OUTPATIENT
Start: 2021-08-23

## 2021-05-24 RX ORDER — SODIUM CHLORIDE 0.9 % (FLUSH) 0.9 %
10 SYRINGE (ML) INJECTION
Status: DISCONTINUED | OUTPATIENT
Start: 2021-05-24 | End: 2021-05-24 | Stop reason: HOSPADM

## 2021-05-24 RX ORDER — EPINEPHRINE 0.3 MG/.3ML
0.3 INJECTION SUBCUTANEOUS
Status: DISCONTINUED | OUTPATIENT
Start: 2021-05-24 | End: 2021-05-24 | Stop reason: HOSPADM

## 2021-05-24 RX ORDER — SODIUM CHLORIDE 0.9 % (FLUSH) 0.9 %
10 SYRINGE (ML) INJECTION
Status: CANCELLED | OUTPATIENT
Start: 2021-08-23

## 2021-05-24 RX ORDER — FAMOTIDINE 10 MG/ML
20 INJECTION INTRAVENOUS
Status: CANCELLED | OUTPATIENT
Start: 2021-08-23

## 2021-05-24 RX ORDER — DIPHENHYDRAMINE HYDROCHLORIDE 50 MG/ML
50 INJECTION INTRAMUSCULAR; INTRAVENOUS
Status: DISCONTINUED | OUTPATIENT
Start: 2021-05-24 | End: 2021-05-24 | Stop reason: HOSPADM

## 2021-05-24 RX ORDER — DIPHENHYDRAMINE HYDROCHLORIDE 50 MG/ML
50 INJECTION INTRAMUSCULAR; INTRAVENOUS
Status: CANCELLED | OUTPATIENT
Start: 2021-08-23

## 2021-05-24 RX ORDER — ACETAMINOPHEN 500 MG
1000 TABLET ORAL
Status: CANCELLED | OUTPATIENT
Start: 2021-08-23

## 2021-05-24 RX ORDER — HEPARIN 100 UNIT/ML
500 SYRINGE INTRAVENOUS
Status: DISCONTINUED | OUTPATIENT
Start: 2021-05-24 | End: 2021-05-24 | Stop reason: HOSPADM

## 2021-05-24 RX ORDER — ACETAMINOPHEN 500 MG
1000 TABLET ORAL
Status: COMPLETED | OUTPATIENT
Start: 2021-05-24 | End: 2021-05-24

## 2021-05-24 RX ORDER — FAMOTIDINE 10 MG/ML
20 INJECTION INTRAVENOUS
Status: COMPLETED | OUTPATIENT
Start: 2021-05-24 | End: 2021-05-24

## 2021-05-24 RX ORDER — EPINEPHRINE 0.3 MG/.3ML
0.3 INJECTION SUBCUTANEOUS
Status: CANCELLED | OUTPATIENT
Start: 2021-08-23

## 2021-05-24 RX ADMIN — SODIUM CHLORIDE: 0.9 INJECTION, SOLUTION INTRAVENOUS at 09:05

## 2021-05-24 RX ADMIN — FAMOTIDINE 20 MG: 10 INJECTION INTRAVENOUS at 09:05

## 2021-05-24 RX ADMIN — ACETAMINOPHEN 1000 MG: 500 TABLET ORAL at 09:05

## 2021-05-24 RX ADMIN — OCRELIZUMAB 600 MG: 300 INJECTION INTRAVENOUS at 11:05

## 2021-05-24 RX ADMIN — DEXTROSE: 50 INJECTION, SOLUTION INTRAVENOUS at 09:05

## 2021-05-24 RX ADMIN — DIPHENHYDRAMINE HYDROCHLORIDE 50 MG: 50 INJECTION, SOLUTION INTRAMUSCULAR; INTRAVENOUS at 10:05

## 2021-05-26 ENCOUNTER — TELEPHONE (OUTPATIENT)
Dept: SLEEP MEDICINE | Facility: CLINIC | Age: 36
End: 2021-05-26

## 2021-05-31 ENCOUNTER — PATIENT MESSAGE (OUTPATIENT)
Dept: PSYCHIATRY | Facility: CLINIC | Age: 36
End: 2021-05-31

## 2021-06-30 NOTE — LETTER
[facility] 6th [address]  [address]  [facility] [address]  Phone: [phone]       2020      RE: Cari Barillas (: 1985)      Dear Employer:    Ms. Barillas is under my care for treatment of a permanent medical condition that causes a variety of symptoms, including severe fatigue.  I recently examined her and am sending her for additional testing to evaluate for other medical conditions that could be contributing to her fatigue, but it is possible that it is simply a symptom of her neurological condition.  Individuals experiencing fatigue may find it helpful to have reasonable accommodations such as:    - Flexible schedule - allowing for flexibility in their start/end time  - Reduced schedule - decreased hours/day or decreased days/week  - Additional breaks throughout the day to rest  - Intermittent leave during the year to be used for recuperation and rest, in addition to medical appointments and possible relapses    If you require additional information regarding Ms. Barillas's medical condition or symptoms, please provide her with the appropriate forms for accommodation requests and or FMLA.      Sincerely,    RAYNE Vinson:dana   Patient vomiting. MD aware.

## 2021-07-02 ENCOUNTER — TELEPHONE (OUTPATIENT)
Dept: SLEEP MEDICINE | Facility: CLINIC | Age: 36
End: 2021-07-02

## 2021-09-01 ENCOUNTER — PATIENT MESSAGE (OUTPATIENT)
Dept: PSYCHIATRY | Facility: CLINIC | Age: 36
End: 2021-09-01

## 2021-09-02 ENCOUNTER — PATIENT MESSAGE (OUTPATIENT)
Dept: PSYCHIATRY | Facility: CLINIC | Age: 36
End: 2021-09-02

## 2021-09-04 ENCOUNTER — PATIENT MESSAGE (OUTPATIENT)
Dept: NEUROLOGY | Facility: CLINIC | Age: 36
End: 2021-09-04

## 2021-10-04 ENCOUNTER — HOSPITAL ENCOUNTER (EMERGENCY)
Facility: OTHER | Age: 36
Discharge: HOME | End: 2021-10-04
Attending: EMERGENCY MEDICINE
Payer: MEDICAID

## 2021-10-04 VITALS
BODY MASS INDEX: 30.73 KG/M2 | TEMPERATURE: 99 F | SYSTOLIC BLOOD PRESSURE: 118 MMHG | DIASTOLIC BLOOD PRESSURE: 74 MMHG | OXYGEN SATURATION: 99 % | HEIGHT: 64 IN | HEART RATE: 79 BPM | RESPIRATION RATE: 16 BRPM | WEIGHT: 180 LBS

## 2021-10-04 DIAGNOSIS — K92.1 BLOOD IN STOOL: ICD-10-CM

## 2021-10-04 DIAGNOSIS — R19.5 DARK STOOLS: Primary | ICD-10-CM

## 2021-10-04 LAB
ABO + RH BLD: NORMAL
ALBUMIN SERPL BCP-MCNC: 3.7 G/DL (ref 3.5–5.2)
ALP SERPL-CCNC: 81 U/L (ref 55–135)
ALT SERPL W/O P-5'-P-CCNC: 18 U/L (ref 10–44)
ANION GAP SERPL CALC-SCNC: 11 MMOL/L (ref 8–16)
AST SERPL-CCNC: 15 U/L (ref 10–40)
B-HCG UR QL: NEGATIVE
BASOPHILS # BLD AUTO: 0.04 K/UL (ref 0–0.2)
BASOPHILS NFR BLD: 0.3 % (ref 0–1.9)
BILIRUB SERPL-MCNC: 0.3 MG/DL (ref 0.1–1)
BILIRUB UR QL STRIP: NEGATIVE
BLD GP AB SCN CELLS X3 SERPL QL: NORMAL
BUN SERPL-MCNC: 9 MG/DL (ref 6–20)
CALCIUM SERPL-MCNC: 9.4 MG/DL (ref 8.7–10.5)
CHLORIDE SERPL-SCNC: 107 MMOL/L (ref 95–110)
CLARITY UR: CLEAR
CO2 SERPL-SCNC: 23 MMOL/L (ref 23–29)
COLOR UR: YELLOW
CREAT SERPL-MCNC: 0.8 MG/DL (ref 0.5–1.4)
CTP QC/QA: YES
DIFFERENTIAL METHOD: ABNORMAL
EOSINOPHIL # BLD AUTO: 0.1 K/UL (ref 0–0.5)
EOSINOPHIL NFR BLD: 0.9 % (ref 0–8)
ERYTHROCYTE [DISTWIDTH] IN BLOOD BY AUTOMATED COUNT: 13.2 % (ref 11.5–14.5)
EST. GFR  (AFRICAN AMERICAN): >60 ML/MIN/1.73 M^2
EST. GFR  (NON AFRICAN AMERICAN): >60 ML/MIN/1.73 M^2
GLUCOSE SERPL-MCNC: 119 MG/DL (ref 70–110)
GLUCOSE UR QL STRIP: NEGATIVE
HCT VFR BLD AUTO: 43.5 % (ref 37–48.5)
HGB BLD-MCNC: 14.3 G/DL (ref 12–16)
HGB UR QL STRIP: NEGATIVE
IMM GRANULOCYTES # BLD AUTO: 0.05 K/UL (ref 0–0.04)
IMM GRANULOCYTES NFR BLD AUTO: 0.4 % (ref 0–0.5)
KETONES UR QL STRIP: NEGATIVE
LEUKOCYTE ESTERASE UR QL STRIP: NEGATIVE
LIPASE SERPL-CCNC: 22 U/L (ref 4–60)
LYMPHOCYTES # BLD AUTO: 2.9 K/UL (ref 1–4.8)
LYMPHOCYTES NFR BLD: 23.1 % (ref 18–48)
MCH RBC QN AUTO: 30.6 PG (ref 27–31)
MCHC RBC AUTO-ENTMCNC: 32.9 G/DL (ref 32–36)
MCV RBC AUTO: 93 FL (ref 82–98)
MONOCYTES # BLD AUTO: 0.6 K/UL (ref 0.3–1)
MONOCYTES NFR BLD: 4.5 % (ref 4–15)
NEUTROPHILS # BLD AUTO: 9 K/UL (ref 1.8–7.7)
NEUTROPHILS NFR BLD: 70.8 % (ref 38–73)
NITRITE UR QL STRIP: NEGATIVE
NRBC BLD-RTO: 0 /100 WBC
PH UR STRIP: 6 [PH] (ref 5–8)
PLATELET # BLD AUTO: 272 K/UL (ref 150–450)
PMV BLD AUTO: 11.9 FL (ref 9.2–12.9)
POTASSIUM SERPL-SCNC: 3.7 MMOL/L (ref 3.5–5.1)
PROT SERPL-MCNC: 6.7 G/DL (ref 6–8.4)
PROT UR QL STRIP: NEGATIVE
RBC # BLD AUTO: 4.68 M/UL (ref 4–5.4)
SODIUM SERPL-SCNC: 141 MMOL/L (ref 136–145)
SP GR UR STRIP: 1.01 (ref 1–1.03)
URN SPEC COLLECT METH UR: NORMAL
UROBILINOGEN UR STRIP-ACNC: NEGATIVE EU/DL
WBC # BLD AUTO: 12.66 K/UL (ref 3.9–12.7)

## 2021-10-04 PROCEDURE — 81003 URINALYSIS AUTO W/O SCOPE: CPT | Performed by: NURSE PRACTITIONER

## 2021-10-04 PROCEDURE — 83690 ASSAY OF LIPASE: CPT | Performed by: NURSE PRACTITIONER

## 2021-10-04 PROCEDURE — 99283 EMERGENCY DEPT VISIT LOW MDM: CPT | Mod: 25

## 2021-10-04 PROCEDURE — 80053 COMPREHEN METABOLIC PANEL: CPT | Performed by: NURSE PRACTITIONER

## 2021-10-04 PROCEDURE — 85025 COMPLETE CBC W/AUTO DIFF WBC: CPT | Performed by: NURSE PRACTITIONER

## 2021-10-04 PROCEDURE — 82272 OCCULT BLD FECES 1-3 TESTS: CPT

## 2021-10-04 PROCEDURE — 81025 URINE PREGNANCY TEST: CPT | Performed by: NURSE PRACTITIONER

## 2021-10-04 PROCEDURE — 86900 BLOOD TYPING SEROLOGIC ABO: CPT | Performed by: NURSE PRACTITIONER

## 2021-10-04 RX ORDER — ONDANSETRON 2 MG/ML
4 INJECTION INTRAMUSCULAR; INTRAVENOUS
Status: DISCONTINUED | OUTPATIENT
Start: 2021-10-04 | End: 2021-10-04 | Stop reason: HOSPADM

## 2021-10-06 ENCOUNTER — TELEPHONE (OUTPATIENT)
Dept: ADMINISTRATIVE | Facility: OTHER | Age: 36
End: 2021-10-06

## 2021-10-13 ENCOUNTER — OFFICE VISIT (OUTPATIENT)
Dept: NEUROLOGY | Facility: CLINIC | Age: 36
End: 2021-10-13
Payer: MEDICAID

## 2021-10-13 DIAGNOSIS — G35 MULTIPLE SCLEROSIS: ICD-10-CM

## 2021-10-13 DIAGNOSIS — M79.2 NEUROPATHIC PAIN: ICD-10-CM

## 2021-10-13 DIAGNOSIS — E55.9 VITAMIN D DEFICIENCY: Primary | ICD-10-CM

## 2021-10-13 PROCEDURE — 99215 PR OFFICE/OUTPT VISIT, EST, LEVL V, 40-54 MIN: ICD-10-PCS | Mod: 95,,, | Performed by: PSYCHIATRY & NEUROLOGY

## 2021-10-13 PROCEDURE — 99215 OFFICE O/P EST HI 40 MIN: CPT | Mod: 95,,, | Performed by: PSYCHIATRY & NEUROLOGY

## 2021-10-13 RX ORDER — GABAPENTIN 300 MG/1
300 CAPSULE ORAL 3 TIMES DAILY
Qty: 90 CAPSULE | Refills: 5 | Status: SHIPPED | OUTPATIENT
Start: 2021-10-13 | End: 2022-01-13

## 2021-10-13 RX ORDER — ASPIRIN 325 MG
50000 TABLET, DELAYED RELEASE (ENTERIC COATED) ORAL WEEKLY
Qty: 12 CAPSULE | Refills: 3 | Status: SHIPPED | OUTPATIENT
Start: 2021-10-13 | End: 2022-01-10

## 2021-10-19 ENCOUNTER — TELEPHONE (OUTPATIENT)
Dept: NEUROLOGY | Facility: CLINIC | Age: 36
End: 2021-10-19

## 2021-11-04 ENCOUNTER — TELEPHONE (OUTPATIENT)
Dept: GASTROENTEROLOGY | Facility: CLINIC | Age: 36
End: 2021-11-04
Payer: MEDICAID

## 2021-12-07 ENCOUNTER — HOSPITAL ENCOUNTER (OUTPATIENT)
Dept: RADIOLOGY | Facility: HOSPITAL | Age: 36
Discharge: HOME | End: 2021-12-07
Attending: PSYCHIATRY & NEUROLOGY
Payer: MEDICAID

## 2021-12-07 DIAGNOSIS — G35 MULTIPLE SCLEROSIS: ICD-10-CM

## 2021-12-07 PROCEDURE — A9585 GADOBUTROL INJECTION: HCPCS | Performed by: PSYCHIATRY & NEUROLOGY

## 2021-12-07 PROCEDURE — 70553 MRI BRAIN STEM W/O & W/DYE: CPT | Mod: TC

## 2021-12-07 PROCEDURE — 72157 MRI THORACIC SPINE DEMYELINATING W W/O CONTRAST: ICD-10-PCS | Mod: 26,,, | Performed by: RADIOLOGY

## 2021-12-07 PROCEDURE — 72157 MRI CHEST SPINE W/O & W/DYE: CPT | Mod: 26,,, | Performed by: RADIOLOGY

## 2021-12-07 PROCEDURE — 70553 MRI BRAIN DEMYELINATING W/ WO CONTRAST: ICD-10-PCS | Mod: 26,,, | Performed by: RADIOLOGY

## 2021-12-07 PROCEDURE — 72157 MRI CHEST SPINE W/O & W/DYE: CPT | Mod: TC

## 2021-12-07 PROCEDURE — 72156 MRI CERVICAL SPINE DEMYELINATING W W/O CONTRAST: ICD-10-PCS | Mod: 26,,, | Performed by: RADIOLOGY

## 2021-12-07 PROCEDURE — 70553 MRI BRAIN STEM W/O & W/DYE: CPT | Mod: 26,,, | Performed by: RADIOLOGY

## 2021-12-07 PROCEDURE — 72156 MRI NECK SPINE W/O & W/DYE: CPT | Mod: 26,,, | Performed by: RADIOLOGY

## 2021-12-07 PROCEDURE — 72156 MRI NECK SPINE W/O & W/DYE: CPT | Mod: TC

## 2021-12-07 PROCEDURE — 25500020 PHARM REV CODE 255: Performed by: PSYCHIATRY & NEUROLOGY

## 2021-12-07 RX ORDER — GADOBUTROL 604.72 MG/ML
9 INJECTION INTRAVENOUS
Status: COMPLETED | OUTPATIENT
Start: 2021-12-07 | End: 2021-12-07

## 2021-12-07 RX ADMIN — GADOBUTROL 9 ML: 604.72 INJECTION INTRAVENOUS at 04:12

## 2021-12-21 ENCOUNTER — PATIENT MESSAGE (OUTPATIENT)
Dept: NEUROLOGY | Facility: CLINIC | Age: 36
End: 2021-12-21
Payer: MEDICAID

## 2022-01-04 ENCOUNTER — TELEPHONE (OUTPATIENT)
Dept: NEUROLOGY | Facility: CLINIC | Age: 37
End: 2022-01-04
Payer: MEDICAID

## 2022-01-04 DIAGNOSIS — G35 MULTIPLE SCLEROSIS: Primary | ICD-10-CM

## 2022-01-04 NOTE — TELEPHONE ENCOUNTER
Returned call to pt. She states she felt as if she had a flare before Thanksgiving. Then again 2-3 weeks again she began having a intermittent headaches, constant numbness to her left side, constant fatigue and on her bad days she is unable to retain information and has word finding issues. These symptoms are not new. She denies any recent infections, states she is covid tested weekly.

## 2022-01-04 NOTE — TELEPHONE ENCOUNTER
----- Message from Sharonscot Naveen sent at 1/4/2022  2:12 PM CST -----  Regarding: Patient advice  Name of Who is Calling: MALI ROBERTSON [[id number]]           What is the request in detail: The patient is requesting a call back from staff in regards to being advised. She states that she has been experiencing a headache, numbness on left side, fatigue and confusion. Please advise            Can the clinic reply by [facility]: no            What [facility] to [facility] Back [facility]: [phone]

## 2022-01-05 ENCOUNTER — TELEPHONE (OUTPATIENT)
Dept: NEUROLOGY | Facility: CLINIC | Age: 37
End: 2022-01-05
Payer: MEDICAID

## 2022-01-05 NOTE — TELEPHONE ENCOUNTER
----- Message from Courtney Greene sent at 1/5/2022  2:55 PM CST -----  Regarding: PAIN  Contact: Self  Pt stated she called and left a message on yesterday 01/04/2022 regarding her pain and need to have a sooner appointment however no one has contact her as of yet Pt's pain level: 6 Pt ask for a call back today please      Contact info  [phone] (home)

## 2022-01-06 ENCOUNTER — LAB VISIT (OUTPATIENT)
Dept: LAB | Facility: OTHER | Age: 37
End: 2022-01-06
Attending: PSYCHIATRY & NEUROLOGY
Payer: MEDICAID

## 2022-01-06 DIAGNOSIS — G35 MULTIPLE SCLEROSIS: ICD-10-CM

## 2022-01-06 LAB
BASOPHILS # BLD AUTO: 0.04 K/UL (ref 0–0.2)
BASOPHILS NFR BLD: 0.3 % (ref 0–1.9)
DIFFERENTIAL METHOD: ABNORMAL
EOSINOPHIL # BLD AUTO: 0.2 K/UL (ref 0–0.5)
EOSINOPHIL NFR BLD: 1.3 % (ref 0–8)
ERYTHROCYTE [DISTWIDTH] IN BLOOD BY AUTOMATED COUNT: 12.5 % (ref 11.5–14.5)
HCT VFR BLD AUTO: 43.9 % (ref 37–48.5)
HGB BLD-MCNC: 14.8 G/DL (ref 12–16)
IMM GRANULOCYTES # BLD AUTO: 0.05 K/UL (ref 0–0.04)
IMM GRANULOCYTES NFR BLD AUTO: 0.4 % (ref 0–0.5)
LYMPHOCYTES # BLD AUTO: 3.1 K/UL (ref 1–4.8)
LYMPHOCYTES NFR BLD: 26.8 % (ref 18–48)
MCH RBC QN AUTO: 30.8 PG (ref 27–31)
MCHC RBC AUTO-ENTMCNC: 33.7 G/DL (ref 32–36)
MCV RBC AUTO: 91 FL (ref 82–98)
MONOCYTES # BLD AUTO: 0.5 K/UL (ref 0.3–1)
MONOCYTES NFR BLD: 4.7 % (ref 4–15)
NEUTROPHILS # BLD AUTO: 7.6 K/UL (ref 1.8–7.7)
NEUTROPHILS NFR BLD: 66.5 % (ref 38–73)
NRBC BLD-RTO: 0 /100 WBC
PLATELET # BLD AUTO: 314 K/UL (ref 150–450)
PMV BLD AUTO: 11.6 FL (ref 9.2–12.9)
RBC # BLD AUTO: 4.81 M/UL (ref 4–5.4)
WBC # BLD AUTO: 11.45 K/UL (ref 3.9–12.7)

## 2022-01-06 PROCEDURE — 85025 COMPLETE CBC W/AUTO DIFF WBC: CPT | Performed by: PSYCHIATRY & NEUROLOGY

## 2022-01-06 PROCEDURE — 36415 COLL VENOUS BLD VENIPUNCTURE: CPT | Performed by: PSYCHIATRY & NEUROLOGY

## 2022-01-07 ENCOUNTER — TELEPHONE (OUTPATIENT)
Dept: NEUROLOGY | Facility: CLINIC | Age: 37
End: 2022-01-07
Payer: MEDICAID

## 2022-01-07 DIAGNOSIS — N30.01 ACUTE CYSTITIS WITH HEMATURIA: Primary | ICD-10-CM

## 2022-01-07 NOTE — TELEPHONE ENCOUNTER
----- Message from Corinna Welch sent at 1/7/2022  2:49 PM CST -----  Regarding: results  Contact: pt @ [phone]  Pt asking to speak with someone in ' office regarding her lab and urine results. Please call.

## 2022-01-10 ENCOUNTER — TELEPHONE (OUTPATIENT)
Dept: NEUROLOGY | Facility: CLINIC | Age: 37
End: 2022-01-10

## 2022-01-10 ENCOUNTER — OFFICE VISIT (OUTPATIENT)
Dept: GASTROENTEROLOGY | Facility: CLINIC | Age: 37
End: 2022-01-10
Payer: MEDICAID

## 2022-01-10 DIAGNOSIS — K62.5 RECTAL BLEEDING: Primary | ICD-10-CM

## 2022-01-10 PROCEDURE — 1160F PR REVIEW ALL MEDS BY PRESCRIBER/CLIN PHARMACIST DOCUMENTED: ICD-10-PCS | Mod: CPTII,95,, | Performed by: INTERNAL MEDICINE

## 2022-01-10 PROCEDURE — 99203 PR OFFICE/OUTPT VISIT, NEW, LEVL III, 30-44 MIN: ICD-10-PCS | Mod: 95,,, | Performed by: INTERNAL MEDICINE

## 2022-01-10 PROCEDURE — 1160F RVW MEDS BY RX/DR IN RCRD: CPT | Mod: CPTII,95,, | Performed by: INTERNAL MEDICINE

## 2022-01-10 PROCEDURE — 99203 OFFICE O/P NEW LOW 30 MIN: CPT | Mod: 95,,, | Performed by: INTERNAL MEDICINE

## 2022-01-10 PROCEDURE — 1159F MED LIST DOCD IN RCRD: CPT | Mod: CPTII,95,, | Performed by: INTERNAL MEDICINE

## 2022-01-10 PROCEDURE — 1159F PR MEDICATION LIST DOCUMENTED IN MEDICAL RECORD: ICD-10-PCS | Mod: CPTII,95,, | Performed by: INTERNAL MEDICINE

## 2022-01-10 RX ORDER — NITROFURANTOIN 25; 75 MG/1; MG/1
100 CAPSULE ORAL 2 TIMES DAILY
Qty: 10 CAPSULE | Refills: 0 | Status: SHIPPED | OUTPATIENT
Start: 2022-01-10 | End: 2022-01-18

## 2022-01-10 NOTE — PROGRESS NOTES
"Subjective:       Patient ID: Cari Barillas is a 36 y.o. female.    Chief Complaint: No chief complaint on file.    The patient location is: [place]  The chief complaint leading to consultation is: rectal bleeding    Visit type: audiovisual    Face to Face time with patient: 7 mins  15 minutes of total time spent on the encounter, which includes face to face time and non-face to face time preparing to see the patient (eg, review of tests), Obtaining and/or reviewing separately obtained history, Documenting clinical information in the electronic or other health record, Independently interpreting results (not separately reported) and communicating results to the patient/family/caregiver, or Care coordination (not separately reported).     Each patient to whom he or she provides medical services by telemedicine is:  (1) informed of the relationship between the physician and patient and the respective role of any other health care provider with respect to management of the patient; and (2) notified that he or she may decline to receive medical services by telemedicine and may withdraw from such care at any time.    Notes:  37 yo F with MS referred for rectal bleeding.  She had acute onset of "dark stools" in November (chart shows early October) followed by red blood in the toilet for 2 days.  She was seen at  and then ED, d/c to home without any meds as rectal exam did not show blood and hgb was normal.  She states that the bleeding lasted for 2 days and then resolved.  It has not recurred.  She states that her neurologist wanted to make sure everything is ok before her next MS infusion.    Review of Systems   Constitutional: Negative for appetite change and unexpected weight change.   Eyes: Negative for photophobia and visual disturbance.   Respiratory: Negative for chest tightness, shortness of breath and wheezing.    Cardiovascular: Negative for chest pain, palpitations and leg swelling.   Genitourinary: Negative " for dysuria, flank pain and hematuria.   Integumentary:  Negative for color change and rash.   Neurological: Negative for dizziness and speech difficulty.   Psychiatric/Behavioral: Negative for confusion and hallucinations.     Objective:      Physical Exam  Constitutional:       Appearance: Normal appearance. She is well-developed. She is not ill-appearing.   HENT:      Head: Normocephalic and atraumatic.   Eyes:      Extraocular Movements: Extraocular movements intact.      Pupils: Pupils are equal, round, and reactive to light.   Musculoskeletal:      Cervical back: Normal range of motion and neck supple.   Neurological:      General: No focal deficit present.      Mental Status: She is alert and oriented to person, place, and time.   Psychiatric:         Behavior: Behavior normal.         Thought Content: Thought content normal.       Lab Results   Component Value Date    WBC 11.45 01/06/2022    HGB 14.8 01/06/2022    HCT 43.9 01/06/2022    MCV 91 01/06/2022     01/06/2022       BMP  Lab Results   Component Value Date     10/04/2021    K 3.7 10/04/2021     10/04/2021    CO2 23 10/04/2021    BUN 9 10/04/2021    CREATININE 0.8 10/04/2021    CALCIUM 9.4 10/04/2021    ANIONGAP 11 10/04/2021    ESTGFRAFRICA >60 10/04/2021    EGFRNONAA >60 10/04/2021       Assessment:       Problem List Items Addressed This Visit    None     Visit Diagnoses     Rectal bleeding    -  Primary          Plan:       Rectal bleeding        -     Short term bleeding 4 months ago, which was self limited and not recurrent.  I do NOT think that she had melena as her BUN was normal and her Hgb was normal.  Melena that progresses to red blood is an emergency needing transfusion and intervention, and since that was not needed, it was highly likely not melena to start with.  That means that the bleeding was either hemorrhoidal or a mild segmental colitis, both of which have resolved.  My suspicion for IBD is very low given that  symptoms only lasted 2 days and did not recur over the past 4 months.  I do not think she needs further GI workup at this time, but I will message her neurologist to see if there is anything specific to MS or her infusion that warrants endoscopy.  Should her symptoms return, she will need evaluation but hopefully much sooner that the 4 months it took her to get this appt.  She should message me via the portal should any of this recur.

## 2022-01-10 NOTE — TELEPHONE ENCOUNTER
----- Message from Patricio Copeland sent at 1/10/2022  2:37 PM CST -----  Contact: patient  Patient requesting call back    Patient @[phone]

## 2022-01-10 NOTE — TELEPHONE ENCOUNTER
Spoke with pt and made her aware Dr. Preethi huddleston tin Macrobid to treat UTI. Pt v/u. Scheduled Ocrevus labs on 1/18/22 per pt request.

## 2022-01-18 ENCOUNTER — LAB VISIT (OUTPATIENT)
Dept: LAB | Facility: HOSPITAL | Age: 37
End: 2022-01-18
Attending: PSYCHIATRY & NEUROLOGY
Payer: MEDICAID

## 2022-01-18 ENCOUNTER — OFFICE VISIT (OUTPATIENT)
Dept: NEUROLOGY | Facility: CLINIC | Age: 37
End: 2022-01-18
Payer: MEDICAID

## 2022-01-18 ENCOUNTER — PATIENT MESSAGE (OUTPATIENT)
Dept: NEUROLOGY | Facility: CLINIC | Age: 37
End: 2022-01-18

## 2022-01-18 VITALS
HEIGHT: 64 IN | WEIGHT: 178.88 LBS | HEART RATE: 84 BPM | DIASTOLIC BLOOD PRESSURE: 72 MMHG | BODY MASS INDEX: 30.54 KG/M2 | SYSTOLIC BLOOD PRESSURE: 109 MMHG

## 2022-01-18 DIAGNOSIS — G35 MULTIPLE SCLEROSIS: Primary | ICD-10-CM

## 2022-01-18 DIAGNOSIS — E55.9 VITAMIN D DEFICIENCY: ICD-10-CM

## 2022-01-18 DIAGNOSIS — M79.2 NEUROPATHIC PAIN: ICD-10-CM

## 2022-01-18 DIAGNOSIS — M62.838 MUSCLE SPASM: ICD-10-CM

## 2022-01-18 DIAGNOSIS — Z29.89 PROPHYLACTIC IMMUNOTHERAPY: ICD-10-CM

## 2022-01-18 DIAGNOSIS — Z79.899 HIGH RISK MEDICATION USE: ICD-10-CM

## 2022-01-18 DIAGNOSIS — G35 MULTIPLE SCLEROSIS: ICD-10-CM

## 2022-01-18 DIAGNOSIS — Z71.89 COUNSELING REGARDING GOALS OF CARE: ICD-10-CM

## 2022-01-18 LAB
25(OH)D3+25(OH)D2 SERPL-MCNC: 26 NG/ML (ref 30–96)
ALBUMIN SERPL BCP-MCNC: 3.8 G/DL (ref 3.5–5.2)
ALP SERPL-CCNC: 83 U/L (ref 55–135)
ALT SERPL W/O P-5'-P-CCNC: 16 U/L (ref 10–44)
ANION GAP SERPL CALC-SCNC: 8 MMOL/L (ref 8–16)
AST SERPL-CCNC: 14 U/L (ref 10–40)
BASOPHILS # BLD AUTO: 0.05 K/UL (ref 0–0.2)
BASOPHILS NFR BLD: 0.4 % (ref 0–1.9)
BILIRUB SERPL-MCNC: 0.3 MG/DL (ref 0.1–1)
BUN SERPL-MCNC: 8 MG/DL (ref 6–20)
CALCIUM SERPL-MCNC: 9.4 MG/DL (ref 8.7–10.5)
CHLORIDE SERPL-SCNC: 105 MMOL/L (ref 95–110)
CO2 SERPL-SCNC: 24 MMOL/L (ref 23–29)
CREAT SERPL-MCNC: 0.7 MG/DL (ref 0.5–1.4)
DIFFERENTIAL METHOD: ABNORMAL
EOSINOPHIL # BLD AUTO: 0.3 K/UL (ref 0–0.5)
EOSINOPHIL NFR BLD: 2.5 % (ref 0–8)
ERYTHROCYTE [DISTWIDTH] IN BLOOD BY AUTOMATED COUNT: 12.4 % (ref 11.5–14.5)
EST. GFR  (AFRICAN AMERICAN): >60 ML/MIN/1.73 M^2
EST. GFR  (NON AFRICAN AMERICAN): >60 ML/MIN/1.73 M^2
GLUCOSE SERPL-MCNC: 81 MG/DL (ref 70–110)
HCT VFR BLD AUTO: 43.8 % (ref 37–48.5)
HGB BLD-MCNC: 14.3 G/DL (ref 12–16)
IGA SERPL-MCNC: 126 MG/DL (ref 40–350)
IGG SERPL-MCNC: 623 MG/DL (ref 650–1600)
IGM SERPL-MCNC: 72 MG/DL (ref 50–300)
IMM GRANULOCYTES # BLD AUTO: 0.05 K/UL (ref 0–0.04)
IMM GRANULOCYTES NFR BLD AUTO: 0.4 % (ref 0–0.5)
LYMPHOCYTES # BLD AUTO: 2.6 K/UL (ref 1–4.8)
LYMPHOCYTES NFR BLD: 22 % (ref 18–48)
MCH RBC QN AUTO: 30.1 PG (ref 27–31)
MCHC RBC AUTO-ENTMCNC: 32.6 G/DL (ref 32–36)
MCV RBC AUTO: 92 FL (ref 82–98)
MONOCYTES # BLD AUTO: 0.7 K/UL (ref 0.3–1)
MONOCYTES NFR BLD: 5.8 % (ref 4–15)
NEUTROPHILS # BLD AUTO: 8.1 K/UL (ref 1.8–7.7)
NEUTROPHILS NFR BLD: 68.9 % (ref 38–73)
NRBC BLD-RTO: 0 /100 WBC
PLATELET # BLD AUTO: 282 K/UL (ref 150–450)
PMV BLD AUTO: 11.7 FL (ref 9.2–12.9)
POTASSIUM SERPL-SCNC: 4 MMOL/L (ref 3.5–5.1)
PROT SERPL-MCNC: 6.7 G/DL (ref 6–8.4)
RBC # BLD AUTO: 4.75 M/UL (ref 4–5.4)
SODIUM SERPL-SCNC: 137 MMOL/L (ref 136–145)
WBC # BLD AUTO: 11.8 K/UL (ref 3.9–12.7)

## 2022-01-18 PROCEDURE — 87340 HEPATITIS B SURFACE AG IA: CPT | Performed by: PSYCHIATRY & NEUROLOGY

## 2022-01-18 PROCEDURE — 36415 COLL VENOUS BLD VENIPUNCTURE: CPT | Performed by: PSYCHIATRY & NEUROLOGY

## 2022-01-18 PROCEDURE — 99999 PR PBB SHADOW E&M-EST. PATIENT-LVL III: CPT | Mod: PBBFAC,,, | Performed by: CLINICAL NURSE SPECIALIST

## 2022-01-18 PROCEDURE — 3078F PR MOST RECENT DIASTOLIC BLOOD PRESSURE < 80 MM HG: ICD-10-PCS | Mod: CPTII,,, | Performed by: CLINICAL NURSE SPECIALIST

## 2022-01-18 PROCEDURE — 99213 OFFICE O/P EST LOW 20 MIN: CPT | Mod: PBBFAC | Performed by: CLINICAL NURSE SPECIALIST

## 2022-01-18 PROCEDURE — 85025 COMPLETE CBC W/AUTO DIFF WBC: CPT | Performed by: CLINICAL NURSE SPECIALIST

## 2022-01-18 PROCEDURE — 3008F PR BODY MASS INDEX (BMI) DOCUMENTED: ICD-10-PCS | Mod: CPTII,,, | Performed by: CLINICAL NURSE SPECIALIST

## 2022-01-18 PROCEDURE — 3074F PR MOST RECENT SYSTOLIC BLOOD PRESSURE < 130 MM HG: ICD-10-PCS | Mod: CPTII,,, | Performed by: CLINICAL NURSE SPECIALIST

## 2022-01-18 PROCEDURE — 3074F SYST BP LT 130 MM HG: CPT | Mod: CPTII,,, | Performed by: CLINICAL NURSE SPECIALIST

## 2022-01-18 PROCEDURE — 1159F PR MEDICATION LIST DOCUMENTED IN MEDICAL RECORD: ICD-10-PCS | Mod: CPTII,,, | Performed by: CLINICAL NURSE SPECIALIST

## 2022-01-18 PROCEDURE — 99999 PR PBB SHADOW E&M-EST. PATIENT-LVL III: ICD-10-PCS | Mod: PBBFAC,,, | Performed by: CLINICAL NURSE SPECIALIST

## 2022-01-18 PROCEDURE — 82784 ASSAY IGA/IGD/IGG/IGM EACH: CPT | Performed by: CLINICAL NURSE SPECIALIST

## 2022-01-18 PROCEDURE — 82306 VITAMIN D 25 HYDROXY: CPT | Performed by: PSYCHIATRY & NEUROLOGY

## 2022-01-18 PROCEDURE — 3078F DIAST BP <80 MM HG: CPT | Mod: CPTII,,, | Performed by: CLINICAL NURSE SPECIALIST

## 2022-01-18 PROCEDURE — 80053 COMPREHEN METABOLIC PANEL: CPT | Performed by: PSYCHIATRY & NEUROLOGY

## 2022-01-18 PROCEDURE — 3008F BODY MASS INDEX DOCD: CPT | Mod: CPTII,,, | Performed by: CLINICAL NURSE SPECIALIST

## 2022-01-18 PROCEDURE — 86704 HEP B CORE ANTIBODY TOTAL: CPT | Performed by: PSYCHIATRY & NEUROLOGY

## 2022-01-18 PROCEDURE — 99215 OFFICE O/P EST HI 40 MIN: CPT | Mod: S$PBB,,, | Performed by: CLINICAL NURSE SPECIALIST

## 2022-01-18 PROCEDURE — 1159F MED LIST DOCD IN RCRD: CPT | Mod: CPTII,,, | Performed by: CLINICAL NURSE SPECIALIST

## 2022-01-18 PROCEDURE — 99215 PR OFFICE/OUTPT VISIT, EST, LEVL V, 40-54 MIN: ICD-10-PCS | Mod: S$PBB,,, | Performed by: CLINICAL NURSE SPECIALIST

## 2022-01-18 PROCEDURE — 86706 HEP B SURFACE ANTIBODY: CPT | Performed by: PSYCHIATRY & NEUROLOGY

## 2022-01-18 RX ORDER — ASPIRIN 325 MG
50000 TABLET, DELAYED RELEASE (ENTERIC COATED) ORAL WEEKLY
Qty: 4 CAPSULE | Refills: 5 | Status: SHIPPED | OUTPATIENT
Start: 2022-01-18 | End: 2022-06-27 | Stop reason: SDUPTHER

## 2022-01-18 RX ORDER — PREGABALIN 50 MG/1
50 CAPSULE ORAL 2 TIMES DAILY
Qty: 60 CAPSULE | Refills: 3 | Status: SHIPPED | OUTPATIENT
Start: 2022-01-18 | End: 2022-06-27

## 2022-01-18 NOTE — PROGRESS NOTES
Subjective:          Patient ID: Cari Barillas is a 36 y.o. female who presents today for a routine clinic visit for MS.  She was last seen by Dr. Oro in October. The history has been provided by the patient.     MS HPI:  · DMT: ocrelizumab last infusion was 5/24; overdue   · Side effects from DMT? No  · Taking vitamin D3 as recommended? Yes -  Dose:   She has seen GI about rectal bleeding. She hasn't had any more episodes of bleeding since the fall.   She thinks she may have had an MS flare before Thanksgiving. She had stabbing pains, spasms, numbness in the left leg, worsening cognition. She is not sure how long this lasted.   A short time later, she began having intermittent headaches, numbness to the left side, and increased fatigue.   She took antibiotics for a UTI last week.   She has not had COVID since the start of the pandemic. She is fully vaccinated with Moderna.   She works from home.   She gets acupuncture to help with pain and spasms.   She is interested in dry needling.     Medications:  Current Outpatient Medications   Medication Sig    cholecalciferol, vitamin D3, 125 mcg (5,000 unit) Tab Take 1 tablet (5,000 Units total) by mouth once daily.    citalopram hydrobromide (CITALOPRAM ORAL) Take 40 mg by mouth once daily.     cyanocobalamin (VITAMIN B-12) 1000 MCG tablet Take 1 tablet (1,000 mcg total) by mouth once daily.    gabapentin (NEURONTIN) 300 MG capsule TAKE 1 CAPSULE BY MOUTH THREE TIMES A DAY    LORazepam (ATIVAN) 0.5 MG tablet Take 1 tablet (0.5 mg total) by mouth every 8 (eight) hours as needed for Anxiety.    magnesium 30 mg Tab Take 250 mg by mouth once.    methocarbamoL (ROBAXIN) 500 MG Tab Take 500 mg by mouth as needed for Migraine.     nitrofurantoin, macrocrystal-monohydrate, (MACROBID) 100 MG capsule Take 1 capsule (100 mg total) by mouth 2 (two) times daily.    ocrelizumab (OCREVUS IV) Inject into the vein.    WELLBUTRIN  mg TB24 tablet Take 150 mg by  mouth every morning.       SOCIAL HISTORY  Social History     Tobacco Use    Smoking status: Current Every Day Smoker     Packs/day: 0.50    Smokeless tobacco: Never Used   Substance Use Topics    Alcohol use: Not Currently    Drug use: Not Currently     Types: Marijuana, LSD, Cocaine, MDMA (Ecstacy)       Living arrangements - the patient lives alone.    ROS:  REVIEW OF SYMPTOMS 1/18/22    Do you feel abnormally tired on most days? Yes--she tries to take naps if she can    Do you feel you generally sleep well? No--has a hard time falling asleep lately    Do you have difficulty controlling your bladder?  No   Do you have difficulty controlling your bowels?  No   Do you have frequent muscle cramps, tightness or spasms in your limbs?  Yes--happens daily in legs and feet    Do you have new visual symptoms?  No-has started wearing reading glasses    Do you have worsening difficulty with your memory or thinking? No--she has a hard time concentration; reading comprehension can be tough, so she avoids reading if she can    Do you have worsening symptoms of anxiety or depression?  No--She admits to both    For patients who walk, Do you have more difficulty walking?  No   Have you fallen since your last visit?  No   For patients who use wheelchairs: Do you have any skin wounds or breakdown? Not Applicable   Do you have difficulty using your hands?  No   Do you have shooting or burning pain? Yes--she mostly has pain in her feet and left leg, sometimes in hands. She describes it as a stabbing pain, sometimes accompanied by a spasm. She does not like taking gabapentin because of side effects. She wakes up with a headache daily. She does not get N/V with headaches.  She has some light and sound sensitivity with headaches. The headaches can be on both sides of the head and sometimes at the back of her head.    Do you have difficulty with sexual function?  No   If you are sexually active, are you using birth control? Y/N  N/A  No   Do you often choke when swallowing liquids or solid food?  No   Do you experience worsening symptoms when overheated? She does not like being cold. Heat makes her headache worse.    Do you need any new equipment such as a wheelchair, walker or shower chair? No   Do you receive co-pay financial assistance for your principal MS medicine? No   Would you be interested in participating in an MS research trial in the future? Yes   For patients on Gilenya, Tecfidera, Aubagio, Rituxan, Ocrevus, Tysabri, Lemtrada or Methotrexate, are you aware that you should NOT receive live virus vaccines?  Yes   Do you feel you have adequate family/friend support?  Yes   Do you have health insurance?   Yes   Are you currently employed? Yes   Do you receive SSDI/SSI?  Not Applicable   Do you use marijuana or cannabis products? No   Have you been diagnosed with a urinary tract infection since your last visit here? No   Have you been diagnosed with a respiratory tract infection since your last visit here? No   Have you been to the emergency room since your last visit here? No   Have you been hospitalized since your last visit here?  No          Objective:        1. 25 foot timed walk:  Timed 25 Foot Walk: 2/9/2021 1/18/2022   Did patient wear an AFO? No No   Was assistive device used? No No   Time for 25 Foot Walk (seconds) 3.8 3.8   Time for 25 Foot Walk (seconds) 3.65 3.8       Neurologic Exam      In general, the patient is well nourished.     MENTAL STATUS: language is fluent, normal verbal comprehension, attention is normal, patient is alert and oriented x 3, fund of knowlege is appropriate by vocabulary.      CRANIAL NERVE EXAM:  There is no internuclear ophthalmoplegia.  Extraocular muscles are intact. Pupils are equal, round, and reactive to light. No facial asymmetry. Facial sensation is intact bilaterally. There is no dysarthria. Uvula is midline, and palate moves symmetrically. Shoulder shrug intact bilaterally. Tongue  protrusion is midline. Hearing is grossly intact. Neck is supple. Acuity: 20/20 OD and OS with snellen hand held chart     MOTOR EXAM: Normal bulk and tone throughout UE and LE bilaterally.  Rapid sequential movements are normal in UE and LE.  Strength is 5/5 in all groups in the lower extremities and upper extremities;     REFLEXES: 3+ and symmetric throughout in all four extremities; toes are down bilaterally     SENSORY EXAM: Normal to light touch, vibration impaired bilateral LE's    COORDINATION: Normal finger-to-nose exam      GAIT: Narrow based and stable, normal tandem, able to hop on each foot independently. She can walk on toes and heels. Romberg is negative.      Imaging:       Results for orders placed during the hospital encounter of 12/07/21    MRI Brain Demyelinating W W/O Contrast    Impression  No significant change from prior.  Few scattered small to punctate foci of T2 FLAIR signal hyperintensity supratentorial white matter while nonspecific remain concerning for prior areas of demyelination in light of history    No definite new lesion or enhancing lesion to suggest significant interval or active demyelination.    Clinical correlation and follow-up advised.      Electronically signed by: Mal Wiggins DO  Date:    12/08/2021  Time:    07:44    Results for orders placed during the hospital encounter of 12/07/21    MRI Cervical Spine Demyelinating W W/O Contrast    Impression  Previous partially visualized T2 stir signal abnormality within the cervical cord C6/C7 on recent thoracic spine imaging no longer seen.  There is no current cord signal abnormality cervical or thoracic cord or abnormal enhancement to suggest definite interval or active demyelination in light of history..    No significant disc bulge, central canal or neural foraminal stenosis at the cervical or thoracic canal.      Electronically signed by: Mal Wiggins DO  Date:    12/08/2021  Time:    08:23    Results for orders placed  during the hospital encounter of 12/07/21    MRI Thoracic Spine Demyelinating W W/O Contrast    Impression  Previous partially visualized T2 stir signal abnormality within the cervical cord C6/C7 on recent thoracic spine imaging no longer seen.  There is no current cord signal abnormality cervical or thoracic cord or abnormal enhancement to suggest definite interval or active demyelination in light of history..    No significant disc bulge, central canal or neural foraminal stenosis at the cervical or thoracic canal.      Electronically signed by: Mla Wiggins DO  Date:    12/08/2021  Time:    08:23        Labs:     Lab Results   Component Value Date    BBUPOBMK78AT 26 (L) 01/18/2022    HOKLLLQT63CN 24 (L) 04/14/2021    RLHPGAQZ87QS 18 (L) 06/11/2020     Lab Results   Component Value Date    JCVINDEX 0.66 (H) 06/24/2020    JCVANTIBODY POSITIVE (A) 06/24/2020     Lab Results   Component Value Date    XP0AUGFL 82.3 06/24/2020    ABSOLUTECD3 2529.0 (H) 06/24/2020    OY4GJKFS 15.6 06/24/2020    ABSOLUTECD8 481.0 06/24/2020    BD4DKIYR 66.1 (H) 06/24/2020    ABSOLUTECD4 2031.0 (H) 06/24/2020    LABCD48 4.22 (H) 06/24/2020     Lab Results   Component Value Date    WBC 11.80 01/18/2022    RBC 4.75 01/18/2022    HGB 14.3 01/18/2022    HCT 43.8 01/18/2022    MCV 92 01/18/2022    MCH 30.1 01/18/2022    MCHC 32.6 01/18/2022    RDW 12.4 01/18/2022     01/18/2022    MPV 11.7 01/18/2022    GRAN 8.1 (H) 01/18/2022    GRAN 68.9 01/18/2022    LYMPH 2.6 01/18/2022    LYMPH 22.0 01/18/2022    MONO 0.7 01/18/2022    MONO 5.8 01/18/2022    EOS 0.3 01/18/2022    BASO 0.05 01/18/2022    EOSINOPHIL 2.5 01/18/2022    BASOPHIL 0.4 01/18/2022     Sodium   Date Value Ref Range Status   01/18/2022 137 136 - 145 mmol/L Final     Potassium   Date Value Ref Range Status   01/18/2022 4.0 3.5 - 5.1 mmol/L Final     Chloride   Date Value Ref Range Status   01/18/2022 105 95 - 110 mmol/L Final     CO2   Date Value Ref Range Status   01/18/2022  24 23 - 29 mmol/L Final     Glucose   Date Value Ref Range Status   01/18/2022 81 70 - 110 mg/dL Final     BUN   Date Value Ref Range Status   01/18/2022 8 6 - 20 mg/dL Final     Creatinine   Date Value Ref Range Status   01/18/2022 0.7 0.5 - 1.4 mg/dL Final     Calcium   Date Value Ref Range Status   01/18/2022 9.4 8.7 - 10.5 mg/dL Final     Total Protein   Date Value Ref Range Status   01/18/2022 6.7 6.0 - 8.4 g/dL Final     Albumin   Date Value Ref Range Status   01/18/2022 3.8 3.5 - 5.2 g/dL Final     Total Bilirubin   Date Value Ref Range Status   01/18/2022 0.3 0.1 - 1.0 mg/dL Final     Comment:     For infants and newborns, interpretation of results should be based  on gestational age, weight and in agreement with clinical  observations.    Premature Infant recommended reference ranges:  Up to 24 hours.............<8.0 mg/dL  Up to 48 hours............<12.0 mg/dL  3-5 days..................<15.0 mg/dL  6-29 days.................<15.0 mg/dL       Alkaline Phosphatase   Date Value Ref Range Status   01/18/2022 83 55 - 135 U/L Final     AST   Date Value Ref Range Status   01/18/2022 14 10 - 40 U/L Final     ALT   Date Value Ref Range Status   01/18/2022 16 10 - 44 U/L Final     Anion Gap   Date Value Ref Range Status   01/18/2022 8 8 - 16 mmol/L Final     eGFR if    Date Value Ref Range Status   01/18/2022 >60.0 >60 mL/min/1.73 m^2 Final     eGFR if non    Date Value Ref Range Status   01/18/2022 >60.0 >60 mL/min/1.73 m^2 Final     Comment:     Calculation used to obtain the estimated glomerular filtration  rate (eGFR) is the CKD-EPI equation.        Lab Results   Component Value Date    HEPBSAG Negative 04/14/2021    HEPBSAB Negative 04/14/2021    HEPBCAB Negative 04/14/2021           MS Impression and Plan:     NEURO MULTIPLE SCLEROSIS IMPRESSION:   MS Status:     Clinical Progression:  Clinically Stable    MRI Progression:  Improved  Plan:     DMT:  No change in management     DMT comment:  Continue Ocrevus and Vitamin D. She is overdue for infusion. She has been cleared by GI after episode of rectal bleeding. We will check Ocrevus safety labs today and get her scheduled ASAP. She is aware of the risks associated with immunosuppressant therapy, including increased risk of infection.       Symptom Management:  Implement change in symptom management    Implement Change in Symptom Management:  Pain (Will change gabapentin to Lyrica to see if she has less side effects. She can stop gabapentin and start Lyrica 50mg twice daily. We can increase to 50mg three times daily in a few weeks if tolerated; ultimately, we may increase to 100mg three times daily)     Discussed using magnesium glycinate 400mg before bed for headaches, sleep, and muscle spasms. She is also interested in medical marijuana and will seek out more information from Dr. Sanford.   MRIs due in December 2022  She will follow up with Dr. Oro in 3 months.       Total time spent with patient: 41 minutes   Total time spent on encounter: 54 minutes     ROBYN Abreu, CNS    Problem List Items Addressed This Visit        Neurologic Problems    Multiple sclerosis - Primary    Overview     Patient on Ocrevus and stable         Relevant Medications    cholecalciferol, vitamin D3, 1,250 mcg (50,000 unit) capsule    pregabalin (LYRICA) 50 MG capsule    Other Relevant Orders    Immunoglobulins (IgG, IgA, IgM) Quantitative (Completed)    CBC auto differential (Completed)       Other    Neuropathic pain    Relevant Medications    pregabalin (LYRICA) 50 MG capsule

## 2022-01-19 ENCOUNTER — DOCUMENTATION ONLY (OUTPATIENT)
Dept: NEUROLOGY | Facility: CLINIC | Age: 37
End: 2022-01-19
Payer: MEDICAID

## 2022-01-19 ENCOUNTER — TELEPHONE (OUTPATIENT)
Dept: NEUROLOGY | Facility: CLINIC | Age: 37
End: 2022-01-19
Payer: MEDICAID

## 2022-01-19 NOTE — TELEPHONE ENCOUNTER
----- Message from Alva Murrell sent at 1/19/2022  4:05 PM CST -----  Regarding: Pt appt  Pt calling to get scheduled for injections    [phone] (home)

## 2022-01-19 NOTE — PROGRESS NOTES
Faxed Medical Records Request Form with office notes to Dr. Aquiles Sanford office at fax number [phone].

## 2022-01-21 ENCOUNTER — PATIENT MESSAGE (OUTPATIENT)
Dept: NEUROLOGY | Facility: CLINIC | Age: 37
End: 2022-01-21
Payer: MEDICAID

## 2022-01-21 DIAGNOSIS — G35 MULTIPLE SCLEROSIS: Primary | ICD-10-CM

## 2022-01-24 RX ORDER — FAMOTIDINE 10 MG/ML
20 INJECTION INTRAVENOUS
Status: CANCELLED | OUTPATIENT
Start: 2022-01-24

## 2022-01-24 RX ORDER — DIPHENHYDRAMINE HYDROCHLORIDE 50 MG/ML
50 INJECTION INTRAMUSCULAR; INTRAVENOUS
Status: CANCELLED | OUTPATIENT
Start: 2022-01-24

## 2022-01-24 RX ORDER — EPINEPHRINE 0.3 MG/.3ML
0.3 INJECTION SUBCUTANEOUS
Status: CANCELLED | OUTPATIENT
Start: 2022-01-24

## 2022-01-24 RX ORDER — ACETAMINOPHEN 500 MG
1000 TABLET ORAL
Status: CANCELLED | OUTPATIENT
Start: 2022-01-24

## 2022-01-24 RX ORDER — SODIUM CHLORIDE 0.9 % (FLUSH) 0.9 %
10 SYRINGE (ML) INJECTION
Status: CANCELLED | OUTPATIENT
Start: 2022-01-24

## 2022-01-24 RX ORDER — HEPARIN 100 UNIT/ML
500 SYRINGE INTRAVENOUS
Status: CANCELLED | OUTPATIENT
Start: 2022-01-24

## 2022-01-26 ENCOUNTER — PATIENT MESSAGE (OUTPATIENT)
Dept: NEUROLOGY | Facility: CLINIC | Age: 37
End: 2022-01-26
Payer: MEDICAID

## 2022-01-26 ENCOUNTER — LAB VISIT (OUTPATIENT)
Dept: LAB | Facility: OTHER | Age: 37
End: 2022-01-26
Attending: CLINICAL NURSE SPECIALIST
Payer: MEDICAID

## 2022-01-26 DIAGNOSIS — G35 MULTIPLE SCLEROSIS: ICD-10-CM

## 2022-01-26 LAB
BASOPHILS # BLD AUTO: 0.03 K/UL (ref 0–0.2)
BASOPHILS NFR BLD: 0.4 % (ref 0–1.9)
DIFFERENTIAL METHOD: NORMAL
EOSINOPHIL # BLD AUTO: 0.1 K/UL (ref 0–0.5)
EOSINOPHIL NFR BLD: 1.4 % (ref 0–8)
ERYTHROCYTE [DISTWIDTH] IN BLOOD BY AUTOMATED COUNT: 12.2 % (ref 11.5–14.5)
HCT VFR BLD AUTO: 42.5 % (ref 37–48.5)
HGB BLD-MCNC: 14.1 G/DL (ref 12–16)
IMM GRANULOCYTES # BLD AUTO: 0.02 K/UL (ref 0–0.04)
IMM GRANULOCYTES NFR BLD AUTO: 0.2 % (ref 0–0.5)
LYMPHOCYTES # BLD AUTO: 2.5 K/UL (ref 1–4.8)
LYMPHOCYTES NFR BLD: 29.2 % (ref 18–48)
MCH RBC QN AUTO: 30.3 PG (ref 27–31)
MCHC RBC AUTO-ENTMCNC: 33.2 G/DL (ref 32–36)
MCV RBC AUTO: 91 FL (ref 82–98)
MONOCYTES # BLD AUTO: 0.4 K/UL (ref 0.3–1)
MONOCYTES NFR BLD: 4.8 % (ref 4–15)
NEUTROPHILS # BLD AUTO: 5.5 K/UL (ref 1.8–7.7)
NEUTROPHILS NFR BLD: 64 % (ref 38–73)
NRBC BLD-RTO: 0 /100 WBC
PLATELET # BLD AUTO: 275 K/UL (ref 150–450)
PMV BLD AUTO: 11.5 FL (ref 9.2–12.9)
RBC # BLD AUTO: 4.66 M/UL (ref 4–5.4)
WBC # BLD AUTO: 8.53 K/UL (ref 3.9–12.7)

## 2022-01-26 PROCEDURE — 85025 COMPLETE CBC W/AUTO DIFF WBC: CPT | Performed by: CLINICAL NURSE SPECIALIST

## 2022-01-26 PROCEDURE — 36415 COLL VENOUS BLD VENIPUNCTURE: CPT | Performed by: CLINICAL NURSE SPECIALIST

## 2022-01-28 ENCOUNTER — INFUSION (OUTPATIENT)
Dept: INFUSION THERAPY | Facility: HOSPITAL | Age: 37
End: 2022-01-28
Payer: MEDICAID

## 2022-01-28 VITALS
HEIGHT: 64 IN | WEIGHT: 180.75 LBS | BODY MASS INDEX: 30.86 KG/M2 | HEART RATE: 77 BPM | DIASTOLIC BLOOD PRESSURE: 76 MMHG | RESPIRATION RATE: 17 BRPM | SYSTOLIC BLOOD PRESSURE: 111 MMHG | TEMPERATURE: 98 F

## 2022-01-28 DIAGNOSIS — G35 MULTIPLE SCLEROSIS: Primary | ICD-10-CM

## 2022-01-28 PROCEDURE — 96367 TX/PROPH/DG ADDL SEQ IV INF: CPT

## 2022-01-28 PROCEDURE — 96375 TX/PRO/DX INJ NEW DRUG ADDON: CPT

## 2022-01-28 PROCEDURE — 96365 THER/PROPH/DIAG IV INF INIT: CPT

## 2022-01-28 PROCEDURE — 25000003 PHARM REV CODE 250: Performed by: PSYCHIATRY & NEUROLOGY

## 2022-01-28 PROCEDURE — 63600175 PHARM REV CODE 636 W HCPCS: Mod: JG | Performed by: PSYCHIATRY & NEUROLOGY

## 2022-01-28 PROCEDURE — 96366 THER/PROPH/DIAG IV INF ADDON: CPT

## 2022-01-28 PROCEDURE — 63600175 PHARM REV CODE 636 W HCPCS: Performed by: PSYCHIATRY & NEUROLOGY

## 2022-01-28 RX ORDER — HEPARIN 100 UNIT/ML
500 SYRINGE INTRAVENOUS
Status: DISCONTINUED | OUTPATIENT
Start: 2022-01-28 | End: 2022-01-28 | Stop reason: HOSPADM

## 2022-01-28 RX ORDER — SODIUM CHLORIDE 0.9 % (FLUSH) 0.9 %
10 SYRINGE (ML) INJECTION
Status: CANCELLED | OUTPATIENT
Start: 2022-04-29

## 2022-01-28 RX ORDER — ACETAMINOPHEN 500 MG
1000 TABLET ORAL
Status: COMPLETED | OUTPATIENT
Start: 2022-01-28 | End: 2022-01-28

## 2022-01-28 RX ORDER — SODIUM CHLORIDE 0.9 % (FLUSH) 0.9 %
10 SYRINGE (ML) INJECTION
Status: DISCONTINUED | OUTPATIENT
Start: 2022-01-28 | End: 2022-01-28 | Stop reason: HOSPADM

## 2022-01-28 RX ORDER — FAMOTIDINE 10 MG/ML
20 INJECTION INTRAVENOUS
Status: COMPLETED | OUTPATIENT
Start: 2022-01-28 | End: 2022-01-28

## 2022-01-28 RX ORDER — EPINEPHRINE 0.3 MG/.3ML
0.3 INJECTION SUBCUTANEOUS
Status: DISCONTINUED | OUTPATIENT
Start: 2022-01-28 | End: 2022-01-28 | Stop reason: HOSPADM

## 2022-01-28 RX ORDER — EPINEPHRINE 0.3 MG/.3ML
0.3 INJECTION SUBCUTANEOUS
Status: CANCELLED | OUTPATIENT
Start: 2022-04-29

## 2022-01-28 RX ORDER — ACETAMINOPHEN 500 MG
1000 TABLET ORAL
Status: CANCELLED | OUTPATIENT
Start: 2022-04-29

## 2022-01-28 RX ORDER — HEPARIN 100 UNIT/ML
500 SYRINGE INTRAVENOUS
Status: CANCELLED | OUTPATIENT
Start: 2022-04-29

## 2022-01-28 RX ORDER — DIPHENHYDRAMINE HYDROCHLORIDE 50 MG/ML
50 INJECTION INTRAMUSCULAR; INTRAVENOUS
Status: CANCELLED | OUTPATIENT
Start: 2022-04-29

## 2022-01-28 RX ORDER — DIPHENHYDRAMINE HYDROCHLORIDE 50 MG/ML
50 INJECTION INTRAMUSCULAR; INTRAVENOUS
Status: DISCONTINUED | OUTPATIENT
Start: 2022-01-28 | End: 2022-01-28 | Stop reason: HOSPADM

## 2022-01-28 RX ORDER — FAMOTIDINE 10 MG/ML
20 INJECTION INTRAVENOUS
Status: CANCELLED | OUTPATIENT
Start: 2022-04-29

## 2022-01-28 RX ADMIN — DIPHENHYDRAMINE HYDROCHLORIDE 50 MG: 50 INJECTION, SOLUTION INTRAMUSCULAR; INTRAVENOUS at 11:01

## 2022-01-28 RX ADMIN — OCRELIZUMAB 600 MG: 300 INJECTION INTRAVENOUS at 11:01

## 2022-01-28 RX ADMIN — ACETAMINOPHEN 1000 MG: 500 TABLET ORAL at 10:01

## 2022-01-28 RX ADMIN — FAMOTIDINE 20 MG: 10 INJECTION INTRAVENOUS at 10:01

## 2022-01-28 RX ADMIN — DEXTROSE: 50 INJECTION, SOLUTION INTRAVENOUS at 10:01

## 2022-01-28 RX ADMIN — SODIUM CHLORIDE: 0.9 INJECTION, SOLUTION INTRAVENOUS at 10:01

## 2022-01-28 NOTE — PLAN OF CARE
Pt arrived AAOx3, VSS, labs reviewed. Orders signed for ocrevus #4. Pt tolerated titrated infusion without issues and was discharged after OBS in stable condition with sister to home.

## 2022-02-01 ENCOUNTER — PATIENT MESSAGE (OUTPATIENT)
Dept: NEUROLOGY | Facility: CLINIC | Age: 37
End: 2022-02-01
Payer: MEDICAID

## 2022-02-01 NOTE — LETTER
[facility] - [facility] [address]  [address]  [facility] [address]  Phone: [phone]         2022      RE: Cari Barillas (: 1985)      To Whom It May Concern:    Ms. Barillas is under my care for treatment of a chronic medical condition.  She is currently unable to use the gym secondary to symptoms related to this condition.  Therefore, I request you pause her membership until further notice.  I can be reached at the above phone number with any questions or concerns.    Sincerely,    Roseanna Maddox, AGCNS-BC, APRN, MN, MSCN        AP:dana

## 2022-02-02 ENCOUNTER — PATIENT MESSAGE (OUTPATIENT)
Dept: NEUROLOGY | Facility: CLINIC | Age: 37
End: 2022-02-02
Payer: MEDICAID

## 2022-02-28 ENCOUNTER — PATIENT MESSAGE (OUTPATIENT)
Dept: PSYCHIATRY | Facility: CLINIC | Age: 37
End: 2022-02-28
Payer: MEDICAID

## 2022-03-26 NOTE — TELEPHONE ENCOUNTER
Mailed Ocrevus appeal letter, original denial letter, and relevant chart notes to:  Grievance and Appeals Dept  Formerly [facility]  [address] [address]  [place] [address]   [facility]  ED Arrival Note    Arrived to triage c/o severe throat pain and swelling that has been going on all day. Pt states that it is hard to swallow.    Visitors during triage: Brother    Triage Interventions: N/A    Ambulatory: Yes    Directed to: Main ED

## 2022-04-14 ENCOUNTER — TELEPHONE (OUTPATIENT)
Dept: NEUROLOGY | Facility: CLINIC | Age: 37
End: 2022-04-14

## 2022-04-14 NOTE — TELEPHONE ENCOUNTER
----- Message from Andria Gee RN sent at 4/13/2022  5:17 PM CDT -----    ----- Message -----  From: Torsten Juarez  Sent: 4/13/2022   4:56 PM CDT  To: Preethi Parra Staff    Patient called to speak w/ someone in regards to medication to help treat her symptoms from Covid. Requesting callback [phone]

## 2022-04-14 NOTE — TELEPHONE ENCOUNTER
"Returned call. Pt was dx with covid on 4/13, but started having symptoms 3 days ago. Symptoms include sore throat, headache and cough. Pt state symptoms are "not bad." Pt inquiring about Paxlovid. Pt aware I will send her message to Dr. Oro to review. In the meantime, recommended she reach out to her pcp for covid symptoms management.  "

## 2022-04-15 ENCOUNTER — PATIENT MESSAGE (OUTPATIENT)
Dept: NEUROLOGY | Facility: CLINIC | Age: 37
End: 2022-04-15
Payer: MEDICAID

## 2022-04-20 ENCOUNTER — PATIENT MESSAGE (OUTPATIENT)
Dept: NEUROLOGY | Facility: CLINIC | Age: 37
End: 2022-04-20
Payer: MEDICAID

## 2022-06-14 ENCOUNTER — TELEPHONE (OUTPATIENT)
Dept: NEUROLOGY | Facility: CLINIC | Age: 37
End: 2022-06-14

## 2022-06-14 DIAGNOSIS — G35 MULTIPLE SCLEROSIS: Primary | ICD-10-CM

## 2022-06-24 ENCOUNTER — PATIENT MESSAGE (OUTPATIENT)
Dept: PSYCHIATRY | Facility: CLINIC | Age: 37
End: 2022-06-24
Payer: MEDICAID

## 2022-06-27 ENCOUNTER — LAB VISIT (OUTPATIENT)
Dept: PRIMARY CARE CLINIC | Facility: CLINIC | Age: 37
End: 2022-06-27
Payer: MEDICAID

## 2022-06-27 ENCOUNTER — OFFICE VISIT (OUTPATIENT)
Dept: PRIMARY CARE CLINIC | Facility: CLINIC | Age: 37
End: 2022-06-27
Payer: MEDICAID

## 2022-06-27 VITALS
HEART RATE: 94 BPM | SYSTOLIC BLOOD PRESSURE: 104 MMHG | HEIGHT: 64 IN | BODY MASS INDEX: 28.68 KG/M2 | RESPIRATION RATE: 16 BRPM | WEIGHT: 168 LBS | DIASTOLIC BLOOD PRESSURE: 72 MMHG | OXYGEN SATURATION: 99 %

## 2022-06-27 DIAGNOSIS — G35 MULTIPLE SCLEROSIS: Primary | ICD-10-CM

## 2022-06-27 DIAGNOSIS — M79.2 NEUROPATHIC PAIN: ICD-10-CM

## 2022-06-27 DIAGNOSIS — Z31.89 ENCOUNTER FOR FERTILITY PLANNING: ICD-10-CM

## 2022-06-27 DIAGNOSIS — G35 MULTIPLE SCLEROSIS: ICD-10-CM

## 2022-06-27 LAB
BASOPHILS # BLD AUTO: 0.05 K/UL (ref 0–0.2)
BASOPHILS NFR BLD: 0.5 % (ref 0–1.9)
DIFFERENTIAL METHOD: ABNORMAL
EOSINOPHIL # BLD AUTO: 0.1 K/UL (ref 0–0.5)
EOSINOPHIL NFR BLD: 1 % (ref 0–8)
ERYTHROCYTE [DISTWIDTH] IN BLOOD BY AUTOMATED COUNT: 13.2 % (ref 11.5–14.5)
HCT VFR BLD AUTO: 45.4 % (ref 37–48.5)
HGB BLD-MCNC: 14.6 G/DL (ref 12–16)
IGA SERPL-MCNC: 116 MG/DL (ref 40–350)
IGG SERPL-MCNC: 615 MG/DL (ref 650–1600)
IGM SERPL-MCNC: 63 MG/DL (ref 50–300)
IMM GRANULOCYTES # BLD AUTO: 0.05 K/UL (ref 0–0.04)
IMM GRANULOCYTES NFR BLD AUTO: 0.5 % (ref 0–0.5)
LYMPHOCYTES # BLD AUTO: 3 K/UL (ref 1–4.8)
LYMPHOCYTES NFR BLD: 27 % (ref 18–48)
MCH RBC QN AUTO: 31.1 PG (ref 27–31)
MCHC RBC AUTO-ENTMCNC: 32.2 G/DL (ref 32–36)
MCV RBC AUTO: 97 FL (ref 82–98)
MONOCYTES # BLD AUTO: 0.7 K/UL (ref 0.3–1)
MONOCYTES NFR BLD: 6.1 % (ref 4–15)
NEUTROPHILS # BLD AUTO: 7.2 K/UL (ref 1.8–7.7)
NEUTROPHILS NFR BLD: 64.9 % (ref 38–73)
NRBC BLD-RTO: 0 /100 WBC
PLATELET # BLD AUTO: 299 K/UL (ref 150–450)
PMV BLD AUTO: 11.9 FL (ref 9.2–12.9)
RBC # BLD AUTO: 4.7 M/UL (ref 4–5.4)
WBC # BLD AUTO: 11.07 K/UL (ref 3.9–12.7)

## 2022-06-27 PROCEDURE — 36415 COLL VENOUS BLD VENIPUNCTURE: CPT | Mod: PBBFAC,PN

## 2022-06-27 PROCEDURE — 1160F PR REVIEW ALL MEDS BY PRESCRIBER/CLIN PHARMACIST DOCUMENTED: ICD-10-PCS | Mod: CPTII,,, | Performed by: FAMILY MEDICINE

## 2022-06-27 PROCEDURE — 36415 COLL VENOUS BLD VENIPUNCTURE: CPT | Performed by: CLINICAL NURSE SPECIALIST

## 2022-06-27 PROCEDURE — 87340 HEPATITIS B SURFACE AG IA: CPT | Performed by: CLINICAL NURSE SPECIALIST

## 2022-06-27 PROCEDURE — 99213 OFFICE O/P EST LOW 20 MIN: CPT | Mod: PBBFAC,PN | Performed by: FAMILY MEDICINE

## 2022-06-27 PROCEDURE — 3078F PR MOST RECENT DIASTOLIC BLOOD PRESSURE < 80 MM HG: ICD-10-PCS | Mod: CPTII,,, | Performed by: FAMILY MEDICINE

## 2022-06-27 PROCEDURE — 3008F BODY MASS INDEX DOCD: CPT | Mod: CPTII,,, | Performed by: FAMILY MEDICINE

## 2022-06-27 PROCEDURE — 1160F RVW MEDS BY RX/DR IN RCRD: CPT | Mod: CPTII,,, | Performed by: FAMILY MEDICINE

## 2022-06-27 PROCEDURE — 1159F MED LIST DOCD IN RCRD: CPT | Mod: CPTII,,, | Performed by: FAMILY MEDICINE

## 2022-06-27 PROCEDURE — 3078F DIAST BP <80 MM HG: CPT | Mod: CPTII,,, | Performed by: FAMILY MEDICINE

## 2022-06-27 PROCEDURE — 3074F PR MOST RECENT SYSTOLIC BLOOD PRESSURE < 130 MM HG: ICD-10-PCS | Mod: CPTII,,, | Performed by: FAMILY MEDICINE

## 2022-06-27 PROCEDURE — 3074F SYST BP LT 130 MM HG: CPT | Mod: CPTII,,, | Performed by: FAMILY MEDICINE

## 2022-06-27 PROCEDURE — 99203 OFFICE O/P NEW LOW 30 MIN: CPT | Mod: S$PBB,,, | Performed by: FAMILY MEDICINE

## 2022-06-27 PROCEDURE — 99999 PR PBB SHADOW E&M-EST. PATIENT-LVL III: CPT | Mod: PBBFAC,,, | Performed by: FAMILY MEDICINE

## 2022-06-27 PROCEDURE — 99999 PR PBB SHADOW E&M-EST. PATIENT-LVL III: ICD-10-PCS | Mod: PBBFAC,,, | Performed by: FAMILY MEDICINE

## 2022-06-27 PROCEDURE — 82784 ASSAY IGA/IGD/IGG/IGM EACH: CPT | Performed by: CLINICAL NURSE SPECIALIST

## 2022-06-27 PROCEDURE — 99203 PR OFFICE/OUTPT VISIT, NEW, LEVL III, 30-44 MIN: ICD-10-PCS | Mod: S$PBB,,, | Performed by: FAMILY MEDICINE

## 2022-06-27 PROCEDURE — 86704 HEP B CORE ANTIBODY TOTAL: CPT | Performed by: CLINICAL NURSE SPECIALIST

## 2022-06-27 PROCEDURE — 1159F PR MEDICATION LIST DOCUMENTED IN MEDICAL RECORD: ICD-10-PCS | Mod: CPTII,,, | Performed by: FAMILY MEDICINE

## 2022-06-27 PROCEDURE — 86706 HEP B SURFACE ANTIBODY: CPT | Performed by: CLINICAL NURSE SPECIALIST

## 2022-06-27 PROCEDURE — 85025 COMPLETE CBC W/AUTO DIFF WBC: CPT | Performed by: CLINICAL NURSE SPECIALIST

## 2022-06-27 PROCEDURE — 3008F PR BODY MASS INDEX (BMI) DOCUMENTED: ICD-10-PCS | Mod: CPTII,,, | Performed by: FAMILY MEDICINE

## 2022-06-27 RX ORDER — GABAPENTIN 300 MG/1
300 CAPSULE ORAL 3 TIMES DAILY
COMMUNITY
Start: 2022-03-05

## 2022-06-27 RX ORDER — ASPIRIN 325 MG
50000 TABLET, DELAYED RELEASE (ENTERIC COATED) ORAL WEEKLY
Qty: 12 CAPSULE | Refills: 1 | Status: SHIPPED | OUTPATIENT
Start: 2022-06-27 | End: 2023-05-29 | Stop reason: SDUPTHER

## 2022-06-27 RX ORDER — METHOCARBAMOL 500 MG/1
500 TABLET, FILM COATED ORAL 4 TIMES DAILY PRN
Qty: 30 TABLET | Refills: 2 | Status: SHIPPED | OUTPATIENT
Start: 2022-06-27 | End: 2023-12-04 | Stop reason: SDUPTHER

## 2022-06-27 RX ORDER — BUPROPION HCL 300 MG
300 TABLET, EXTENDED RELEASE 24 HR ORAL EVERY MORNING
COMMUNITY
Start: 2022-06-15

## 2022-06-27 NOTE — PROGRESS NOTES
Subjective:       Patient ID: Cari Barillas is a 36 y.o. female here for a new visit.    Chief Complaint: No chief complaint on file.    Pt with hx of MS, anxiety, vit D insufficiency, JUNIOR here to establish care.     MS - Pt is followed by Dr. Oro with neurology. Pt states her MS symptoms are currently well controlled. Pt is on ocrelizumab (Ocrevus IV) q6 mo for her MS treatment. Pt has to get labs done prior to receiving infusion but was unable to get them done recently because of transportation issues. Will get labs done in clinic today.     Birth control - Pt missed last OB/GYN appointment due to transportation issues and states she is going to reschedule. Pt wanted to discuss possible birth control options. She believes she has tried the copper IUD before but it moved and caused significant pain and increased bleeding. She tried hormonal contraceptives like the NuvaRing, Lo Loestrin, and another contraceptive pill, but she did not tolerate them well. Pt has no plans to be pregnant currently or in the future.  No currently sexually active.     Review of Systems   Constitutional: Negative for chills and fever.   HENT: Negative for nasal congestion, sinus pressure/congestion and sore throat.    Eyes: Negative for pain, discharge and redness.   Respiratory: Negative for chest tightness, shortness of breath and wheezing.    Cardiovascular: Negative for chest pain.   Gastrointestinal: Negative for abdominal pain, constipation, diarrhea, nausea and vomiting.   Genitourinary: Negative.    Musculoskeletal: Negative.    Neurological: Negative for dizziness, syncope and numbness.   Psychiatric/Behavioral: Negative.          Objective:      Physical Exam  Vitals and nursing note reviewed.   Constitutional:       General: She is not in acute distress.     Appearance: Normal appearance. She is not ill-appearing or toxic-appearing.   HENT:      Head: Normocephalic and atraumatic.      Nose: Nose normal. No congestion  or rhinorrhea.      Mouth/Throat:      Pharynx: Oropharynx is clear.   Eyes:      General:         Right eye: No discharge.         Left eye: No discharge.      Extraocular Movements: Extraocular movements intact.      Conjunctiva/sclera: Conjunctivae normal.   Cardiovascular:      Rate and Rhythm: Normal rate.      Heart sounds: Normal heart sounds. No murmur heard.    No friction rub. No gallop.   Pulmonary:      Effort: Pulmonary effort is normal. No respiratory distress.      Breath sounds: Normal breath sounds. No stridor. No wheezing.   Abdominal:      General: There is no distension.      Palpations: Abdomen is soft.      Tenderness: There is no abdominal tenderness. There is no guarding.   Musculoskeletal:         General: Normal range of motion.      Cervical back: Normal range of motion.   Skin:     General: Skin is warm and dry.   Neurological:      General: No focal deficit present.      Mental Status: She is alert and oriented to person, place, and time.   Psychiatric:         Mood and Affect: Mood normal.         Behavior: Behavior normal.         Thought Content: Thought content normal.         Judgment: Judgment normal.         Assessment:       Problem List Items Addressed This Visit        Neuro    Multiple sclerosis - Primary    Relevant Medications    cholecalciferol, vitamin D3, 1,250 mcg (50,000 unit) capsule    Neuropathic pain    Relevant Medications    methocarbamoL (ROBAXIN) 500 MG Tab      Other Visit Diagnoses     Encounter for fertility planning              Plan:       1. Multiple sclerosis  - cholecalciferol, vitamin D3, 1,250 mcg (50,000 unit) capsule; Take 1 capsule (50,000 Units total) by mouth once a week.  Dispense: 12 capsule; Refill: 1  - Ocrelizumab (Ocrevus IV) treatment q6mo - tolerating well, labs drawn today  - Followed by Dr. Oro with neurology    2. Neuropathic pain  - methocarbamoL (ROBAXIN) 500 MG Tab; Take 1 tablet (500 mg total) by mouth 4 (four) times daily as  needed (muscle pain (TMJ)).  Dispense: 30 tablet; Refill: 2    3. Encounter for fertility planning  - Educated on possible birth control options including hormonal IUD, implant, OCPs, NuvaRing, Depo-Provera; pt has no current desire for pregnancy and hesitant to try any hormonal therapies due to side effects  - Pt to re-schedule appointment with OB/GYN           GABO De Jesus  [facility]   Physician Assistant Student      I saw and evaluated the patient and discussed the care with  GABO De Jesus. I agree with the findings and plan as documented in the note above.      Gurwinder Michael MD

## 2022-07-07 NOTE — PROGRESS NOTES
Subjective:          Patient ID: Cari Barillas is a 36 y.o. female who presents today for a routine clinic visit for MS.  She was last seen in January 2022. The history has been provided by the patient.       MS HPI:  · DMT: ocrelizumab due in late July  · Side effects from DMT? No  · Taking vitamin D3 as recommended? Yes -  Dose: 50,000 units weekly   She had COVID in April. When she was sick, she had worsening MS symptoms. She also feels worse with MS symptoms when she has her period.   She denies any new symptoms.   She feels stable on Ocrevus.   She is heat sensitive, and her tingling increases when overheated.    She discontinued Lyrica. Ultimately, it doesn't fit her lifestyle (can't drink on it). She takes gabapentin only when needed.   She tried medical marijuana, but she actually felt like it made her symptoms worse.   She is having more good days than bad days, in general.     Medications:  Current Outpatient Medications   Medication Sig    cholecalciferol, vitamin D3, 1,250 mcg (50,000 unit) capsule Take 1 capsule (50,000 Units total) by mouth once a week.    gabapentin (NEURONTIN) 300 MG capsule Take 300 mg by mouth 3 (three) times daily.    LORazepam (ATIVAN) 0.5 MG tablet Take 1 tablet (0.5 mg total) by mouth every 8 (eight) hours as needed for Anxiety.    magnesium 30 mg Tab Take 250 mg by mouth once.    methocarbamoL (ROBAXIN) 500 MG Tab Take 1 tablet (500 mg total) by mouth 4 (four) times daily as needed (muscle pain (TMJ)).    ocrelizumab (OCREVUS IV) Inject into the vein.    WELLBUTRIN  mg 24 hr tablet Take 300 mg by mouth every morning.     SOCIAL HISTORY  Social History     Tobacco Use    Smoking status: Current Every Day Smoker     Packs/day: 0.50    Smokeless tobacco: Never Used   Substance Use Topics    Alcohol use: Not Currently    Drug use: Not Currently     Types: Marijuana, LSD, Cocaine, MDMA (Ecstacy)     Living arrangements - the patient lives  alone.    ROS:  REVIEW OF SYMPTOMS 7/8/22    Do you feel abnormally tired on most days? Yes   Do you feel you generally sleep well? No--she has trouble falling asleep; she sleeps about 5 hours    Do you have difficulty controlling your bladder?  No   Do you have difficulty controlling your bowels?  No--only if she eats gluten    Do you have frequent muscle cramps, tightness or spasms in your limbs?  Yes--mostly at night when she is tired    Do you have new visual symptoms?  No   Do you have worsening difficulty with your memory or thinking? No   Do you have worsening symptoms of anxiety or depression?  No   For patients who walk, Do you have more difficulty walking?  No; more off balance on some days    Have you fallen since your last visit?  No   For patients who use wheelchairs: Do you have any skin wounds or breakdown? Not Applicable   Do you have difficulty using your hands?  No--hands get tingly after a night of work, which affects her dexterity. Drops things sometimes.    Do you have shooting or burning pain? Yes   Do you have difficulty with sexual function?  No   If you are sexually active, are you using birth control? Y/N  N/A No   Do you often choke when swallowing liquids or solid food?  No   Do you experience worsening symptoms when overheated? Yes--makes her feel tired and drained    Do you need any new equipment such as a wheelchair, walker or shower chair? No-feels like she might need a cane on her bad days    Do you receive co-pay financial assistance for your principal MS medicine? No   Would you be interested in participating in an MS research trial in the future? Yes   For patients on Gilenya, Tecfidera, Aubagio, Rituxan, Ocrevus, Tysabri, Lemtrada or Methotrexate, are you aware that you should NOT receive live virus vaccines?  Yes   Do you feel you have adequate family/friend support?  Yes   Do you have health insurance?   Yes   Are you currently employed? Yes   Do you receive SSDI/SSI?  Not  Applicable   Do you use marijuana or cannabis products? No   Have you been diagnosed with a urinary tract infection since your last visit here? No   Have you been diagnosed with a respiratory tract infection since your last visit here? No   Have you been to the emergency room since your last visit here? No   Have you been hospitalized since your last visit here?  No            Objective:        1. 25 foot timed walk:  Timed 25 Foot Walk: 2022   Did patient wear an AFO? No No   Was assistive device used? No No   Time for 25 Foot Walk (seconds) 3.8 4.2   Time for 25 Foot Walk (seconds) 3.8 4     Neurologic Exam     Mental Status   Oriented to person, place, and time.   Attention: normal. Concentration: normal.   Speech: speech is normal   Level of consciousness: alert  Knowledge: good.   Normal comprehension.     Cranial Nerves     CN II   Visual acuity: normal    CN III, IV, VI   Pupils are equal, round, and reactive to light.  Extraocular motions are normal.     CN V   Facial sensation intact.     CN VII   Facial expression full, symmetric.     CN VIII   Hearing: intact (to finger rub)    CN IX, X   Palate: symmetric    CN XI   CN XI normal.     CN XII   Tongue deviation: none    Motor Exam   Muscle bulk: normal  Right arm tone: normal  Left arm tone: normal  Right leg tone: increased  Left leg tone: increased    Strength   Right neck flexion: 5/5  Left neck flexion: 5/5  Right neck extension: 5/5  Left neck extension: 5/5  Right deltoid: 5/5  Left deltoid: 5/5  Right biceps: 5/5  Left biceps: 5/5  Right triceps: 5/5  Left triceps: 5/5  Right wrist flexion: 5/5  Left wrist flexion: 5/5  Right wrist extension: 5/5  Left wrist extension: 5/5  Right interossei: 5/5  Left interossei: 5/5  Right iliopsoas: 5/5  Left iliopsoas: 5/5  Right quadriceps: 5/5  Left quadriceps: 5/5  Right hamstrin/5  Left hamstrin/5  Right anterior tibial: 5/5  Left anterior tibial: 5/5  Right gastroc: 5/5  Left gastroc:  5/5    Sensory Exam   Right arm vibration: normal  Left arm vibration: normal  Right leg vibration: decreased from toes  Left leg vibration: decreased from toes    Gait, Coordination, and Reflexes     Gait  Gait: (short quick steps)    Coordination   Tandem walking coordination: normal    Reflexes   Right brachioradialis: 3+  Left brachioradialis: 3+  Right biceps: 3+  Left biceps: 3+  Right triceps: 3+  Left triceps: 3+  Right patellar: 3+  Left patellar: 3+  Right achilles: 3+  Left achilles: 3+  Right plantar: normal  Left plantar: normal  She can walk on toes and heels and hop on each foot ten times.   Normal RSM in UE and LE       Imaging:       Results for orders placed during the hospital encounter of 12/07/21    MRI Brain Demyelinating W W/O Contrast    Impression  No significant change from prior.  Few scattered small to punctate foci of T2 FLAIR signal hyperintensity supratentorial white matter while nonspecific remain concerning for prior areas of demyelination in light of history    No definite new lesion or enhancing lesion to suggest significant interval or active demyelination.    Clinical correlation and follow-up advised.      Electronically signed by: Mal Wiggins DO  Date:    12/08/2021  Time:    07:44    Results for orders placed during the hospital encounter of 12/07/21    MRI Cervical Spine Demyelinating W W/O Contrast    Impression  Previous partially visualized T2 stir signal abnormality within the cervical cord C6/C7 on recent thoracic spine imaging no longer seen.  There is no current cord signal abnormality cervical or thoracic cord or abnormal enhancement to suggest definite interval or active demyelination in light of history..    No significant disc bulge, central canal or neural foraminal stenosis at the cervical or thoracic canal.      Electronically signed by: Mal Wiggins DO  Date:    12/08/2021  Time:    08:23    Results for orders placed during the hospital encounter of  12/07/21    MRI Thoracic Spine Demyelinating W W/O Contrast    Impression  Previous partially visualized T2 stir signal abnormality within the cervical cord C6/C7 on recent thoracic spine imaging no longer seen.  There is no current cord signal abnormality cervical or thoracic cord or abnormal enhancement to suggest definite interval or active demyelination in light of history..    No significant disc bulge, central canal or neural foraminal stenosis at the cervical or thoracic canal.      Electronically signed by: Mal Wiggins DO  Date:    12/08/2021  Time:    08:23        Labs:     Lab Results   Component Value Date    QJXLCPJW96KG 26 (L) 01/18/2022    [id number] 24 (L) 04/14/2021    KNUBLTDT60KN 18 (L) 06/11/2020     Lab Results   Component Value Date    JCVINDEX 0.66 (H) 06/24/2020    JCVANTIBODY POSITIVE (A) 06/24/2020     Lab Results   Component Value Date    QR4IKLZD 82.3 06/24/2020    ABSOLUTECD3 2529.0 (H) 06/24/2020    WB6KMRGD 15.6 06/24/2020    ABSOLUTECD8 481.0 06/24/2020    IW1QXDJO 66.1 (H) 06/24/2020    ABSOLUTECD4 2031.0 (H) 06/24/2020    LABCD48 4.22 (H) 06/24/2020     Lab Results   Component Value Date    WBC 11.07 06/27/2022    RBC 4.70 06/27/2022    HGB 14.6 06/27/2022    HCT 45.4 06/27/2022    MCV 97 06/27/2022    MCH 31.1 (H) 06/27/2022    MCHC 32.2 06/27/2022    RDW 13.2 06/27/2022     06/27/2022    MPV 11.9 06/27/2022    GRAN 7.2 06/27/2022    GRAN 64.9 06/27/2022    LYMPH 3.0 06/27/2022    LYMPH 27.0 06/27/2022    MONO 0.7 06/27/2022    MONO 6.1 06/27/2022    EOS 0.1 06/27/2022    BASO 0.05 06/27/2022    EOSINOPHIL 1.0 06/27/2022    BASOPHIL 0.5 06/27/2022     Sodium   Date Value Ref Range Status   01/18/2022 137 136 - 145 mmol/L Final     Potassium   Date Value Ref Range Status   01/18/2022 4.0 3.5 - 5.1 mmol/L Final     Chloride   Date Value Ref Range Status   01/18/2022 105 95 - 110 mmol/L Final     CO2   Date Value Ref Range Status   01/18/2022 24 23 - 29 mmol/L Final      Glucose   Date Value Ref Range Status   01/18/2022 81 70 - 110 mg/dL Final     BUN   Date Value Ref Range Status   01/18/2022 8 6 - 20 mg/dL Final     Creatinine   Date Value Ref Range Status   01/18/2022 0.7 0.5 - 1.4 mg/dL Final     Calcium   Date Value Ref Range Status   01/18/2022 9.4 8.7 - 10.5 mg/dL Final     Total Protein   Date Value Ref Range Status   01/18/2022 6.7 6.0 - 8.4 g/dL Final     Albumin   Date Value Ref Range Status   01/18/2022 3.8 3.5 - 5.2 g/dL Final     Total Bilirubin   Date Value Ref Range Status   01/18/2022 0.3 0.1 - 1.0 mg/dL Final     Comment:     For infants and newborns, interpretation of results should be based  on gestational age, weight and in agreement with clinical  observations.    Premature Infant recommended reference ranges:  Up to 24 hours.............<8.0 mg/dL  Up to 48 hours............<12.0 mg/dL  3-5 days..................<15.0 mg/dL  6-29 days.................<15.0 mg/dL       Alkaline Phosphatase   Date Value Ref Range Status   01/18/2022 83 55 - 135 U/L Final     AST   Date Value Ref Range Status   01/18/2022 14 10 - 40 U/L Final     ALT   Date Value Ref Range Status   01/18/2022 16 10 - 44 U/L Final     Anion Gap   Date Value Ref Range Status   01/18/2022 8 8 - 16 mmol/L Final     eGFR if    Date Value Ref Range Status   01/18/2022 >60.0 >60 mL/min/1.73 m^2 Final     eGFR if non    Date Value Ref Range Status   01/18/2022 >60.0 >60 mL/min/1.73 m^2 Final     Comment:     Calculation used to obtain the estimated glomerular filtration  rate (eGFR) is the CKD-EPI equation.        Lab Results   Component Value Date    HEPBSAG Negative 06/27/2022    HEPBSAB Negative 06/27/2022    HEPBCAB Negative 06/27/2022       MS Impression and Plan:     NEURO MULTIPLE SCLEROSIS IMPRESSION:   MS Status:     Number of relapses in the past year?:  0    Clinical Progression:  Clinically Stable  Plan:     DMT:  No change in management    DMT comment:   Continue Ocrevus and Vitamin D. Her next infusion is due this month. Labs have been reviewed. She is aware of the risks associated with immunosuppressant therapy, including increased risk of infection.   We discussed COVID vaccine schedule and Evusheld. Evusheld order was placed.       Symptom Management:  Implement change in symptom management    Implement Change in Symptom Management:  Adaptive Needs and Sleep ([employer] equipment program application and cooling vest application provided to the patient.  Discussed trying magnesium glycinate an hour before bed for sleep. )      MRI is planned in early December. She will follow up with Dr. Becerra after MRI.     Total time spent with patient: 34 minutes   Total time spent on encounter: 42 minutes       Problem List Items Addressed This Visit        Neurologic Problems    Multiple sclerosis - Primary    Overview     Patient on Ocrevus and stable           Relevant Orders    HME - OTHER    Ambulatory referral/consult Order for Evusheld    MRI Brain Demyelinating Without Contrast      Other Visit Diagnoses     Immunosuppression        Relevant Orders    Ambulatory referral/consult Order for Evusheld    Counseling regarding goals of care        Prophylactic immunotherapy        High risk medication use                  ROBYN Abrue, CNS

## 2022-07-08 ENCOUNTER — OFFICE VISIT (OUTPATIENT)
Dept: NEUROLOGY | Facility: CLINIC | Age: 37
End: 2022-07-08
Payer: MEDICAID

## 2022-07-08 VITALS
SYSTOLIC BLOOD PRESSURE: 110 MMHG | BODY MASS INDEX: 28.6 KG/M2 | HEART RATE: 94 BPM | WEIGHT: 167.56 LBS | DIASTOLIC BLOOD PRESSURE: 81 MMHG | HEIGHT: 64 IN

## 2022-07-08 DIAGNOSIS — Z29.89 PROPHYLACTIC IMMUNOTHERAPY: ICD-10-CM

## 2022-07-08 DIAGNOSIS — D84.9 IMMUNOSUPPRESSION: ICD-10-CM

## 2022-07-08 DIAGNOSIS — Z71.89 COUNSELING REGARDING GOALS OF CARE: ICD-10-CM

## 2022-07-08 DIAGNOSIS — Z79.899 HIGH RISK MEDICATION USE: ICD-10-CM

## 2022-07-08 DIAGNOSIS — G35 MULTIPLE SCLEROSIS: Primary | ICD-10-CM

## 2022-07-08 PROCEDURE — 3079F PR MOST RECENT DIASTOLIC BLOOD PRESSURE 80-89 MM HG: ICD-10-PCS | Mod: CPTII,,, | Performed by: CLINICAL NURSE SPECIALIST

## 2022-07-08 PROCEDURE — 99214 OFFICE O/P EST MOD 30 MIN: CPT | Mod: PBBFAC | Performed by: CLINICAL NURSE SPECIALIST

## 2022-07-08 PROCEDURE — 99214 OFFICE O/P EST MOD 30 MIN: CPT | Mod: S$PBB,,, | Performed by: CLINICAL NURSE SPECIALIST

## 2022-07-08 PROCEDURE — 3074F PR MOST RECENT SYSTOLIC BLOOD PRESSURE < 130 MM HG: ICD-10-PCS | Mod: CPTII,,, | Performed by: CLINICAL NURSE SPECIALIST

## 2022-07-08 PROCEDURE — 3079F DIAST BP 80-89 MM HG: CPT | Mod: CPTII,,, | Performed by: CLINICAL NURSE SPECIALIST

## 2022-07-08 PROCEDURE — 99999 PR PBB SHADOW E&M-EST. PATIENT-LVL IV: ICD-10-PCS | Mod: PBBFAC,,, | Performed by: CLINICAL NURSE SPECIALIST

## 2022-07-08 PROCEDURE — 1159F PR MEDICATION LIST DOCUMENTED IN MEDICAL RECORD: ICD-10-PCS | Mod: CPTII,,, | Performed by: CLINICAL NURSE SPECIALIST

## 2022-07-08 PROCEDURE — 3074F SYST BP LT 130 MM HG: CPT | Mod: CPTII,,, | Performed by: CLINICAL NURSE SPECIALIST

## 2022-07-08 PROCEDURE — 99999 PR PBB SHADOW E&M-EST. PATIENT-LVL IV: CPT | Mod: PBBFAC,,, | Performed by: CLINICAL NURSE SPECIALIST

## 2022-07-08 PROCEDURE — 99214 PR OFFICE/OUTPT VISIT, EST, LEVL IV, 30-39 MIN: ICD-10-PCS | Mod: S$PBB,,, | Performed by: CLINICAL NURSE SPECIALIST

## 2022-07-08 PROCEDURE — 3008F BODY MASS INDEX DOCD: CPT | Mod: CPTII,,, | Performed by: CLINICAL NURSE SPECIALIST

## 2022-07-08 PROCEDURE — 1159F MED LIST DOCD IN RCRD: CPT | Mod: CPTII,,, | Performed by: CLINICAL NURSE SPECIALIST

## 2022-07-08 PROCEDURE — 3008F PR BODY MASS INDEX (BMI) DOCUMENTED: ICD-10-PCS | Mod: CPTII,,, | Performed by: CLINICAL NURSE SPECIALIST

## 2022-07-08 NOTE — Clinical Note
Nuha, can Massiel also do a letter of diagnosis for Cari and send to her through the portal (for [employer] equipment program)?  Thanks!

## 2022-07-08 NOTE — Clinical Note
She can be infused. She wants to infuse on either the 18th or the 20th. If that doesn't work, then the week of 8/4.

## 2022-07-08 NOTE — PATIENT INSTRUCTIONS
Magnesium glycinate 400mg an hour before bedtime     1st booster in late October 2022  2nd booster in late February 2023

## 2022-07-13 ENCOUNTER — TELEPHONE (OUTPATIENT)
Dept: PSYCHIATRY | Facility: CLINIC | Age: 37
End: 2022-07-13
Payer: MEDICAID

## 2022-07-13 ENCOUNTER — TELEPHONE (OUTPATIENT)
Dept: NEUROLOGY | Facility: CLINIC | Age: 37
End: 2022-07-13
Payer: MEDICAID

## 2022-07-13 NOTE — TELEPHONE ENCOUNTER
----- Message from Deisy Rivers sent at 7/13/2022  3:37 PM CDT -----  MALI ROBERTSON calling regarding Patient Advice (message) for infusion.  She stated she is still waiting for someone to call her to schedule.  [phone]

## 2022-07-14 ENCOUNTER — TELEPHONE (OUTPATIENT)
Dept: NEUROLOGY | Facility: CLINIC | Age: 37
End: 2022-07-14
Payer: MEDICAID

## 2022-07-14 NOTE — TELEPHONE ENCOUNTER
----- Message from ROBYN Dorsey, CNS sent at 7/9/2022  7:36 AM CDT -----  She can be infused. She wants to infuse on either the 18th or the 20th. If that doesn't work, then the week of 8/4.

## 2022-07-15 ENCOUNTER — TELEPHONE (OUTPATIENT)
Dept: PSYCHIATRY | Facility: CLINIC | Age: 37
End: 2022-07-15
Payer: MEDICAID

## 2022-07-20 ENCOUNTER — INFUSION (OUTPATIENT)
Dept: INFUSION THERAPY | Facility: HOSPITAL | Age: 37
End: 2022-07-20
Attending: PSYCHIATRY & NEUROLOGY
Payer: MEDICAID

## 2022-07-20 VITALS
RESPIRATION RATE: 16 BRPM | DIASTOLIC BLOOD PRESSURE: 64 MMHG | SYSTOLIC BLOOD PRESSURE: 106 MMHG | OXYGEN SATURATION: 96 % | HEART RATE: 90 BPM | TEMPERATURE: 98 F

## 2022-07-20 DIAGNOSIS — G35 MULTIPLE SCLEROSIS: Primary | ICD-10-CM

## 2022-07-20 PROCEDURE — 96375 TX/PRO/DX INJ NEW DRUG ADDON: CPT

## 2022-07-20 PROCEDURE — 96374 THER/PROPH/DIAG INJ IV PUSH: CPT

## 2022-07-20 PROCEDURE — 96366 THER/PROPH/DIAG IV INF ADDON: CPT

## 2022-07-20 PROCEDURE — 96367 TX/PROPH/DG ADDL SEQ IV INF: CPT

## 2022-07-20 PROCEDURE — 25000003 PHARM REV CODE 250: Performed by: PSYCHIATRY & NEUROLOGY

## 2022-07-20 PROCEDURE — 63600175 PHARM REV CODE 636 W HCPCS: Mod: JG | Performed by: PSYCHIATRY & NEUROLOGY

## 2022-07-20 PROCEDURE — 96365 THER/PROPH/DIAG IV INF INIT: CPT

## 2022-07-20 RX ORDER — FAMOTIDINE 10 MG/ML
20 INJECTION INTRAVENOUS
Status: CANCELLED | OUTPATIENT
Start: 2022-12-28

## 2022-07-20 RX ORDER — SODIUM CHLORIDE 0.9 % (FLUSH) 0.9 %
10 SYRINGE (ML) INJECTION
Status: CANCELLED | OUTPATIENT
Start: 2022-12-28

## 2022-07-20 RX ORDER — FAMOTIDINE 10 MG/ML
20 INJECTION INTRAVENOUS
Status: COMPLETED | OUTPATIENT
Start: 2022-07-20 | End: 2022-07-20

## 2022-07-20 RX ORDER — ACETAMINOPHEN 500 MG
1000 TABLET ORAL
Status: COMPLETED | OUTPATIENT
Start: 2022-07-20 | End: 2022-07-20

## 2022-07-20 RX ORDER — ACETAMINOPHEN 500 MG
1000 TABLET ORAL
Status: CANCELLED | OUTPATIENT
Start: 2022-12-28

## 2022-07-20 RX ORDER — EPINEPHRINE 0.3 MG/.3ML
0.3 INJECTION SUBCUTANEOUS
Status: CANCELLED | OUTPATIENT
Start: 2022-12-28

## 2022-07-20 RX ORDER — DIPHENHYDRAMINE HYDROCHLORIDE 50 MG/ML
50 INJECTION INTRAMUSCULAR; INTRAVENOUS
Status: CANCELLED | OUTPATIENT
Start: 2022-12-28

## 2022-07-20 RX ORDER — HEPARIN 100 UNIT/ML
500 SYRINGE INTRAVENOUS
Status: CANCELLED | OUTPATIENT
Start: 2022-12-28

## 2022-07-20 RX ADMIN — OCRELIZUMAB 600 MG: 300 INJECTION INTRAVENOUS at 09:07

## 2022-07-20 RX ADMIN — ACETAMINOPHEN 1000 MG: 500 TABLET ORAL at 08:07

## 2022-07-20 RX ADMIN — FAMOTIDINE 20 MG: 10 INJECTION, SOLUTION INTRAVENOUS at 08:07

## 2022-07-20 RX ADMIN — DEXTROSE: 50 INJECTION, SOLUTION INTRAVENOUS at 08:07

## 2022-07-20 RX ADMIN — DIPHENHYDRAMINE HYDROCHLORIDE 50 MG: 50 INJECTION, SOLUTION INTRAMUSCULAR; INTRAVENOUS at 08:07

## 2022-07-20 NOTE — PLAN OF CARE
Pt received maintenance Ocrevus, 600mg. Oriented to unit. No new or worsening complaints to report since her last infusion. VSS. Plan of care reviewed. Received pre-meds of Tylenol, Pepcid IVP, Benadryl, and Methylprednisolone. Ocrevus initiated at 40 mL/hr until max rate of 200 was reached. No complaints voiced. Pt completed post infusion one-hour observation period. Receives appointments through [facility]. Discharged from unit in NAD.

## 2022-10-03 ENCOUNTER — OFFICE VISIT (OUTPATIENT)
Dept: GASTROENTEROLOGY | Facility: CLINIC | Age: 37
End: 2022-10-03
Payer: MEDICAID

## 2022-10-03 VITALS — WEIGHT: 161.5 LBS | SYSTOLIC BLOOD PRESSURE: 112 MMHG | BODY MASS INDEX: 27.72 KG/M2 | DIASTOLIC BLOOD PRESSURE: 68 MMHG

## 2022-10-03 DIAGNOSIS — R19.7 DIARRHEA, UNSPECIFIED TYPE: ICD-10-CM

## 2022-10-03 DIAGNOSIS — R11.2 NAUSEA AND VOMITING, UNSPECIFIED VOMITING TYPE: Primary | ICD-10-CM

## 2022-10-03 PROBLEM — R19.5 DARK STOOLS: Status: RESOLVED | Noted: 2021-10-04 | Resolved: 2022-10-03

## 2022-10-03 PROCEDURE — 3008F BODY MASS INDEX DOCD: CPT | Mod: CPTII,,, | Performed by: INTERNAL MEDICINE

## 2022-10-03 PROCEDURE — 99999 PR PBB SHADOW E&M-EST. PATIENT-LVL III: ICD-10-PCS | Mod: PBBFAC,,, | Performed by: INTERNAL MEDICINE

## 2022-10-03 PROCEDURE — 1160F RVW MEDS BY RX/DR IN RCRD: CPT | Mod: CPTII,,, | Performed by: INTERNAL MEDICINE

## 2022-10-03 PROCEDURE — 3078F PR MOST RECENT DIASTOLIC BLOOD PRESSURE < 80 MM HG: ICD-10-PCS | Mod: CPTII,,, | Performed by: INTERNAL MEDICINE

## 2022-10-03 PROCEDURE — 1160F PR REVIEW ALL MEDS BY PRESCRIBER/CLIN PHARMACIST DOCUMENTED: ICD-10-PCS | Mod: CPTII,,, | Performed by: INTERNAL MEDICINE

## 2022-10-03 PROCEDURE — 3074F PR MOST RECENT SYSTOLIC BLOOD PRESSURE < 130 MM HG: ICD-10-PCS | Mod: CPTII,,, | Performed by: INTERNAL MEDICINE

## 2022-10-03 PROCEDURE — 99213 OFFICE O/P EST LOW 20 MIN: CPT | Mod: PBBFAC,PO | Performed by: INTERNAL MEDICINE

## 2022-10-03 PROCEDURE — 99214 PR OFFICE/OUTPT VISIT, EST, LEVL IV, 30-39 MIN: ICD-10-PCS | Mod: S$PBB,,, | Performed by: INTERNAL MEDICINE

## 2022-10-03 PROCEDURE — 1159F MED LIST DOCD IN RCRD: CPT | Mod: CPTII,,, | Performed by: INTERNAL MEDICINE

## 2022-10-03 PROCEDURE — 3078F DIAST BP <80 MM HG: CPT | Mod: CPTII,,, | Performed by: INTERNAL MEDICINE

## 2022-10-03 PROCEDURE — 3074F SYST BP LT 130 MM HG: CPT | Mod: CPTII,,, | Performed by: INTERNAL MEDICINE

## 2022-10-03 PROCEDURE — 99999 PR PBB SHADOW E&M-EST. PATIENT-LVL III: CPT | Mod: PBBFAC,,, | Performed by: INTERNAL MEDICINE

## 2022-10-03 PROCEDURE — 3008F PR BODY MASS INDEX (BMI) DOCUMENTED: ICD-10-PCS | Mod: CPTII,,, | Performed by: INTERNAL MEDICINE

## 2022-10-03 PROCEDURE — 1159F PR MEDICATION LIST DOCUMENTED IN MEDICAL RECORD: ICD-10-PCS | Mod: CPTII,,, | Performed by: INTERNAL MEDICINE

## 2022-10-03 PROCEDURE — 99214 OFFICE O/P EST MOD 30 MIN: CPT | Mod: S$PBB,,, | Performed by: INTERNAL MEDICINE

## 2022-10-03 RX ORDER — DICYCLOMINE HYDROCHLORIDE 20 MG/1
20 TABLET ORAL 3 TIMES DAILY PRN
Qty: 90 TABLET | Refills: 2 | Status: SHIPPED | OUTPATIENT
Start: 2022-10-03 | End: 2023-05-29

## 2022-10-03 RX ORDER — FLUTICASONE PROPIONATE 50 MCG
1 SPRAY, SUSPENSION (ML) NASAL DAILY PRN
COMMUNITY
Start: 2022-08-04 | End: 2024-02-02 | Stop reason: SDUPTHER

## 2022-10-03 RX ORDER — PANTOPRAZOLE SODIUM 40 MG/1
40 TABLET, DELAYED RELEASE ORAL DAILY
Qty: 90 TABLET | Refills: 3 | Status: SHIPPED | OUTPATIENT
Start: 2022-10-03 | End: 2023-05-29

## 2022-10-03 RX ORDER — SODIUM, POTASSIUM,MAG SULFATES 17.5-3.13G
1 SOLUTION, RECONSTITUTED, ORAL ORAL DAILY
Qty: 1 KIT | Refills: 0 | Status: SHIPPED | OUTPATIENT
Start: 2022-10-03 | End: 2022-10-04

## 2022-10-03 NOTE — PATIENT INSTRUCTIONS
SUPREP Instructions    PLEASE FOLLOW THESE INSTRUCTIONS NOT THE INSTRUCTIONS ON THE BOX    You are scheduled for a EGD/colonoscopy with Dr. Fleming on Tuesday, October 25 at [facility] located at [address] [place] [address] in [place].  Enter through the South Entrance #1 (by the ED).  Go and check-in at Same Day Surgery.      You will receive a call 1-2 days before your colonoscopy to tell you the time to arrive.  If you have not received a call by the day before your procedure, call the Endoscopy Lab at [phone].    To ensure that your test is accurate and complete, you MUST follow these instructions listed below.  If you have any questions, please call our office at [phone].  Plan on being at the hospital for your procedure for 3-4 hours.    1.  Follow a CLEAR LIQUID DIET for the entire day before your scheduled colonoscopy.  This means no solid food the entire day starting when you wake.  You may have as much of the clear liquids as you want throughout the day.   CLEAR LIQUID DIET:   - NO DAIRY   - You can have:  Coffee with sugar (no creamer), tea, water, soda, apple or white grape juice, chicken or beef broth/bouillon (no meat, noodles, or veggies), green/yellow popsicles, green/yellow Jell-O, lemonade.    2.  AT 5 pm the evening before your colonoscopy, POUR ONE (1) BOTTLE OF SUPREP INTO THE MIXING CONTAINER, PROVIDED INSIDE THE BOX.  ADD WATER TO THE LINE ON THE CONTAINER AND MIX IT WELL.  DRINK THE ENTIRE CONTAINER AND THEN DRINK TWO (2) MORE CONTAINERS OF WATER OVER THE NEXT 1 HOUR.  This is sometimes easier to drink if this solution is cold, so you can mix the solution 20 minutes ahead of time and place in the refrigerator prior to drinking.  You have to drink the solution within 30-45 minutes of mixing it.  Do NOT put this solution over ice.  It IS ok to drink with a straw.    3.  The endoscopy department will call you 1 day before your colonoscopy to tell you the exact time to  arrive, AND to tell you the exact time to drink the 2nd portion of your prep (which will be FIVE HOURS BEFORE YOUR ARRIVAL TIME).  At this time given to you, POUR ONE (1) BOTTLE OF SUPREP INTO THE MIXING CONTAINER, PROVIDED INSIDE THE BOX.  ADD WATER TO THE LINE ON THE CONTAINER AND MIX IT WELL.  DRINK THE ENTIRE CONTAINER AND THEN DRINK TWO (2) MORE CONTAINERS OF WATER OVER THE NEXT 1 HOUR.  This is sometimes easier to drink if this solution is cold, so you can mix the solution 20 minutes ahead of time and place in the refrigerator prior to drinking.  You have to drink the solution within 30-45 minutes of mixing it.  Do NOT put this solution over ice.  It IS ok to drink with a straw.  Once this is complete, you may not have ANYTHING else by mouth!    4.  You must have someone with you to DRIVE YOU HOME since you will be receiving IV sedation for the colonoscopy.    5.  It is ok to take MOST of your REGULAR MEDICATIONS  in the morning of your test with a SIP of water.  THE ONLY MEDS YOU NEED TO HOLD ARE YOUR DIABETES MEDICATIONS,  SOME BLOOD PRESSURE MEDS, AND BLOOD THINNERS IF OK'D BY YOUR DOCTOR.  Do NOT have anything else to eat or drink the morning of your colonoscopy.  It is ok to brush your teeth.    6.  If you are on blood thinners THAT YOU HAVE BEEN INSTRUCTED TO HOLD BY YOUR DOCTOR FOR THIS PROCEDURE, then do NOT take this the morning of your colonoscopy.  Do NOT stop these medications on your own, they must be approved to be held by your doctor.  Your colonoscopy can NOT be done if you are on these medications.  Examples of blood thinners include: Coumadin, Aggrenox, Plavix, Pradaxa, Reapro, Pletal, Xarelto, Ticagrelor, Brilinta, Eliquis, and high dose aspirin (325 mg).  You do not have to stop baby aspirin 81 mg.    7.  IF YOU ARE DIABETIC:  NO INSULIN OR ORAL MEDICATIONS THE MORNING OF THE COLONOSCOPY.  TAKE ONLY HALF THE DOSE OF YOUR INSULIN THE DAY BEFORE THE COLONOSCOPY.  DO NOT TAKE ANY ORAL DIABETIC  MEDICATIONS THE DAY BEFORE THE COLONOSCOPY.  IF YOU ARE AN INSULIN DEPENDENT DIABETIC WITH UNSTABLE BLOOD SUGARS, NOTIFY YOUR PRIMARY CARE PHYSICIAN FOR INSTRUCTIONS.

## 2022-10-04 ENCOUNTER — LAB VISIT (OUTPATIENT)
Dept: LAB | Facility: OTHER | Age: 37
End: 2022-10-04
Attending: INTERNAL MEDICINE
Payer: MEDICAID

## 2022-10-04 DIAGNOSIS — R19.7 DIARRHEA, UNSPECIFIED TYPE: ICD-10-CM

## 2022-10-04 DIAGNOSIS — R79.89 ELEVATED LFTS: Primary | ICD-10-CM

## 2022-10-04 DIAGNOSIS — R11.2 NAUSEA AND VOMITING, UNSPECIFIED VOMITING TYPE: ICD-10-CM

## 2022-10-04 PROCEDURE — 87045 FECES CULTURE AEROBIC BACT: CPT | Performed by: INTERNAL MEDICINE

## 2022-10-04 PROCEDURE — 87329 GIARDIA AG IA: CPT | Performed by: INTERNAL MEDICINE

## 2022-10-04 PROCEDURE — 87209 SMEAR COMPLEX STAIN: CPT | Performed by: INTERNAL MEDICINE

## 2022-10-04 PROCEDURE — 82656 EL-1 FECAL QUAL/SEMIQ: CPT | Performed by: INTERNAL MEDICINE

## 2022-10-04 PROCEDURE — 87177 OVA AND PARASITES SMEARS: CPT | Performed by: INTERNAL MEDICINE

## 2022-10-04 PROCEDURE — 89055 LEUKOCYTE ASSESSMENT FECAL: CPT | Performed by: INTERNAL MEDICINE

## 2022-10-04 PROCEDURE — 87046 STOOL CULTR AEROBIC BACT EA: CPT | Mod: 59 | Performed by: INTERNAL MEDICINE

## 2022-10-04 PROCEDURE — 87427 SHIGA-LIKE TOXIN AG IA: CPT | Mod: 59 | Performed by: INTERNAL MEDICINE

## 2022-10-04 PROCEDURE — 82705 FATS/LIPIDS FECES QUAL: CPT | Performed by: INTERNAL MEDICINE

## 2022-10-04 NOTE — PROGRESS NOTES
"Subjective:       Patient ID: Cari Barillas is a 36 y.o. female.    Chief Complaint: Diarrhea, Emesis, and Hemorrhoids    35 yo F following up for rectal bleeding.  When seen virtually at initial visit she had seen red blood with stool one time.  She states today that since 9/11/22 she has had "constant diarrhea."  At its worst it was 3-4 times per day, always loose, and now is improving.  When the diarrhea started she had vomiting as well.  The vomiting was once but then recurred a few weeks later, also just once time.  Since the diarrhea began she has had nausea with all eating.  She took Imodium which would slow things down but did not prevent the diarrhea from recurring.  She has not needed Imodium in a few days.  She denies heartburn.    Review of Systems   Constitutional:  Negative for chills and fever.   Respiratory:  Negative for shortness of breath and wheezing.    Cardiovascular:  Negative for chest pain, palpitations and leg swelling.   Gastrointestinal:  Positive for diarrhea and nausea.   Neurological:  Negative for dizziness and speech difficulty.       Objective:      /68 (BP Location: Right arm, Patient Position: Sitting, BP Method: Medium (Manual))   Wt 73.3 kg (161 lb 8 oz)   BMI 27.72 kg/m²     Physical Exam  Constitutional:       Appearance: Normal appearance. She is well-developed. She is not ill-appearing.   HENT:      Head: Normocephalic and atraumatic.   Eyes:      Extraocular Movements: Extraocular movements intact.      Pupils: Pupils are equal, round, and reactive to light.   Pulmonary:      Effort: Pulmonary effort is normal. No respiratory distress.   Abdominal:      General: There is no distension.      Palpations: Abdomen is soft.   Musculoskeletal:         General: No signs of injury. Normal range of motion.      Cervical back: Normal range of motion and neck supple.   Neurological:      General: No focal deficit present.      Mental Status: She is alert and oriented to " person, place, and time.   Psychiatric:         Mood and Affect: Mood normal.         Behavior: Behavior normal.         Thought Content: Thought content normal.         Judgment: Judgment normal.       Lab Results   Component Value Date    WBC 14.74 (H) 10/03/2022    HGB 13.9 10/03/2022    HCT 40.1 10/03/2022    MCV 92 10/03/2022     10/03/2022       Assessment:       Problem List Items Addressed This Visit          GI    Nausea & vomiting - Primary    Relevant Medications    SUPREP BOWEL PREP KIT 17.5-3.13-1.6 gram SolR    pantoprazole (PROTONIX) 40 MG tablet    dicyclomine (BENTYL) 20 mg tablet    Other Relevant Orders    CBC Auto Differential (Completed)    Comprehensive Metabolic Panel (Completed)    Celiac Disease Panel    Case Request Endoscopy: EGD (ESOPHAGOGASTRODUODENOSCOPY), COLONOSCOPY (Completed)    Clostridium difficile EIA    Fecal fat, qualitative    Giardia / Cryptosporidum, EIA    Pancreatic elastase, fecal    Stool culture    Stool Exam-Ova,Cysts,Parasites    WBC, Stool    TSH (Completed)    Diarrhea    Relevant Medications    SUPREP BOWEL PREP KIT 17.5-3.13-1.6 gram SolR    pantoprazole (PROTONIX) 40 MG tablet    dicyclomine (BENTYL) 20 mg tablet    Other Relevant Orders    CBC Auto Differential (Completed)    Comprehensive Metabolic Panel (Completed)    Celiac Disease Panel    Case Request Endoscopy: EGD (ESOPHAGOGASTRODUODENOSCOPY), COLONOSCOPY (Completed)    Clostridium difficile EIA    Fecal fat, qualitative    Giardia / Cryptosporidum, EIA    Pancreatic elastase, fecal    Stool culture    Stool Exam-Ova,Cysts,Parasites    WBC, Stool    TSH (Completed)         Plan:       Nausea and vomiting, unspecified vomiting type; Diarrhea, unspecified type  -     CBC Auto Differential; Future; Expected date: 10/03/2022  -     Comprehensive Metabolic Panel; Future; Expected date: 10/03/2022  -     Celiac Disease Panel; Future; Expected date: 10/03/2022  -     TSH; Future; Expected date:  10/03/2022    -     Case Request Endoscopy: EGD (ESOPHAGOGASTRODUODENOSCOPY), COLONOSCOPY  -     SUPREP BOWEL PREP KIT 17.5-3.13-1.6 gram SolR; Take 177 mLs by mouth once daily. for 1 day  Dispense: 1 kit; Refill: 0    -     Clostridium difficile EIA; Future; Expected date: 10/03/2022  -     Fecal fat, qualitative; Future; Expected date: 10/03/2022  -     Giardia / Cryptosporidum, EIA; Future; Expected date: 10/03/2022  -     Pancreatic elastase, fecal; Future; Expected date: 10/03/2022  -     Stool culture; Future; Expected date: 10/03/2022  -     Stool Exam-Ova,Cysts,Parasites; Future; Expected date: 10/03/2022  -     WBC, Stool; Future; Expected date: 10/03/2022    -     pantoprazole (PROTONIX) 40 MG tablet; Take 1 tablet (40 mg total) by mouth once daily.  Dispense: 90 tablet; Refill: 3  -     dicyclomine (BENTYL) 20 mg tablet; Take 1 tablet (20 mg total) by mouth 3 (three) times daily as needed (abd pain or diarrhea).  Dispense: 90 tablet; Refill: 2

## 2022-10-05 ENCOUNTER — PATIENT MESSAGE (OUTPATIENT)
Dept: NEUROLOGY | Facility: CLINIC | Age: 37
End: 2022-10-05
Payer: MEDICAID

## 2022-10-05 ENCOUNTER — TELEPHONE (OUTPATIENT)
Dept: GASTROENTEROLOGY | Facility: CLINIC | Age: 37
End: 2022-10-05
Payer: MEDICAID

## 2022-10-05 LAB
CRYPTOSP AG STL QL IA: NEGATIVE
E COLI SXT1 STL QL IA: NEGATIVE
E COLI SXT2 STL QL IA: NEGATIVE
G LAMBLIA AG STL QL IA: NEGATIVE
WBC #/AREA STL HPF: NORMAL /[HPF]

## 2022-10-05 NOTE — TELEPHONE ENCOUNTER
----- Message from Yaa Sherman sent at 10/5/2022  2:37 AM CDT -----  Regarding: Lab Client Services  Contact: [phone]  Good Morning,    My name is Yaa Sherman I work in the lab. We received a specimen for Clostridium Difficile test. The test was unable to be performed due to the stool sample was formed stool. If you have any questions regarding this, please contact Lab Client Services at [phone].    Thank yo

## 2022-10-06 LAB — O+P STL MICRO: NORMAL

## 2022-10-07 LAB
BACTERIA STL CULT: NORMAL
ELASTASE 1, FECAL: >500 MCG/G

## 2022-10-09 LAB
FAT STL QL: NORMAL
NEUTRAL FAT STL QL: NORMAL

## 2022-10-20 ENCOUNTER — HOSPITAL ENCOUNTER (OUTPATIENT)
Dept: RADIOLOGY | Facility: OTHER | Age: 37
Discharge: HOME | End: 2022-10-20
Attending: INTERNAL MEDICINE
Payer: MEDICAID

## 2022-10-20 ENCOUNTER — PATIENT MESSAGE (OUTPATIENT)
Dept: NEUROLOGY | Facility: CLINIC | Age: 37
End: 2022-10-20
Payer: MEDICAID

## 2022-10-20 ENCOUNTER — PATIENT MESSAGE (OUTPATIENT)
Dept: PRIMARY CARE CLINIC | Facility: CLINIC | Age: 37
End: 2022-10-20
Payer: MEDICAID

## 2022-10-20 DIAGNOSIS — R79.89 ELEVATED LFTS: ICD-10-CM

## 2022-10-20 PROCEDURE — 76700 US ABDOMEN COMPLETE: ICD-10-PCS | Mod: 26,,, | Performed by: RADIOLOGY

## 2022-10-20 PROCEDURE — 76700 US EXAM ABDOM COMPLETE: CPT | Mod: 26,,, | Performed by: RADIOLOGY

## 2022-10-20 PROCEDURE — 76700 US EXAM ABDOM COMPLETE: CPT | Mod: TC

## 2022-11-03 ENCOUNTER — INFUSION (OUTPATIENT)
Dept: INFUSION THERAPY | Facility: HOSPITAL | Age: 37
End: 2022-11-03
Attending: CLINICAL NURSE SPECIALIST
Payer: MEDICAID

## 2022-11-03 VITALS
SYSTOLIC BLOOD PRESSURE: 150 MMHG | TEMPERATURE: 98 F | DIASTOLIC BLOOD PRESSURE: 83 MMHG | OXYGEN SATURATION: 96 % | RESPIRATION RATE: 18 BRPM | HEART RATE: 93 BPM

## 2022-11-03 DIAGNOSIS — D84.9 IMMUNOSUPPRESSION: ICD-10-CM

## 2022-11-03 DIAGNOSIS — G35 MULTIPLE SCLEROSIS: Primary | ICD-10-CM

## 2022-11-03 DIAGNOSIS — Z79.899 IMMUNOSUPPRESSION DUE TO DRUG THERAPY: ICD-10-CM

## 2022-11-03 DIAGNOSIS — D84.821 IMMUNOSUPPRESSION DUE TO DRUG THERAPY: ICD-10-CM

## 2022-11-03 PROCEDURE — M0220 HC INJECTION ADMIN, TIXAGEVIMAB-CILGAVIMAB, INCL POST ADMIN MONIT: HCPCS | Performed by: INTERNAL MEDICINE

## 2022-11-03 PROCEDURE — 63600175 PHARM REV CODE 636 W HCPCS: Performed by: INTERNAL MEDICINE

## 2022-11-03 RX ORDER — PREDNISONE 20 MG/1
40 TABLET ORAL ONCE AS NEEDED
Status: DISCONTINUED | OUTPATIENT
Start: 2022-11-03 | End: 2022-11-03 | Stop reason: HOSPADM

## 2022-11-03 RX ORDER — ALBUTEROL SULFATE 90 UG/1
2 AEROSOL, METERED RESPIRATORY (INHALATION) ONCE AS NEEDED
Status: DISCONTINUED | OUTPATIENT
Start: 2022-11-03 | End: 2022-11-03 | Stop reason: HOSPADM

## 2022-11-03 RX ORDER — PREDNISONE 20 MG/1
40 TABLET ORAL ONCE AS NEEDED
OUTPATIENT
Start: 2022-11-03

## 2022-11-03 RX ORDER — DIPHENHYDRAMINE HCL 25 MG
25 CAPSULE ORAL ONCE AS NEEDED
OUTPATIENT
Start: 2022-11-03

## 2022-11-03 RX ORDER — ACETAMINOPHEN 325 MG/1
650 TABLET ORAL ONCE AS NEEDED
OUTPATIENT
Start: 2022-11-03

## 2022-11-03 RX ORDER — DIPHENHYDRAMINE HCL 25 MG
25 CAPSULE ORAL ONCE AS NEEDED
Status: DISCONTINUED | OUTPATIENT
Start: 2022-11-03 | End: 2022-11-03 | Stop reason: HOSPADM

## 2022-11-03 RX ORDER — ONDANSETRON 4 MG/1
4 TABLET, ORALLY DISINTEGRATING ORAL ONCE AS NEEDED
Status: DISCONTINUED | OUTPATIENT
Start: 2022-11-03 | End: 2022-11-03 | Stop reason: HOSPADM

## 2022-11-03 RX ORDER — ONDANSETRON 4 MG/1
4 TABLET, ORALLY DISINTEGRATING ORAL ONCE AS NEEDED
OUTPATIENT
Start: 2022-11-03

## 2022-11-03 RX ORDER — ALBUTEROL SULFATE 90 UG/1
2 AEROSOL, METERED RESPIRATORY (INHALATION) ONCE AS NEEDED
OUTPATIENT
Start: 2022-11-03

## 2022-11-03 RX ORDER — ACETAMINOPHEN 325 MG/1
650 TABLET ORAL ONCE AS NEEDED
Status: DISCONTINUED | OUTPATIENT
Start: 2022-11-03 | End: 2022-11-03 | Stop reason: HOSPADM

## 2022-11-03 RX ORDER — EPINEPHRINE 0.3 MG/.3ML
0.3 INJECTION SUBCUTANEOUS ONCE AS NEEDED
Status: DISCONTINUED | OUTPATIENT
Start: 2022-11-03 | End: 2022-11-03 | Stop reason: HOSPADM

## 2022-11-03 RX ORDER — EPINEPHRINE 0.3 MG/.3ML
0.3 INJECTION SUBCUTANEOUS ONCE AS NEEDED
OUTPATIENT
Start: 2022-11-03

## 2022-11-03 RX ADMIN — Medication 300 MG: at 11:11

## 2022-12-01 ENCOUNTER — PATIENT MESSAGE (OUTPATIENT)
Dept: PSYCHIATRY | Facility: CLINIC | Age: 37
End: 2022-12-01
Payer: MEDICAID

## 2022-12-16 ENCOUNTER — HOSPITAL ENCOUNTER (OUTPATIENT)
Dept: RADIOLOGY | Facility: HOSPITAL | Age: 37
Discharge: HOME | End: 2022-12-16
Attending: CLINICAL NURSE SPECIALIST
Payer: MEDICAID

## 2022-12-16 DIAGNOSIS — G35 MULTIPLE SCLEROSIS: ICD-10-CM

## 2022-12-16 PROCEDURE — 70551 MRI BRAIN STEM W/O DYE: CPT | Mod: TC

## 2022-12-16 PROCEDURE — 70551 MRI BRAIN DEMYELINATING WITHOUT CONTRAST: ICD-10-PCS | Mod: 26,,, | Performed by: RADIOLOGY

## 2022-12-16 PROCEDURE — 70551 MRI BRAIN STEM W/O DYE: CPT | Mod: 26,,, | Performed by: RADIOLOGY

## 2022-12-20 ENCOUNTER — TELEPHONE (OUTPATIENT)
Dept: NEUROLOGY | Facility: CLINIC | Age: 37
End: 2022-12-20

## 2022-12-20 DIAGNOSIS — G35 MULTIPLE SCLEROSIS: Primary | ICD-10-CM

## 2022-12-20 NOTE — TELEPHONE ENCOUNTER
----- Message from Mi Crystal sent at 12/20/2022 11:43 AM CST -----  Contact: Pt  Pt is requesting a callback in regards to scheduling her injection appt. Pt stated she completed her MRI on 12/16 and is not sure if she need to see the nurse first or just schedule that injection appt. Please adv pt.       Confirmed contact below:   Contact Name:Cari Barillas  Phone Number: [phone]

## 2022-12-23 ENCOUNTER — LAB VISIT (OUTPATIENT)
Dept: LAB | Facility: OTHER | Age: 37
End: 2022-12-23
Attending: CLINICAL NURSE SPECIALIST
Payer: MEDICAID

## 2022-12-23 DIAGNOSIS — G35 MULTIPLE SCLEROSIS: ICD-10-CM

## 2022-12-23 LAB
BASOPHILS # BLD AUTO: 0.04 K/UL (ref 0–0.2)
BASOPHILS NFR BLD: 0.3 % (ref 0–1.9)
DIFFERENTIAL METHOD: ABNORMAL
EOSINOPHIL # BLD AUTO: 0.1 K/UL (ref 0–0.5)
EOSINOPHIL NFR BLD: 0.6 % (ref 0–8)
ERYTHROCYTE [DISTWIDTH] IN BLOOD BY AUTOMATED COUNT: 13 % (ref 11.5–14.5)
HBV CORE AB SERPL QL IA: NORMAL
HBV SURFACE AB SER-ACNC: <3 MIU/ML
HBV SURFACE AB SER-ACNC: NORMAL M[IU]/ML
HBV SURFACE AG SERPL QL IA: NORMAL
HCT VFR BLD AUTO: 42.5 % (ref 37–48.5)
HGB BLD-MCNC: 14.4 G/DL (ref 12–16)
IGA SERPL-MCNC: 133 MG/DL (ref 40–350)
IGG SERPL-MCNC: 637 MG/DL (ref 650–1600)
IGM SERPL-MCNC: 67 MG/DL (ref 50–300)
IMM GRANULOCYTES # BLD AUTO: 0.05 K/UL (ref 0–0.04)
IMM GRANULOCYTES NFR BLD AUTO: 0.4 % (ref 0–0.5)
LYMPHOCYTES # BLD AUTO: 2.9 K/UL (ref 1–4.8)
LYMPHOCYTES NFR BLD: 20.9 % (ref 18–48)
MCH RBC QN AUTO: 31.8 PG (ref 27–31)
MCHC RBC AUTO-ENTMCNC: 33.9 G/DL (ref 32–36)
MCV RBC AUTO: 94 FL (ref 82–98)
MONOCYTES # BLD AUTO: 0.9 K/UL (ref 0.3–1)
MONOCYTES NFR BLD: 6.8 % (ref 4–15)
NEUTROPHILS # BLD AUTO: 9.9 K/UL (ref 1.8–7.7)
NEUTROPHILS NFR BLD: 71 % (ref 38–73)
NRBC BLD-RTO: 0 /100 WBC
PLATELET # BLD AUTO: 315 K/UL (ref 150–450)
PMV BLD AUTO: 11.7 FL (ref 9.2–12.9)
RBC # BLD AUTO: 4.53 M/UL (ref 4–5.4)
WBC # BLD AUTO: 13.91 K/UL (ref 3.9–12.7)

## 2022-12-23 PROCEDURE — 87340 HEPATITIS B SURFACE AG IA: CPT | Performed by: CLINICAL NURSE SPECIALIST

## 2022-12-23 PROCEDURE — 36415 COLL VENOUS BLD VENIPUNCTURE: CPT | Performed by: CLINICAL NURSE SPECIALIST

## 2022-12-23 PROCEDURE — 86706 HEP B SURFACE ANTIBODY: CPT | Mod: 91 | Performed by: CLINICAL NURSE SPECIALIST

## 2022-12-23 PROCEDURE — 86704 HEP B CORE ANTIBODY TOTAL: CPT | Performed by: CLINICAL NURSE SPECIALIST

## 2022-12-23 PROCEDURE — 82784 ASSAY IGA/IGD/IGG/IGM EACH: CPT | Performed by: CLINICAL NURSE SPECIALIST

## 2022-12-23 PROCEDURE — 85025 COMPLETE CBC W/AUTO DIFF WBC: CPT | Performed by: CLINICAL NURSE SPECIALIST

## 2022-12-27 ENCOUNTER — PATIENT MESSAGE (OUTPATIENT)
Dept: NEUROLOGY | Facility: CLINIC | Age: 37
End: 2022-12-27
Payer: MEDICAID

## 2022-12-27 DIAGNOSIS — G35 MULTIPLE SCLEROSIS: Primary | ICD-10-CM

## 2023-01-03 ENCOUNTER — LAB VISIT (OUTPATIENT)
Dept: LAB | Facility: OTHER | Age: 38
End: 2023-01-03
Attending: CLINICAL NURSE SPECIALIST
Payer: MEDICAID

## 2023-01-03 DIAGNOSIS — G35 MULTIPLE SCLEROSIS: ICD-10-CM

## 2023-01-03 LAB
BASOPHILS # BLD AUTO: 0.04 K/UL (ref 0–0.2)
BASOPHILS NFR BLD: 0.4 % (ref 0–1.9)
DIFFERENTIAL METHOD: ABNORMAL
EOSINOPHIL # BLD AUTO: 0.1 K/UL (ref 0–0.5)
EOSINOPHIL NFR BLD: 1.4 % (ref 0–8)
ERYTHROCYTE [DISTWIDTH] IN BLOOD BY AUTOMATED COUNT: 13.1 % (ref 11.5–14.5)
HCT VFR BLD AUTO: 42.4 % (ref 37–48.5)
HGB BLD-MCNC: 14.3 G/DL (ref 12–16)
IMM GRANULOCYTES # BLD AUTO: 0.03 K/UL (ref 0–0.04)
IMM GRANULOCYTES NFR BLD AUTO: 0.3 % (ref 0–0.5)
LYMPHOCYTES # BLD AUTO: 3 K/UL (ref 1–4.8)
LYMPHOCYTES NFR BLD: 29.7 % (ref 18–48)
MCH RBC QN AUTO: 32.1 PG (ref 27–31)
MCHC RBC AUTO-ENTMCNC: 33.7 G/DL (ref 32–36)
MCV RBC AUTO: 95 FL (ref 82–98)
MONOCYTES # BLD AUTO: 0.6 K/UL (ref 0.3–1)
MONOCYTES NFR BLD: 5.9 % (ref 4–15)
NEUTROPHILS # BLD AUTO: 6.2 K/UL (ref 1.8–7.7)
NEUTROPHILS NFR BLD: 62.3 % (ref 38–73)
NRBC BLD-RTO: 0 /100 WBC
PLATELET # BLD AUTO: 281 K/UL (ref 150–450)
PMV BLD AUTO: 11.6 FL (ref 9.2–12.9)
RBC # BLD AUTO: 4.45 M/UL (ref 4–5.4)
WBC # BLD AUTO: 9.95 K/UL (ref 3.9–12.7)

## 2023-01-03 PROCEDURE — 36415 COLL VENOUS BLD VENIPUNCTURE: CPT | Performed by: CLINICAL NURSE SPECIALIST

## 2023-01-03 PROCEDURE — 85025 COMPLETE CBC W/AUTO DIFF WBC: CPT | Performed by: CLINICAL NURSE SPECIALIST

## 2023-01-23 ENCOUNTER — INFUSION (OUTPATIENT)
Dept: INFUSION THERAPY | Facility: HOSPITAL | Age: 38
End: 2023-01-23
Payer: MEDICAID

## 2023-01-23 VITALS
DIASTOLIC BLOOD PRESSURE: 73 MMHG | RESPIRATION RATE: 18 BRPM | TEMPERATURE: 98 F | SYSTOLIC BLOOD PRESSURE: 127 MMHG | BODY MASS INDEX: 27.31 KG/M2 | OXYGEN SATURATION: 99 % | HEIGHT: 64 IN | WEIGHT: 160 LBS | HEART RATE: 87 BPM

## 2023-01-23 DIAGNOSIS — G35 MULTIPLE SCLEROSIS: Primary | ICD-10-CM

## 2023-01-23 PROCEDURE — 96375 TX/PRO/DX INJ NEW DRUG ADDON: CPT

## 2023-01-23 PROCEDURE — 63600175 PHARM REV CODE 636 W HCPCS: Performed by: PSYCHIATRY & NEUROLOGY

## 2023-01-23 PROCEDURE — 25000003 PHARM REV CODE 250: Performed by: PSYCHIATRY & NEUROLOGY

## 2023-01-23 PROCEDURE — 96367 TX/PROPH/DG ADDL SEQ IV INF: CPT

## 2023-01-23 PROCEDURE — 96415 CHEMO IV INFUSION ADDL HR: CPT

## 2023-01-23 PROCEDURE — 96413 CHEMO IV INFUSION 1 HR: CPT

## 2023-01-23 RX ORDER — ACETAMINOPHEN 500 MG
1000 TABLET ORAL
Status: CANCELLED | OUTPATIENT
Start: 2023-06-19

## 2023-01-23 RX ORDER — DIPHENHYDRAMINE HYDROCHLORIDE 50 MG/ML
50 INJECTION INTRAMUSCULAR; INTRAVENOUS
Status: CANCELLED | OUTPATIENT
Start: 2023-06-19

## 2023-01-23 RX ORDER — SODIUM CHLORIDE 0.9 % (FLUSH) 0.9 %
10 SYRINGE (ML) INJECTION
Status: DISCONTINUED | OUTPATIENT
Start: 2023-01-23 | End: 2023-01-23 | Stop reason: HOSPADM

## 2023-01-23 RX ORDER — HEPARIN 100 UNIT/ML
500 SYRINGE INTRAVENOUS
Status: DISCONTINUED | OUTPATIENT
Start: 2023-01-23 | End: 2023-01-23 | Stop reason: HOSPADM

## 2023-01-23 RX ORDER — FAMOTIDINE 10 MG/ML
20 INJECTION INTRAVENOUS
Status: CANCELLED | OUTPATIENT
Start: 2023-06-19

## 2023-01-23 RX ORDER — DIPHENHYDRAMINE HYDROCHLORIDE 50 MG/ML
50 INJECTION INTRAMUSCULAR; INTRAVENOUS
Status: DISCONTINUED | OUTPATIENT
Start: 2023-01-23 | End: 2023-01-23 | Stop reason: HOSPADM

## 2023-01-23 RX ORDER — EPINEPHRINE 0.3 MG/.3ML
0.3 INJECTION SUBCUTANEOUS
Status: CANCELLED | OUTPATIENT
Start: 2023-06-19

## 2023-01-23 RX ORDER — SODIUM CHLORIDE 0.9 % (FLUSH) 0.9 %
10 SYRINGE (ML) INJECTION
Status: CANCELLED | OUTPATIENT
Start: 2023-06-19

## 2023-01-23 RX ORDER — FAMOTIDINE 10 MG/ML
20 INJECTION INTRAVENOUS
Status: COMPLETED | OUTPATIENT
Start: 2023-01-23 | End: 2023-01-23

## 2023-01-23 RX ORDER — HEPARIN 100 UNIT/ML
500 SYRINGE INTRAVENOUS
Status: CANCELLED | OUTPATIENT
Start: 2023-06-19

## 2023-01-23 RX ORDER — EPINEPHRINE 0.3 MG/.3ML
0.3 INJECTION SUBCUTANEOUS
Status: DISCONTINUED | OUTPATIENT
Start: 2023-01-23 | End: 2023-01-23 | Stop reason: HOSPADM

## 2023-01-23 RX ORDER — ACETAMINOPHEN 500 MG
1000 TABLET ORAL
Status: COMPLETED | OUTPATIENT
Start: 2023-01-23 | End: 2023-01-23

## 2023-01-23 RX ADMIN — DIPHENHYDRAMINE HYDROCHLORIDE 50 MG: 50 INJECTION, SOLUTION INTRAMUSCULAR; INTRAVENOUS at 09:01

## 2023-01-23 RX ADMIN — ACETAMINOPHEN 1000 MG: 500 TABLET ORAL at 08:01

## 2023-01-23 RX ADMIN — DEXTROSE: 50 INJECTION, SOLUTION INTRAVENOUS at 09:01

## 2023-01-23 RX ADMIN — OCRELIZUMAB 600 MG: 300 INJECTION INTRAVENOUS at 09:01

## 2023-01-23 RX ADMIN — SODIUM CHLORIDE: 0.9 INJECTION, SOLUTION INTRAVENOUS at 08:01

## 2023-01-23 RX ADMIN — FAMOTIDINE 20 MG: 10 INJECTION INTRAVENOUS at 09:01

## 2023-02-06 ENCOUNTER — OFFICE VISIT (OUTPATIENT)
Dept: OBSTETRICS AND GYNECOLOGY | Facility: CLINIC | Age: 38
End: 2023-02-06
Payer: MEDICAID

## 2023-02-06 VITALS
DIASTOLIC BLOOD PRESSURE: 74 MMHG | SYSTOLIC BLOOD PRESSURE: 125 MMHG | HEIGHT: 64 IN | WEIGHT: 167.75 LBS | BODY MASS INDEX: 28.64 KG/M2

## 2023-02-06 DIAGNOSIS — Z01.419 ENCOUNTER FOR ANNUAL ROUTINE GYNECOLOGICAL EXAMINATION: Primary | ICD-10-CM

## 2023-02-06 DIAGNOSIS — A63.0 GENITAL WARTS: ICD-10-CM

## 2023-02-06 DIAGNOSIS — R30.0 DYSURIA: ICD-10-CM

## 2023-02-06 DIAGNOSIS — F32.A DEPRESSION, UNSPECIFIED DEPRESSION TYPE: ICD-10-CM

## 2023-02-06 DIAGNOSIS — Z11.3 SCREEN FOR STD (SEXUALLY TRANSMITTED DISEASE): ICD-10-CM

## 2023-02-06 LAB
BILIRUB SERPL-MCNC: ABNORMAL MG/DL
BLOOD URINE, POC: ABNORMAL
CLARITY, POC UA: ABNORMAL
COLOR, POC UA: YELLOW
GLUCOSE UR QL STRIP: ABNORMAL
KETONES UR QL STRIP: ABNORMAL
LEUKOCYTE ESTERASE URINE, POC: ABNORMAL
NITRITE, POC UA: ABNORMAL
PH, POC UA: 7
PROTEIN, POC: ABNORMAL
SPECIFIC GRAVITY, POC UA: 1.01
UROBILINOGEN, POC UA: ABNORMAL

## 2023-02-06 PROCEDURE — 3008F PR BODY MASS INDEX (BMI) DOCUMENTED: ICD-10-PCS | Mod: CPTII,,, | Performed by: OBSTETRICS & GYNECOLOGY

## 2023-02-06 PROCEDURE — 99212 OFFICE O/P EST SF 10 MIN: CPT | Mod: PBBFAC | Performed by: OBSTETRICS & GYNECOLOGY

## 2023-02-06 PROCEDURE — 1159F MED LIST DOCD IN RCRD: CPT | Mod: CPTII,,, | Performed by: OBSTETRICS & GYNECOLOGY

## 2023-02-06 PROCEDURE — 99999 PR PBB SHADOW E&M-EST. PATIENT-LVL II: ICD-10-PCS | Mod: PBBFAC,,, | Performed by: OBSTETRICS & GYNECOLOGY

## 2023-02-06 PROCEDURE — 81002 URINALYSIS NONAUTO W/O SCOPE: CPT | Mod: PBBFAC | Performed by: OBSTETRICS & GYNECOLOGY

## 2023-02-06 PROCEDURE — 1160F RVW MEDS BY RX/DR IN RCRD: CPT | Mod: CPTII,,, | Performed by: OBSTETRICS & GYNECOLOGY

## 2023-02-06 PROCEDURE — 1160F PR REVIEW ALL MEDS BY PRESCRIBER/CLIN PHARMACIST DOCUMENTED: ICD-10-PCS | Mod: CPTII,,, | Performed by: OBSTETRICS & GYNECOLOGY

## 2023-02-06 PROCEDURE — 87591 N.GONORRHOEAE DNA AMP PROB: CPT | Performed by: OBSTETRICS & GYNECOLOGY

## 2023-02-06 PROCEDURE — 3074F SYST BP LT 130 MM HG: CPT | Mod: CPTII,,, | Performed by: OBSTETRICS & GYNECOLOGY

## 2023-02-06 PROCEDURE — 81514 NFCT DS BV&VAGINITIS DNA ALG: CPT | Performed by: OBSTETRICS & GYNECOLOGY

## 2023-02-06 PROCEDURE — 99395 PREV VISIT EST AGE 18-39: CPT | Mod: S$PBB,,, | Performed by: OBSTETRICS & GYNECOLOGY

## 2023-02-06 PROCEDURE — 3074F PR MOST RECENT SYSTOLIC BLOOD PRESSURE < 130 MM HG: ICD-10-PCS | Mod: CPTII,,, | Performed by: OBSTETRICS & GYNECOLOGY

## 2023-02-06 PROCEDURE — 1159F PR MEDICATION LIST DOCUMENTED IN MEDICAL RECORD: ICD-10-PCS | Mod: CPTII,,, | Performed by: OBSTETRICS & GYNECOLOGY

## 2023-02-06 PROCEDURE — 3078F PR MOST RECENT DIASTOLIC BLOOD PRESSURE < 80 MM HG: ICD-10-PCS | Mod: CPTII,,, | Performed by: OBSTETRICS & GYNECOLOGY

## 2023-02-06 PROCEDURE — 87624 HPV HI-RISK TYP POOLED RSLT: CPT | Performed by: OBSTETRICS & GYNECOLOGY

## 2023-02-06 PROCEDURE — 3078F DIAST BP <80 MM HG: CPT | Mod: CPTII,,, | Performed by: OBSTETRICS & GYNECOLOGY

## 2023-02-06 PROCEDURE — 99999 PR PBB SHADOW E&M-EST. PATIENT-LVL II: CPT | Mod: PBBFAC,,, | Performed by: OBSTETRICS & GYNECOLOGY

## 2023-02-06 PROCEDURE — 99395 PR PREVENTIVE VISIT,EST,18-39: ICD-10-PCS | Mod: S$PBB,,, | Performed by: OBSTETRICS & GYNECOLOGY

## 2023-02-06 PROCEDURE — 88175 CYTOPATH C/V AUTO FLUID REDO: CPT | Performed by: OBSTETRICS & GYNECOLOGY

## 2023-02-06 PROCEDURE — 3008F BODY MASS INDEX DOCD: CPT | Mod: CPTII,,, | Performed by: OBSTETRICS & GYNECOLOGY

## 2023-02-07 LAB
C TRACH DNA SPEC QL NAA+PROBE: NOT DETECTED
N GONORRHOEA DNA SPEC QL NAA+PROBE: NOT DETECTED

## 2023-02-08 NOTE — PROGRESS NOTES
SUBJECTIVE:     Chief Complaint: No chief complaint on file.       History of Present Illness:  Annual Exam  Patient presents for annual exam.   She c/o some dysuria today. Thinks she may have have another small wart. Others resolved.  On Depression screen today, she stated that she does sometimes think to herself that she would be better off dead. On further questioning, she realizes that this is not true and does not plan to harm herself. She feels like she has been doing well on her psych meds and follows up with a therapist. She is happy with her new job.   LMP: 10/8/22  She denies any vd, vb, dyspareunia, anxiety.  Last pap was not run in .  Birth Control: condoms  wants STD testing.     GYN screening history: h/o LEEP , denies stds  Mammogram history: na  Colonoscopy history: na  Dexa history: na     FH:   Breast cancer: none  Colon cancer: maternal GM  Ovarian cancer: none       Review of patient's allergies indicates:   Allergen Reactions    Gluten protein     Soy        Past Medical History:   Diagnosis Date    Abnormal Pap smear of cervix     Anxiety     Depression     H/O LEEP     Multiple sclerosis      Past Surgical History:   Procedure Laterality Date    CERVICAL BIOPSY  W/ LOOP ELECTRODE EXCISION      COLONOSCOPY N/A 10/25/2022    Procedure: COLONOSCOPY;  Surgeon: Janie Fleming MD;  Location: [facility];  Service: Endoscopy;  Laterality: N/A;    ESOPHAGOGASTRODUODENOSCOPY N/A 10/25/2022    Procedure: EGD (ESOPHAGOGASTRODUODENOSCOPY);  Surgeon: Janie Fleming MD;  Location: [facility];  Service: Endoscopy;  Laterality: N/A;    TONSILLECTOMY       OB History          2    Para        Term                AB   2    Living             SAB        IAB        Ectopic        Multiple        Live Births                   Family History   Problem Relation Age of Onset    Colon cancer Maternal Grandmother 78    Diabetes Maternal Grandmother     Diabetes Mother      Social  History     Tobacco Use    Smoking status: Every Day     Packs/day: 0.50     Types: Cigarettes    Smokeless tobacco: Never   Substance Use Topics    Alcohol use: Yes    Drug use: Not Currently     Types: Marijuana, LSD, Cocaine, MDMA (Ecstacy)       Current Outpatient Medications   Medication Sig    cholecalciferol, vitamin D3, 1,250 mcg (50,000 unit) capsule Take 1 capsule (50,000 Units total) by mouth once a week.    dicyclomine (BENTYL) 20 mg tablet Take 1 tablet (20 mg total) by mouth 3 (three) times daily as needed (abd pain or diarrhea).    fluticasone propionate (FLONASE) 50 mcg/actuation nasal spray 1 spray by Each Nostril route once daily.    gabapentin (NEURONTIN) 300 MG capsule Take 300 mg by mouth 3 (three) times daily.    LORazepam (ATIVAN) 0.5 MG tablet Take 1 tablet (0.5 mg total) by mouth every 8 (eight) hours as needed for Anxiety.    magnesium 30 mg Tab Take 250 mg by mouth once.    methocarbamoL (ROBAXIN) 500 MG Tab Take 1 tablet (500 mg total) by mouth 4 (four) times daily as needed (muscle pain (TMJ)).    ocrelizumab (OCREVUS IV) Inject into the vein.    WELLBUTRIN  mg 24 hr tablet Take 300 mg by mouth every morning.    pantoprazole (PROTONIX) 40 MG tablet Take 1 tablet (40 mg total) by mouth once daily.     No current facility-administered medications for this visit.       Review of Systems:  GENERAL: No fever, chills, fatigability or weight loss.  CARDIOVASCULAR: No chest pain. No palpitations.  RESPIRATORY: No SOB, no wheezing.  BREAST: Denies pain. No lumps. No discharge.  VULVAR: No pain, no lesions and no itching.  VAGINAL: No relaxation, no itching, no discharge, no abnormal bleeding and + lesions.  ABDOMEN: No abdominal pain. Denies nausea. Denies vomiting. No diarrhea. No constipation  URINARY: No incontinence, no nocturia, no frequency and + dysuria.  NEUROLOGICAL: No headaches. No vision changes.       OBJECTIVE:     Vitals:    02/06/23 1537   BP: 125/74       Patient verbally  consents to pelvic and breast exams.  Physical Exam:  Gen: NAD, well developed, well-nourished  HEENT: Normocephalic, atraumatic  Eyes: EOM nl, conjuntivae normal  Neck: ROM normal, no thyromegaly  Respiratory: Effort normal   Abd: soft, nontender, no masses palpated  Breast: Normal bilaterally, no masses, lesions or tenderness. No nipple discharge on expression, no lymphadenopathy bilaterally.  SSE:  Vulva: small 0.25cm wart left lower labia majora.  Cervix: No lesions noted, nonfriable, no vaginal discharge or vaginal bleeding noted  BME:   Cervix: No CMT  Adnexa: nl bilaterally, no masses or fullness palpated  Uterus: normal, nonenlarged  Musculoskeletal: normal ROM  Neuro: alert, AAOx3  Skin: warm and dry  Psych: mood/affect nl, behavior normal, judgement normal, thought content normal        ASSESSMENT:       ICD-10-CM ICD-9-CM    1. Encounter for annual routine gynecological examination  Z01.419 V72.31 Liquid-Based Pap Smear, Screening      HPV High Risk Genotypes, PCR      2. Screen for STD (sexually transmitted disease)  Z11.3 V74.5 Vaginosis Screen by DNA Probe      C. trachomatis/N. gonorrhoeae by AMP DNA      3. Dysuria  R30.0 788.1 POCT URINE DIPSTICK WITHOUT MICROSCOPE      4. Depression, unspecified depression type  F32.A 311       5. Genital warts  A63.0 078.11              Plan:      Diagnoses and all orders for this visit:    Encounter for annual routine gynecological examination  -     Liquid-Based Pap Smear, Screening  -     HPV High Risk Genotypes, PCR    Screen for STD (sexually transmitted disease)  -     Vaginosis Screen by DNA Probe  -     C. trachomatis/N. gonorrhoeae by AMP DNA    Dysuria  -     POCT URINE DIPSTICK WITHOUT MICROSCOPE    Depression, unspecified depression type    Genital warts        Orders Placed This Encounter   Procedures    HPV High Risk Genotypes, PCR    Vaginosis Screen by DNA Probe    C. trachomatis/N. gonorrhoeae by AMP DNA    POCT URINE DIPSTICK WITHOUT MICROSCOPE      Will observe wart for now. If she desires treatment, can return to clinic for TCA.  Given SI and HI strict precautions.   Follow up in one year for annual, or prn.    Julie R Jeansonne

## 2023-02-10 LAB
BACTERIAL VAGINOSIS DNA: POSITIVE
CANDIDA GLABRATA DNA: NEGATIVE
CANDIDA KRUSEI DNA: NEGATIVE
CANDIDA RRNA VAG QL PROBE: NEGATIVE
T VAGINALIS RRNA GENITAL QL PROBE: NEGATIVE

## 2023-02-11 LAB
FINAL PATHOLOGIC DIAGNOSIS: NORMAL
Lab: NORMAL

## 2023-02-13 RX ORDER — METRONIDAZOLE 500 MG/1
500 TABLET ORAL EVERY 12 HOURS
Qty: 14 TABLET | Refills: 0 | Status: SHIPPED | OUTPATIENT
Start: 2023-02-13 | End: 2023-02-20

## 2023-02-14 ENCOUNTER — PATIENT MESSAGE (OUTPATIENT)
Dept: OBSTETRICS AND GYNECOLOGY | Facility: CLINIC | Age: 38
End: 2023-02-14
Payer: MEDICAID

## 2023-02-20 ENCOUNTER — PATIENT MESSAGE (OUTPATIENT)
Dept: PSYCHIATRY | Facility: CLINIC | Age: 38
End: 2023-02-20
Payer: MEDICAID

## 2023-03-30 ENCOUNTER — TELEPHONE (OUTPATIENT)
Dept: NEUROLOGY | Facility: CLINIC | Age: 38
End: 2023-03-30
Payer: MEDICAID

## 2023-03-30 DIAGNOSIS — G35 MULTIPLE SCLEROSIS: Primary | ICD-10-CM

## 2023-03-30 NOTE — TELEPHONE ENCOUNTER
----- Message from Gloria Romeo sent at 3/30/2023 11:50 AM CDT -----  Regarding: medication  Contact: [phone]  Pt is calling to see if she can get some medication called in she is not feeling well her MS is acting up please call and adv@ [phone]

## 2023-03-30 NOTE — TELEPHONE ENCOUNTER
Spoke with pt.  She stated that she has had a headache, extreme fatigue, and leg pain and tingling since Monday.  She stated that she has not had any recent infections or fevers.  Ordered infectious labs to be done at patient's earliest convenience.  Will follow up once labs are resulted.

## 2023-05-01 NOTE — PLAN OF CARE
Problem: Adult Inpatient Plan of Care  Goal: Optimal Comfort and Wellbeing  Intervention: Provide Person-Centered Care  Flowsheets (Taken 10/19/2020 1008)  Trust Relationship/Rapport:   care explained   reassurance provided   choices provided   emotional support provided   thoughts/feelings acknowledged   empathic listening provided   questions answered   questions encouraged      Impression: Age-related nuclear cataract, left eye: H25.12. Plan: Will get her scheduled for the left eye.

## 2023-05-13 ENCOUNTER — PATIENT MESSAGE (OUTPATIENT)
Dept: NEUROLOGY | Facility: CLINIC | Age: 38
End: 2023-05-13
Payer: MEDICAID

## 2023-05-13 DIAGNOSIS — G35 MULTIPLE SCLEROSIS: Primary | ICD-10-CM

## 2023-05-18 ENCOUNTER — LAB VISIT (OUTPATIENT)
Dept: LAB | Facility: OTHER | Age: 38
End: 2023-05-18
Attending: CLINICAL NURSE SPECIALIST
Payer: MEDICAID

## 2023-05-18 DIAGNOSIS — G35 MULTIPLE SCLEROSIS: ICD-10-CM

## 2023-05-18 LAB
BILIRUB UR QL STRIP: NEGATIVE
CLARITY UR: CLEAR
COLOR UR: YELLOW
GLUCOSE UR QL STRIP: NEGATIVE
HGB UR QL STRIP: NEGATIVE
KETONES UR QL STRIP: NEGATIVE
LEUKOCYTE ESTERASE UR QL STRIP: NEGATIVE
NITRITE UR QL STRIP: NEGATIVE
PH UR STRIP: 7 [PH] (ref 5–8)
PROT UR QL STRIP: ABNORMAL
SP GR UR STRIP: 1.02 (ref 1–1.03)
URN SPEC COLLECT METH UR: ABNORMAL

## 2023-05-18 PROCEDURE — 81003 URINALYSIS AUTO W/O SCOPE: CPT | Performed by: CLINICAL NURSE SPECIALIST

## 2023-05-29 ENCOUNTER — TELEPHONE (OUTPATIENT)
Dept: NEUROLOGY | Facility: CLINIC | Age: 38
End: 2023-05-29

## 2023-05-29 ENCOUNTER — OFFICE VISIT (OUTPATIENT)
Dept: NEUROLOGY | Facility: CLINIC | Age: 38
End: 2023-05-29
Payer: MEDICAID

## 2023-05-29 DIAGNOSIS — Z71.89 COUNSELING REGARDING GOALS OF CARE: ICD-10-CM

## 2023-05-29 DIAGNOSIS — G35 MULTIPLE SCLEROSIS: Primary | ICD-10-CM

## 2023-05-29 DIAGNOSIS — Z79.899 HIGH RISK MEDICATION USE: ICD-10-CM

## 2023-05-29 DIAGNOSIS — Z29.89 PROPHYLACTIC IMMUNOTHERAPY: ICD-10-CM

## 2023-05-29 PROCEDURE — 99213 OFFICE O/P EST LOW 20 MIN: CPT | Mod: 95,,, | Performed by: CLINICAL NURSE SPECIALIST

## 2023-05-29 PROCEDURE — 99213 PR OFFICE/OUTPT VISIT, EST, LEVL III, 20-29 MIN: ICD-10-PCS | Mod: 95,,, | Performed by: CLINICAL NURSE SPECIALIST

## 2023-05-29 PROCEDURE — 1159F PR MEDICATION LIST DOCUMENTED IN MEDICAL RECORD: ICD-10-PCS | Mod: CPTII,95,, | Performed by: CLINICAL NURSE SPECIALIST

## 2023-05-29 PROCEDURE — 1159F MED LIST DOCD IN RCRD: CPT | Mod: CPTII,95,, | Performed by: CLINICAL NURSE SPECIALIST

## 2023-05-29 RX ORDER — ASPIRIN 325 MG
50000 TABLET, DELAYED RELEASE (ENTERIC COATED) ORAL WEEKLY
Qty: 12 CAPSULE | Refills: 1 | Status: SHIPPED | OUTPATIENT
Start: 2023-05-29 | End: 2023-11-20 | Stop reason: SDUPTHER

## 2023-05-29 NOTE — PROGRESS NOTES
Subjective:          Patient ID: Cari Barillas is a 37 y.o. female who presents today for a routine virtual visit for MS.  She was last seen in July 2022. The history has been provided by the patient.     The patient location is: her home   The chief complaint leading to consultation is: MS     Visit type: audiovisual    Face to Face time with patient: 17 minutes   25 minutes of total time spent on the encounter, which includes face to face time and non-face to face time preparing to see the patient (eg, review of tests), Obtaining and/or reviewing separately obtained history, Documenting clinical information in the electronic or other health record, Independently interpreting results (not separately reported) and communicating results to the patient/family/caregiver, or Care coordination (not separately reported).     Each patient to whom he or she provides medical services by telemedicine is:  (1) informed of the relationship between the physician and patient and the respective role of any other health care provider with respect to management of the patient; and (2) notified that he or she may decline to receive medical services by telemedicine and may withdraw from such care at any time.      MS HPI:  DMT: Ocrevus--last infused on 1/23/23; due next in July   Side effects from DMT? No   Taking vitamin D3 as recommended? Not consistently   She was experiencing increased symptoms earlier this month-headache, dizziness, weakness on left, side of body, tingling and pain. She feels like she was working too much. This was likely a pseudorelapse. She feels back to baseline now.   She denies any recent infections.   She has an active job and is on her feet a lot. She has not been exercising regularly.     Medications:  Current Outpatient Medications   Medication Sig    cholecalciferol, vitamin D3, 1,250 mcg (50,000 unit) capsule Take 1 capsule (50,000 Units total) by mouth once a week.    dicyclomine (BENTYL) 20 mg  "tablet Take 1 tablet (20 mg total) by mouth 3 (three) times daily as needed (abd pain or diarrhea).    fluticasone propionate (FLONASE) 50 mcg/actuation nasal spray 1 spray by Each Nostril route once daily.    gabapentin (NEURONTIN) 300 MG capsule Take 300 mg by mouth 3 (three) times daily.    LORazepam (ATIVAN) 0.5 MG tablet Take 1 tablet (0.5 mg total) by mouth every 8 (eight) hours as needed for Anxiety.    magnesium 30 mg Tab Take 250 mg by mouth once.    methocarbamoL (ROBAXIN) 500 MG Tab Take 1 tablet (500 mg total) by mouth 4 (four) times daily as needed (muscle pain (TMJ)).    methylphenidate HCl (CONCERTA) 18 MG CR tablet Take 1 tablet every day by oral route.    ocrelizumab (OCREVUS IV) Inject into the vein.    pantoprazole (PROTONIX) 40 MG tablet Take 1 tablet (40 mg total) by mouth once daily.    WELLBUTRIN  mg 24 hr tablet Take 300 mg by mouth every morning.       SOCIAL HISTORY  Social History     Tobacco Use    Smoking status: Every Day     Packs/day: 0.50     Types: Cigarettes    Smokeless tobacco: Never   Substance Use Topics    Alcohol use: Yes    Drug use: Not Currently     Types: Marijuana, LSD, Cocaine, MDMA (Ecstacy)       Living arrangements - the patient lives with a roommate     ROS:  REVIEW OF SYMPTOMS 5/29/23    Do you feel abnormally tired on most days? Yes--sleeps when she can; she has also been drinking less and going out less; She has started Concerta, but she feels like it is sort of "calming."    Do you feel you generally sleep well? No--she falls asleep easily, then wakes up and stays awake for a while, then she will fall back asleep. She takes magnesium at night. She feels like she is getting enough sleep, in general.    Do you have difficulty controlling your bladder?  No   Do you have difficulty controlling your bowels?  No   Do you have frequent muscle cramps, tightness or spasms in your limbs?  Yes--has spasms in legs and butt; manageable    Do you have new visual " symptoms?  No--wears glasses 80% of the time    Do you have worsening difficulty with your memory or thinking? No--manages her own medications and finances    Do you have worsening symptoms of anxiety or depression?  No--no more than usual    For patients who walk, Do you have more difficulty walking?  No   Have you fallen since your last visit?  No--   For patients who use wheelchairs: Do you have any skin wounds or breakdown? Not Applicable   Do you have difficulty using your hands?  No--she notices some numbness and tingling in her hands after a long shift; this gets better after rest    Do you have shooting or burning pain? Yes--she has nerve pain in her feet and left leg that comes and goes    Do you have difficulty with sexual function?  No   If you are sexually active, are you using birth control? Y/N  N/A No   Do you often choke when swallowing liquids or solid food?  No   Do you experience worsening symptoms when overheated? Yes--she becomes tired more quickly when overheated; She tries to avoid the heat if she can.   Do you need any new equipment such as a wheelchair, walker or shower chair? No   Do you receive co-pay financial assistance for your principal MS medicine? No--has not gotten a bill for Ocrevus    Would you be interested in participating in an MS research trial in the future? Yes   For patients on Gilenya, Tecfidera, Aubagio, Rituxan, Ocrevus, Tysabri, Lemtrada or Methotrexate, are you aware that you should NOT receive live virus vaccines?  Yes   Do you feel you have adequate family/friend support?  Yes   Do you have health insurance?   Yes   Are you currently employed? Yes   Do you receive SSDI/SSI?  Not Applicable   Do you use marijuana or cannabis products? No   Have you been diagnosed with a urinary tract infection since your last visit here? No   Have you been diagnosed with a respiratory tract infection since your last visit here? No   Have you been to the emergency room since your last  visit here? No   Have you been hospitalized since your last visit here?  No            Objective:        1. 25 foot timed walk:  Timed 25 Foot Walk: 1/18/2022 7/8/2022   Did patient wear an AFO? No No   Was assistive device used? No No   Time for 25 Foot Walk (seconds) 3.8 4.2   Time for 25 Foot Walk (seconds) 3.8 4       Neurologic Exam    Deferred   Imaging:     Results for orders placed during the hospital encounter of 12/16/22    MRI Brain Demyelinating Without Contrast    Impression  Stable white matter lesions intracranially.  No new lesion when compared to the prior study.    Electronically signed by resident: Levon Ortega  Date:    12/16/2022  Time:    15:50    Electronically signed by: Kimo Matta  Date:    12/16/2022  Time:    16:08      Labs:     Lab Results   Component Value Date    ELRSZMFR94AQ 26 (L) 01/18/2022    NRIQSBZP70ES 24 (L) 04/14/2021    RJXOSLVJ87TG 18 (L) 06/11/2020     Lab Results   Component Value Date    JCVINDEX 0.66 (H) 06/24/2020    JCVANTIBODY POSITIVE (A) 06/24/2020     Lab Results   Component Value Date    NY1PQGWH 82.3 06/24/2020    ABSOLUTECD3 2529.0 (H) 06/24/2020    IX6MANRV 15.6 06/24/2020    ABSOLUTECD8 481.0 06/24/2020    GN9NQHCN 66.1 (H) 06/24/2020    ABSOLUTECD4 2031.0 (H) 06/24/2020    LABCD48 4.22 (H) 06/24/2020     Lab Results   Component Value Date    WBC 11.92 05/18/2023    RBC 4.51 05/18/2023    HGB 14.2 05/18/2023    HCT 42.3 05/18/2023    MCV 94 05/18/2023    MCH 31.5 (H) 05/18/2023    MCHC 33.6 05/18/2023    RDW 12.3 05/18/2023     05/18/2023    MPV 11.3 05/18/2023    GRAN 7.7 05/18/2023    GRAN 64.6 05/18/2023    LYMPH 3.1 05/18/2023    LYMPH 26.3 05/18/2023    MONO 0.9 05/18/2023    MONO 7.9 05/18/2023    EOS 0.1 05/18/2023    BASO 0.04 05/18/2023    EOSINOPHIL 0.6 05/18/2023    BASOPHIL 0.3 05/18/2023     Sodium   Date Value Ref Range Status   05/18/2023 141 136 - 145 mmol/L Final     Potassium   Date Value Ref Range Status   05/18/2023 4.0 3.5  - 5.1 mmol/L Final     Chloride   Date Value Ref Range Status   05/18/2023 108 95 - 110 mmol/L Final     CO2   Date Value Ref Range Status   05/18/2023 25 23 - 29 mmol/L Final     Glucose   Date Value Ref Range Status   05/18/2023 110 70 - 110 mg/dL Final     BUN   Date Value Ref Range Status   05/18/2023 11 6 - 20 mg/dL Final     Creatinine   Date Value Ref Range Status   05/18/2023 0.9 0.5 - 1.4 mg/dL Final     Calcium   Date Value Ref Range Status   05/18/2023 9.5 8.7 - 10.5 mg/dL Final     Total Protein   Date Value Ref Range Status   05/18/2023 6.7 6.0 - 8.4 g/dL Final     Albumin   Date Value Ref Range Status   05/18/2023 3.9 3.5 - 5.2 g/dL Final     Total Bilirubin   Date Value Ref Range Status   05/18/2023 0.3 0.1 - 1.0 mg/dL Final     Comment:     For infants and newborns, interpretation of results should be based  on gestational age, weight and in agreement with clinical  observations.    Premature Infant recommended reference ranges:  Up to 24 hours.............<8.0 mg/dL  Up to 48 hours............<12.0 mg/dL  3-5 days..................<15.0 mg/dL  6-29 days.................<15.0 mg/dL       Alkaline Phosphatase   Date Value Ref Range Status   05/18/2023 65 55 - 135 U/L Final     AST   Date Value Ref Range Status   05/18/2023 17 10 - 40 U/L Final     ALT   Date Value Ref Range Status   05/18/2023 23 10 - 44 U/L Final     Anion Gap   Date Value Ref Range Status   05/18/2023 8 8 - 16 mmol/L Final     eGFR if    Date Value Ref Range Status   01/18/2022 >60.0 >60 mL/min/1.73 m^2 Final     eGFR if non    Date Value Ref Range Status   01/18/2022 >60.0 >60 mL/min/1.73 m^2 Final     Comment:     Calculation used to obtain the estimated glomerular filtration  rate (eGFR) is the CKD-EPI equation.        Lab Results   Component Value Date    HEPBSAG Non-reactive 12/23/2022    HEPBSAB <3.00 12/23/2022    HEPBSAB Non-reactive 12/23/2022    HEPBCAB Non-reactive 12/23/2022           MS  Impression and Plan:     NEURO MULTIPLE SCLEROSIS IMPRESSION:   MS Status:     Number of relapses in the past year?:  0    Number of relapses in the past year? comment:  Pseudorelapse earlier this month, but she has returned to baseline.     Clinical Progression:  Clinically Stable  Plan:     DMT:  No change in management    DMT comment:  Continue Ocrevus. Her next infusion is due in July. Safety labs are planned for June. New Rx for Vitamin D was sent to pharmacy. She is aware of the risks associated with immunosuppressant therapy, including increased risk of infection.       Symptom Management:  No change in symptom management       She will follow up with Dr. Becerra in 4 months. MRI can be ordered at that visit.       ROBYN Abreu, CNS    Problem List Items Addressed This Visit          Neurologic Problems    Multiple sclerosis - Primary    Overview     Patient on Ocrevus and stable         Relevant Medications    cholecalciferol, vitamin D3, 1,250 mcg (50,000 unit) capsule    Other Relevant Orders    CBC Auto Differential    Comprehensive Metabolic Panel    Hepatitis B Core Antibody, Total    Hepatitis B Surface Antigen    Immunoglobulins (IgG, IgA, IgM) Quantitative    Vitamin D     Other Visit Diagnoses       Counseling regarding goals of care        Prophylactic immunotherapy        High risk medication use

## 2023-05-29 NOTE — TELEPHONE ENCOUNTER
Spoke to pt and scheduled her labs for 6/28 at 12:45 PM at [facility]. Also, offered pt a f/u date with Dr. Becerra on 10/2 at 2:00 PM and pt agreed. Will get Dom to schedule.

## 2023-05-29 NOTE — TELEPHONE ENCOUNTER
----- Message from Roseanna Maddox, ROBYN, CNS sent at 5/29/2023  2:01 PM CDT -----  Labs in late June   F/U with DARIANA in late September/early October

## 2023-06-30 ENCOUNTER — LAB VISIT (OUTPATIENT)
Dept: LAB | Facility: OTHER | Age: 38
End: 2023-06-30
Attending: CLINICAL NURSE SPECIALIST
Payer: MEDICAID

## 2023-06-30 DIAGNOSIS — G35 MULTIPLE SCLEROSIS: ICD-10-CM

## 2023-06-30 LAB
25(OH)D3+25(OH)D2 SERPL-MCNC: 35 NG/ML (ref 30–96)
ALBUMIN SERPL BCP-MCNC: 4.2 G/DL (ref 3.5–5.2)
ALP SERPL-CCNC: 66 U/L (ref 55–135)
ALT SERPL W/O P-5'-P-CCNC: 20 U/L (ref 10–44)
ANION GAP SERPL CALC-SCNC: 12 MMOL/L (ref 8–16)
AST SERPL-CCNC: 18 U/L (ref 10–40)
BASOPHILS # BLD AUTO: 0.04 K/UL (ref 0–0.2)
BASOPHILS NFR BLD: 0.3 % (ref 0–1.9)
BILIRUB SERPL-MCNC: 0.4 MG/DL (ref 0.1–1)
BUN SERPL-MCNC: 9 MG/DL (ref 6–20)
CALCIUM SERPL-MCNC: 10 MG/DL (ref 8.7–10.5)
CHLORIDE SERPL-SCNC: 104 MMOL/L (ref 95–110)
CO2 SERPL-SCNC: 23 MMOL/L (ref 23–29)
CREAT SERPL-MCNC: 1 MG/DL (ref 0.5–1.4)
DIFFERENTIAL METHOD: ABNORMAL
EOSINOPHIL # BLD AUTO: 0.1 K/UL (ref 0–0.5)
EOSINOPHIL NFR BLD: 0.5 % (ref 0–8)
ERYTHROCYTE [DISTWIDTH] IN BLOOD BY AUTOMATED COUNT: 12.4 % (ref 11.5–14.5)
EST. GFR  (NO RACE VARIABLE): >60 ML/MIN/1.73 M^2
GLUCOSE SERPL-MCNC: 112 MG/DL (ref 70–110)
HBV CORE AB SERPL QL IA: NORMAL
HBV SURFACE AG SERPL QL IA: NORMAL
HCT VFR BLD AUTO: 43.9 % (ref 37–48.5)
HGB BLD-MCNC: 14.8 G/DL (ref 12–16)
IGA SERPL-MCNC: 124 MG/DL (ref 40–350)
IGG SERPL-MCNC: 635 MG/DL (ref 650–1600)
IGM SERPL-MCNC: 68 MG/DL (ref 50–300)
IMM GRANULOCYTES # BLD AUTO: 0.05 K/UL (ref 0–0.04)
IMM GRANULOCYTES NFR BLD AUTO: 0.4 % (ref 0–0.5)
LYMPHOCYTES # BLD AUTO: 3.2 K/UL (ref 1–4.8)
LYMPHOCYTES NFR BLD: 24.4 % (ref 18–48)
MCH RBC QN AUTO: 31.9 PG (ref 27–31)
MCHC RBC AUTO-ENTMCNC: 33.7 G/DL (ref 32–36)
MCV RBC AUTO: 95 FL (ref 82–98)
MONOCYTES # BLD AUTO: 0.7 K/UL (ref 0.3–1)
MONOCYTES NFR BLD: 5.6 % (ref 4–15)
NEUTROPHILS # BLD AUTO: 9.1 K/UL (ref 1.8–7.7)
NEUTROPHILS NFR BLD: 68.8 % (ref 38–73)
NRBC BLD-RTO: 0 /100 WBC
PLATELET # BLD AUTO: 309 K/UL (ref 150–450)
PMV BLD AUTO: 11.5 FL (ref 9.2–12.9)
POTASSIUM SERPL-SCNC: 3.5 MMOL/L (ref 3.5–5.1)
PROT SERPL-MCNC: 7.1 G/DL (ref 6–8.4)
RBC # BLD AUTO: 4.64 M/UL (ref 4–5.4)
SODIUM SERPL-SCNC: 139 MMOL/L (ref 136–145)
WBC # BLD AUTO: 13.15 K/UL (ref 3.9–12.7)

## 2023-06-30 PROCEDURE — 87340 HEPATITIS B SURFACE AG IA: CPT | Performed by: CLINICAL NURSE SPECIALIST

## 2023-06-30 PROCEDURE — 36415 COLL VENOUS BLD VENIPUNCTURE: CPT | Performed by: CLINICAL NURSE SPECIALIST

## 2023-06-30 PROCEDURE — 82306 VITAMIN D 25 HYDROXY: CPT | Performed by: CLINICAL NURSE SPECIALIST

## 2023-06-30 PROCEDURE — 86704 HEP B CORE ANTIBODY TOTAL: CPT | Performed by: CLINICAL NURSE SPECIALIST

## 2023-06-30 PROCEDURE — 80053 COMPREHEN METABOLIC PANEL: CPT | Performed by: CLINICAL NURSE SPECIALIST

## 2023-06-30 PROCEDURE — 82784 ASSAY IGA/IGD/IGG/IGM EACH: CPT | Performed by: CLINICAL NURSE SPECIALIST

## 2023-06-30 PROCEDURE — 85025 COMPLETE CBC W/AUTO DIFF WBC: CPT | Performed by: CLINICAL NURSE SPECIALIST

## 2023-07-03 ENCOUNTER — TELEPHONE (OUTPATIENT)
Dept: NEUROLOGY | Facility: CLINIC | Age: 38
End: 2023-07-03
Payer: MEDICAID

## 2023-07-03 NOTE — TELEPHONE ENCOUNTER
----- Message from Catherine Pruitt sent at 7/3/2023  1:40 PM CDT -----  Regarding: Appt  Contact: Pt [phone]  Pt is calling to schedule infusion please call

## 2023-07-10 ENCOUNTER — PATIENT MESSAGE (OUTPATIENT)
Dept: NEUROLOGY | Facility: CLINIC | Age: 38
End: 2023-07-10
Payer: MEDICAID

## 2023-07-10 DIAGNOSIS — R79.89 ABNORMAL CBC: Primary | ICD-10-CM

## 2023-07-10 RX ORDER — FAMOTIDINE 10 MG/ML
20 INJECTION INTRAVENOUS
Status: CANCELLED | OUTPATIENT
Start: 2023-07-10

## 2023-07-10 RX ORDER — EPINEPHRINE 0.3 MG/.3ML
0.3 INJECTION SUBCUTANEOUS
Status: CANCELLED | OUTPATIENT
Start: 2023-07-10

## 2023-07-10 RX ORDER — SODIUM CHLORIDE 0.9 % (FLUSH) 0.9 %
10 SYRINGE (ML) INJECTION
Status: CANCELLED | OUTPATIENT
Start: 2023-07-10

## 2023-07-10 RX ORDER — DIPHENHYDRAMINE HYDROCHLORIDE 50 MG/ML
50 INJECTION INTRAMUSCULAR; INTRAVENOUS
Status: CANCELLED | OUTPATIENT
Start: 2023-07-10

## 2023-07-10 RX ORDER — ACETAMINOPHEN 500 MG
1000 TABLET ORAL
Status: CANCELLED | OUTPATIENT
Start: 2023-07-10

## 2023-07-10 RX ORDER — HEPARIN 100 UNIT/ML
500 SYRINGE INTRAVENOUS
Status: CANCELLED | OUTPATIENT
Start: 2023-07-10

## 2023-07-13 ENCOUNTER — LAB VISIT (OUTPATIENT)
Dept: LAB | Facility: OTHER | Age: 38
End: 2023-07-13
Attending: CLINICAL NURSE SPECIALIST
Payer: MEDICAID

## 2023-07-13 DIAGNOSIS — R79.89 ABNORMAL CBC: ICD-10-CM

## 2023-07-13 LAB
BASOPHILS # BLD AUTO: 0.05 K/UL (ref 0–0.2)
BASOPHILS NFR BLD: 0.4 % (ref 0–1.9)
DIFFERENTIAL METHOD: ABNORMAL
EOSINOPHIL # BLD AUTO: 0.1 K/UL (ref 0–0.5)
EOSINOPHIL NFR BLD: 0.9 % (ref 0–8)
ERYTHROCYTE [DISTWIDTH] IN BLOOD BY AUTOMATED COUNT: 12.4 % (ref 11.5–14.5)
HCT VFR BLD AUTO: 42.7 % (ref 37–48.5)
HGB BLD-MCNC: 14.3 G/DL (ref 12–16)
IMM GRANULOCYTES # BLD AUTO: 0.06 K/UL (ref 0–0.04)
IMM GRANULOCYTES NFR BLD AUTO: 0.5 % (ref 0–0.5)
LYMPHOCYTES # BLD AUTO: 2.8 K/UL (ref 1–4.8)
LYMPHOCYTES NFR BLD: 24 % (ref 18–48)
MCH RBC QN AUTO: 31.6 PG (ref 27–31)
MCHC RBC AUTO-ENTMCNC: 33.5 G/DL (ref 32–36)
MCV RBC AUTO: 95 FL (ref 82–98)
MONOCYTES # BLD AUTO: 0.8 K/UL (ref 0.3–1)
MONOCYTES NFR BLD: 6.9 % (ref 4–15)
NEUTROPHILS # BLD AUTO: 8 K/UL (ref 1.8–7.7)
NEUTROPHILS NFR BLD: 67.3 % (ref 38–73)
NRBC BLD-RTO: 0 /100 WBC
PLATELET # BLD AUTO: 252 K/UL (ref 150–450)
PMV BLD AUTO: 12 FL (ref 9.2–12.9)
RBC # BLD AUTO: 4.52 M/UL (ref 4–5.4)
WBC # BLD AUTO: 11.82 K/UL (ref 3.9–12.7)

## 2023-07-13 PROCEDURE — 36415 COLL VENOUS BLD VENIPUNCTURE: CPT | Performed by: CLINICAL NURSE SPECIALIST

## 2023-07-13 PROCEDURE — 85025 COMPLETE CBC W/AUTO DIFF WBC: CPT | Performed by: CLINICAL NURSE SPECIALIST

## 2023-07-20 ENCOUNTER — INFUSION (OUTPATIENT)
Dept: INFUSION THERAPY | Facility: HOSPITAL | Age: 38
End: 2023-07-20
Payer: MEDICAID

## 2023-07-20 VITALS
TEMPERATURE: 98 F | HEIGHT: 64 IN | RESPIRATION RATE: 16 BRPM | DIASTOLIC BLOOD PRESSURE: 70 MMHG | WEIGHT: 169.56 LBS | BODY MASS INDEX: 28.95 KG/M2 | OXYGEN SATURATION: 96 % | HEART RATE: 76 BPM | SYSTOLIC BLOOD PRESSURE: 110 MMHG

## 2023-07-20 DIAGNOSIS — G35 MULTIPLE SCLEROSIS: Primary | ICD-10-CM

## 2023-07-20 PROCEDURE — 96366 THER/PROPH/DIAG IV INF ADDON: CPT

## 2023-07-20 PROCEDURE — 25000003 PHARM REV CODE 250: Performed by: CLINICAL NURSE SPECIALIST

## 2023-07-20 PROCEDURE — 96367 TX/PROPH/DG ADDL SEQ IV INF: CPT

## 2023-07-20 PROCEDURE — 63600175 PHARM REV CODE 636 W HCPCS: Performed by: CLINICAL NURSE SPECIALIST

## 2023-07-20 PROCEDURE — 96365 THER/PROPH/DIAG IV INF INIT: CPT

## 2023-07-20 PROCEDURE — 96375 TX/PRO/DX INJ NEW DRUG ADDON: CPT

## 2023-07-20 RX ORDER — DIPHENHYDRAMINE HYDROCHLORIDE 50 MG/ML
50 INJECTION INTRAMUSCULAR; INTRAVENOUS
Status: CANCELLED | OUTPATIENT
Start: 2023-12-28

## 2023-07-20 RX ORDER — ACETAMINOPHEN 500 MG
1000 TABLET ORAL
Status: COMPLETED | OUTPATIENT
Start: 2023-07-20 | End: 2023-07-20

## 2023-07-20 RX ORDER — FAMOTIDINE 10 MG/ML
20 INJECTION INTRAVENOUS
Status: COMPLETED | OUTPATIENT
Start: 2023-07-20 | End: 2023-07-20

## 2023-07-20 RX ORDER — DIPHENHYDRAMINE HYDROCHLORIDE 50 MG/ML
50 INJECTION INTRAMUSCULAR; INTRAVENOUS
Status: DISCONTINUED | OUTPATIENT
Start: 2023-07-20 | End: 2023-07-20 | Stop reason: HOSPADM

## 2023-07-20 RX ORDER — EPINEPHRINE 0.3 MG/.3ML
0.3 INJECTION SUBCUTANEOUS
Status: CANCELLED | OUTPATIENT
Start: 2023-12-28

## 2023-07-20 RX ORDER — SODIUM CHLORIDE 0.9 % (FLUSH) 0.9 %
10 SYRINGE (ML) INJECTION
Status: CANCELLED | OUTPATIENT
Start: 2023-12-28

## 2023-07-20 RX ORDER — SODIUM CHLORIDE 0.9 % (FLUSH) 0.9 %
10 SYRINGE (ML) INJECTION
Status: DISCONTINUED | OUTPATIENT
Start: 2023-07-20 | End: 2023-07-20 | Stop reason: HOSPADM

## 2023-07-20 RX ORDER — HEPARIN 100 UNIT/ML
500 SYRINGE INTRAVENOUS
Status: CANCELLED | OUTPATIENT
Start: 2023-12-28

## 2023-07-20 RX ORDER — EPINEPHRINE 0.3 MG/.3ML
0.3 INJECTION SUBCUTANEOUS
Status: DISCONTINUED | OUTPATIENT
Start: 2023-07-20 | End: 2023-07-20 | Stop reason: HOSPADM

## 2023-07-20 RX ORDER — FAMOTIDINE 10 MG/ML
20 INJECTION INTRAVENOUS
Status: CANCELLED | OUTPATIENT
Start: 2023-12-28

## 2023-07-20 RX ORDER — ACETAMINOPHEN 500 MG
1000 TABLET ORAL
Status: CANCELLED | OUTPATIENT
Start: 2023-12-28

## 2023-07-20 RX ORDER — HEPARIN 100 UNIT/ML
500 SYRINGE INTRAVENOUS
Status: DISCONTINUED | OUTPATIENT
Start: 2023-07-20 | End: 2023-07-20 | Stop reason: HOSPADM

## 2023-07-20 RX ADMIN — OCRELIZUMAB 600 MG: 300 INJECTION INTRAVENOUS at 11:07

## 2023-07-20 RX ADMIN — SODIUM CHLORIDE: 9 INJECTION, SOLUTION INTRAVENOUS at 10:07

## 2023-07-20 RX ADMIN — DEXTROSE: 50 INJECTION, SOLUTION INTRAVENOUS at 10:07

## 2023-07-20 RX ADMIN — DIPHENHYDRAMINE HYDROCHLORIDE 50 MG: 50 INJECTION INTRAMUSCULAR; INTRAVENOUS at 10:07

## 2023-07-20 RX ADMIN — ACETAMINOPHEN 1000 MG: 500 TABLET ORAL at 10:07

## 2023-07-20 RX ADMIN — FAMOTIDINE 20 MG: 10 INJECTION INTRAVENOUS at 10:07

## 2023-07-20 NOTE — PLAN OF CARE
Patient ambulatory to clinic. Ocrevus infused and tolerated well. No s/sx of adverse reaction noted. Patient ambulatory from clinic. NAD noted.

## 2023-07-20 NOTE — PLAN OF CARE
1500 Patient tolerated remainder of Ocrevus without incident. PIV removed, blood return noted and flushed without resistance. Vitals stable at completion of infusion. Patient aware of her next appointment date/time, uses [facility]. To contact provider with questions or concerns. D/C ambulatory and stable,

## 2023-07-21 ENCOUNTER — PATIENT MESSAGE (OUTPATIENT)
Dept: PSYCHIATRY | Facility: CLINIC | Age: 38
End: 2023-07-21
Payer: MEDICAID

## 2023-08-09 ENCOUNTER — OFFICE VISIT (OUTPATIENT)
Dept: OBSTETRICS AND GYNECOLOGY | Facility: CLINIC | Age: 38
End: 2023-08-09
Payer: MEDICAID

## 2023-08-09 VITALS — HEIGHT: 64 IN | WEIGHT: 171.94 LBS | BODY MASS INDEX: 29.35 KG/M2

## 2023-08-09 DIAGNOSIS — R39.15 URINARY URGENCY: Primary | ICD-10-CM

## 2023-08-09 PROCEDURE — 3008F BODY MASS INDEX DOCD: CPT | Mod: CPTII,,, | Performed by: OBSTETRICS & GYNECOLOGY

## 2023-08-09 PROCEDURE — 1159F PR MEDICATION LIST DOCUMENTED IN MEDICAL RECORD: ICD-10-PCS | Mod: CPTII,,, | Performed by: OBSTETRICS & GYNECOLOGY

## 2023-08-09 PROCEDURE — 87086 URINE CULTURE/COLONY COUNT: CPT

## 2023-08-09 PROCEDURE — 99213 OFFICE O/P EST LOW 20 MIN: CPT | Mod: S$PBB,,, | Performed by: OBSTETRICS & GYNECOLOGY

## 2023-08-09 PROCEDURE — 99213 OFFICE O/P EST LOW 20 MIN: CPT | Mod: PBBFAC

## 2023-08-09 PROCEDURE — 1160F PR REVIEW ALL MEDS BY PRESCRIBER/CLIN PHARMACIST DOCUMENTED: ICD-10-PCS | Mod: CPTII,,, | Performed by: OBSTETRICS & GYNECOLOGY

## 2023-08-09 PROCEDURE — 1159F MED LIST DOCD IN RCRD: CPT | Mod: CPTII,,, | Performed by: OBSTETRICS & GYNECOLOGY

## 2023-08-09 PROCEDURE — 3008F PR BODY MASS INDEX (BMI) DOCUMENTED: ICD-10-PCS | Mod: CPTII,,, | Performed by: OBSTETRICS & GYNECOLOGY

## 2023-08-09 PROCEDURE — 99213 PR OFFICE/OUTPT VISIT, EST, LEVL III, 20-29 MIN: ICD-10-PCS | Mod: S$PBB,,, | Performed by: OBSTETRICS & GYNECOLOGY

## 2023-08-09 PROCEDURE — 99999 PR PBB SHADOW E&M-EST. PATIENT-LVL III: CPT | Mod: PBBFAC,,,

## 2023-08-09 PROCEDURE — 1160F RVW MEDS BY RX/DR IN RCRD: CPT | Mod: CPTII,,, | Performed by: OBSTETRICS & GYNECOLOGY

## 2023-08-09 PROCEDURE — 99999 PR PBB SHADOW E&M-EST. PATIENT-LVL III: ICD-10-PCS | Mod: PBBFAC,,,

## 2023-08-09 RX ORDER — AMOXICILLIN AND CLAVULANATE POTASSIUM 500; 125 MG/1; MG/1
1 TABLET, FILM COATED ORAL 2 TIMES DAILY
Qty: 14 TABLET | Refills: 0 | Status: SHIPPED | OUTPATIENT
Start: 2023-08-09 | End: 2023-08-16

## 2023-08-09 NOTE — PROGRESS NOTES
History & Physical  Gynecology      SUBJECTIVE:     Chief Complaint:   Urinary Urgency (Painful urination)     History of Present Illness  Cari Barillas is a 37 y.o. female presents to walk in clinic for urinary urgency. Declines burning with urination but reports back pain. Denies fever. States the urgency has persisted for the past week. Also reports dull pain in urethral area prior to urination. Also reports sinus infection.     BP Readings from Last 2 Encounters:   23 110/70   23 125/74          Review of patient's allergies indicates:   Allergen Reactions    Gluten protein     Soy        Past Medical History:   Diagnosis Date    Abnormal Pap smear of cervix     Anxiety     Depression     H/O LEEP     Multiple sclerosis      Past Surgical History:   Procedure Laterality Date    CERVICAL BIOPSY  W/ LOOP ELECTRODE EXCISION      COLONOSCOPY N/A 10/25/2022    Procedure: COLONOSCOPY;  Surgeon: Janie Fleming MD;  Location: [facility];  Service: Endoscopy;  Laterality: N/A;    ESOPHAGOGASTRODUODENOSCOPY N/A 10/25/2022    Procedure: EGD (ESOPHAGOGASTRODUODENOSCOPY);  Surgeon: Janie Fleming MD;  Location: [facility];  Service: Endoscopy;  Laterality: N/A;    TONSILLECTOMY       OB History          2    Para        Term                AB   2    Living             SAB        IAB        Ectopic        Multiple        Live Births                   Family History   Problem Relation Age of Onset    Colon cancer Maternal Grandmother 78    Diabetes Maternal Grandmother     Diabetes Mother      Social History     Tobacco Use    Smoking status: Every Day     Current packs/day: 0.50     Types: Cigarettes    Smokeless tobacco: Never   Substance Use Topics    Alcohol use: Yes    Drug use: Not Currently     Types: Marijuana, LSD, Cocaine, MDMA (Ecstacy)       Current Outpatient Medications   Medication Sig    buPROPion (WELLBUTRIN XL) 300 MG 24 hr tablet Take 1 tablet  (300 mg total) by mouth every morning.    cholecalciferol, vitamin D3, 1,250 mcg (50,000 unit) capsule Take 1 capsule (50,000 Units total) by mouth once a week.    fluticasone propionate (FLONASE) 50 mcg/actuation nasal spray 1 spray by Each Nostril route once daily.    gabapentin (NEURONTIN) 300 MG capsule Take 300 mg by mouth 3 (three) times daily.    LORazepam (ATIVAN) 0.5 MG tablet Take 1 tablet (0.5 mg total) by mouth every 8 (eight) hours as needed for Anxiety.    magnesium 30 mg Tab Take 250 mg by mouth once.    methocarbamoL (ROBAXIN) 500 MG Tab Take 1 tablet (500 mg total) by mouth 4 (four) times daily as needed (muscle pain (TMJ)).    methylphenidate HCl (CONCERTA) 18 MG CR tablet Take 1 tablet every day by oral route.    methylphenidate HCl (CONCERTA) 18 MG CR tablet Take 1 tablet every day by oral route.    [START ON 9/7/2023] methylphenidate HCl (CONCERTA) 18 MG CR tablet Take 1 tablet every day by oral route.    ocrelizumab (OCREVUS IV) Inject into the vein.    WELLBUTRIN  mg 24 hr tablet Take 300 mg by mouth every morning.    amoxicillin-clavulanate 500-125mg (AUGMENTIN) 500-125 mg Tab Take 1 tablet (500 mg total) by mouth 2 (two) times daily. for 7 days     No current facility-administered medications for this visit.         Review of Systems:  Review of Systems   Constitutional:  Negative for fever.   Respiratory:  Negative for shortness of breath and wheezing.    Cardiovascular:  Negative for chest pain, palpitations and leg swelling.   Genitourinary:  Positive for dysuria and urgency.      OBJECTIVE:     Physical Exam:  Physical Exam  Constitutional:       Appearance: Normal appearance.   Cardiovascular:      Rate and Rhythm: Normal rate.   Pulmonary:      Effort: Pulmonary effort is normal.   Neurological:      Mental Status: She is alert and oriented to person, place, and time.   Psychiatric:         Mood and Affect: Mood normal.         Behavior: Behavior normal.        ASSESSMENT:       ICD-10-CM ICD-9-CM    1. Urinary urgency  R39.15 788.63 CULTURE, URINE      amoxicillin-clavulanate 500-125mg (AUGMENTIN) 500-125 mg Tab             Plan:      - U dip with leuks +2 and trace protein   - Sent for culture  - Will send Augmentin to cover both possible UTI and possible sinus infection  - Discussed going to regular urgent care/primary care if sinus symptoms persist or fever develops     Kaylie Peterson, FRANK-C  OBJANINE

## 2023-08-11 LAB
BACTERIA UR CULT: NORMAL
BACTERIA UR CULT: NORMAL

## 2023-08-11 NOTE — TELEPHONE ENCOUNTER
Faxed Hep B results to James at [facility] at [phone]. Also, made James aware pt has received her initial Ocrevus infusion s on 10/5/20 and 10/19/21. Per James, Ocrevus 600 mg q 24 weeks x 2 doses is approved from 5/4/21-12/31/21, [id number].    11-Aug-2023 14:00

## 2023-09-12 ENCOUNTER — PATIENT MESSAGE (OUTPATIENT)
Dept: NEUROLOGY | Facility: CLINIC | Age: 38
End: 2023-09-12
Payer: MEDICAID

## 2023-09-29 ENCOUNTER — PATIENT MESSAGE (OUTPATIENT)
Dept: NEUROLOGY | Facility: CLINIC | Age: 38
End: 2023-09-29
Payer: MEDICAID

## 2023-09-29 ENCOUNTER — TELEPHONE (OUTPATIENT)
Dept: NEUROLOGY | Facility: CLINIC | Age: 38
End: 2023-09-29
Payer: MEDICAID

## 2023-11-20 ENCOUNTER — OFFICE VISIT (OUTPATIENT)
Dept: NEUROLOGY | Facility: CLINIC | Age: 38
End: 2023-11-20
Payer: MEDICAID

## 2023-11-20 ENCOUNTER — LAB VISIT (OUTPATIENT)
Dept: LAB | Facility: HOSPITAL | Age: 38
End: 2023-11-20
Attending: STUDENT IN AN ORGANIZED HEALTH CARE EDUCATION/TRAINING PROGRAM
Payer: MEDICAID

## 2023-11-20 VITALS
DIASTOLIC BLOOD PRESSURE: 84 MMHG | WEIGHT: 182.75 LBS | HEART RATE: 87 BPM | SYSTOLIC BLOOD PRESSURE: 120 MMHG | BODY MASS INDEX: 31.2 KG/M2 | HEIGHT: 64 IN

## 2023-11-20 DIAGNOSIS — R53.83 FATIGUE, UNSPECIFIED TYPE: ICD-10-CM

## 2023-11-20 DIAGNOSIS — G24.5 BLEPHAROSPASM OF RIGHT EYE: ICD-10-CM

## 2023-11-20 DIAGNOSIS — G35 MULTIPLE SCLEROSIS: ICD-10-CM

## 2023-11-20 LAB
ALBUMIN SERPL BCP-MCNC: 3.6 G/DL (ref 3.5–5.2)
ALP SERPL-CCNC: 70 U/L (ref 55–135)
ALT SERPL W/O P-5'-P-CCNC: 23 U/L (ref 10–44)
ANION GAP SERPL CALC-SCNC: 9 MMOL/L (ref 8–16)
AST SERPL-CCNC: 16 U/L (ref 10–40)
BASOPHILS # BLD AUTO: 0.03 K/UL (ref 0–0.2)
BASOPHILS NFR BLD: 0.3 % (ref 0–1.9)
BILIRUB SERPL-MCNC: 0.2 MG/DL (ref 0.1–1)
BUN SERPL-MCNC: 9 MG/DL (ref 6–20)
CALCIUM SERPL-MCNC: 9.4 MG/DL (ref 8.7–10.5)
CHLORIDE SERPL-SCNC: 107 MMOL/L (ref 95–110)
CO2 SERPL-SCNC: 23 MMOL/L (ref 23–29)
CREAT SERPL-MCNC: 0.7 MG/DL (ref 0.5–1.4)
DIFFERENTIAL METHOD: ABNORMAL
EOSINOPHIL # BLD AUTO: 0.1 K/UL (ref 0–0.5)
EOSINOPHIL NFR BLD: 1.4 % (ref 0–8)
ERYTHROCYTE [DISTWIDTH] IN BLOOD BY AUTOMATED COUNT: 12.2 % (ref 11.5–14.5)
EST. GFR  (NO RACE VARIABLE): >60 ML/MIN/1.73 M^2
FERRITIN SERPL-MCNC: 61 NG/ML (ref 20–300)
GLUCOSE SERPL-MCNC: 96 MG/DL (ref 70–110)
HBV CORE AB SERPL QL IA: NORMAL
HBV SURFACE AG SERPL QL IA: NORMAL
HCT VFR BLD AUTO: 39.4 % (ref 37–48.5)
HGB BLD-MCNC: 13.3 G/DL (ref 12–16)
IGA SERPL-MCNC: 111 MG/DL (ref 40–350)
IGG SERPL-MCNC: 602 MG/DL (ref 650–1600)
IGM SERPL-MCNC: 56 MG/DL (ref 50–300)
IMM GRANULOCYTES # BLD AUTO: 0.03 K/UL (ref 0–0.04)
IMM GRANULOCYTES NFR BLD AUTO: 0.3 % (ref 0–0.5)
IRON SERPL-MCNC: 80 UG/DL (ref 30–160)
LYMPHOCYTES # BLD AUTO: 2.8 K/UL (ref 1–4.8)
LYMPHOCYTES NFR BLD: 28.2 % (ref 18–48)
MCH RBC QN AUTO: 31.4 PG (ref 27–31)
MCHC RBC AUTO-ENTMCNC: 33.8 G/DL (ref 32–36)
MCV RBC AUTO: 93 FL (ref 82–98)
MONOCYTES # BLD AUTO: 0.7 K/UL (ref 0.3–1)
MONOCYTES NFR BLD: 6.7 % (ref 4–15)
NEUTROPHILS # BLD AUTO: 6.2 K/UL (ref 1.8–7.7)
NEUTROPHILS NFR BLD: 63.1 % (ref 38–73)
NRBC BLD-RTO: 0 /100 WBC
PLATELET # BLD AUTO: 273 K/UL (ref 150–450)
PMV BLD AUTO: 12.3 FL (ref 9.2–12.9)
POTASSIUM SERPL-SCNC: 3.7 MMOL/L (ref 3.5–5.1)
PROT SERPL-MCNC: 6.5 G/DL (ref 6–8.4)
RBC # BLD AUTO: 4.24 M/UL (ref 4–5.4)
SATURATED IRON: 23 % (ref 20–50)
SODIUM SERPL-SCNC: 139 MMOL/L (ref 136–145)
TOTAL IRON BINDING CAPACITY: 355 UG/DL (ref 250–450)
TRANSFERRIN SERPL-MCNC: 240 MG/DL (ref 200–375)
TSH SERPL DL<=0.005 MIU/L-ACNC: 2.15 UIU/ML (ref 0.4–4)
WBC # BLD AUTO: 9.89 K/UL (ref 3.9–12.7)

## 2023-11-20 PROCEDURE — 99214 PR OFFICE/OUTPT VISIT, EST, LEVL IV, 30-39 MIN: ICD-10-PCS | Mod: S$PBB,,, | Performed by: STUDENT IN AN ORGANIZED HEALTH CARE EDUCATION/TRAINING PROGRAM

## 2023-11-20 PROCEDURE — 1159F PR MEDICATION LIST DOCUMENTED IN MEDICAL RECORD: ICD-10-PCS | Mod: CPTII,,, | Performed by: STUDENT IN AN ORGANIZED HEALTH CARE EDUCATION/TRAINING PROGRAM

## 2023-11-20 PROCEDURE — 3079F DIAST BP 80-89 MM HG: CPT | Mod: CPTII,,, | Performed by: STUDENT IN AN ORGANIZED HEALTH CARE EDUCATION/TRAINING PROGRAM

## 2023-11-20 PROCEDURE — 83540 ASSAY OF IRON: CPT | Performed by: STUDENT IN AN ORGANIZED HEALTH CARE EDUCATION/TRAINING PROGRAM

## 2023-11-20 PROCEDURE — 84466 ASSAY OF TRANSFERRIN: CPT | Performed by: STUDENT IN AN ORGANIZED HEALTH CARE EDUCATION/TRAINING PROGRAM

## 2023-11-20 PROCEDURE — 82941 ASSAY OF GASTRIN: CPT | Performed by: STUDENT IN AN ORGANIZED HEALTH CARE EDUCATION/TRAINING PROGRAM

## 2023-11-20 PROCEDURE — 80053 COMPREHEN METABOLIC PANEL: CPT | Performed by: STUDENT IN AN ORGANIZED HEALTH CARE EDUCATION/TRAINING PROGRAM

## 2023-11-20 PROCEDURE — 87340 HEPATITIS B SURFACE AG IA: CPT | Performed by: STUDENT IN AN ORGANIZED HEALTH CARE EDUCATION/TRAINING PROGRAM

## 2023-11-20 PROCEDURE — 99214 OFFICE O/P EST MOD 30 MIN: CPT | Mod: PBBFAC | Performed by: STUDENT IN AN ORGANIZED HEALTH CARE EDUCATION/TRAINING PROGRAM

## 2023-11-20 PROCEDURE — 3074F SYST BP LT 130 MM HG: CPT | Mod: CPTII,,, | Performed by: STUDENT IN AN ORGANIZED HEALTH CARE EDUCATION/TRAINING PROGRAM

## 2023-11-20 PROCEDURE — 3074F PR MOST RECENT SYSTOLIC BLOOD PRESSURE < 130 MM HG: ICD-10-PCS | Mod: CPTII,,, | Performed by: STUDENT IN AN ORGANIZED HEALTH CARE EDUCATION/TRAINING PROGRAM

## 2023-11-20 PROCEDURE — 86704 HEP B CORE ANTIBODY TOTAL: CPT | Performed by: STUDENT IN AN ORGANIZED HEALTH CARE EDUCATION/TRAINING PROGRAM

## 2023-11-20 PROCEDURE — 85025 COMPLETE CBC W/AUTO DIFF WBC: CPT | Performed by: STUDENT IN AN ORGANIZED HEALTH CARE EDUCATION/TRAINING PROGRAM

## 2023-11-20 PROCEDURE — 84443 ASSAY THYROID STIM HORMONE: CPT | Performed by: STUDENT IN AN ORGANIZED HEALTH CARE EDUCATION/TRAINING PROGRAM

## 2023-11-20 PROCEDURE — 3008F BODY MASS INDEX DOCD: CPT | Mod: CPTII,,, | Performed by: STUDENT IN AN ORGANIZED HEALTH CARE EDUCATION/TRAINING PROGRAM

## 2023-11-20 PROCEDURE — 82607 VITAMIN B-12: CPT | Performed by: STUDENT IN AN ORGANIZED HEALTH CARE EDUCATION/TRAINING PROGRAM

## 2023-11-20 PROCEDURE — 82784 ASSAY IGA/IGD/IGG/IGM EACH: CPT | Mod: 59 | Performed by: STUDENT IN AN ORGANIZED HEALTH CARE EDUCATION/TRAINING PROGRAM

## 2023-11-20 PROCEDURE — 82728 ASSAY OF FERRITIN: CPT | Performed by: STUDENT IN AN ORGANIZED HEALTH CARE EDUCATION/TRAINING PROGRAM

## 2023-11-20 PROCEDURE — 0361U NEURFLMNT LT CHN DIG IA QUAN: CPT | Performed by: STUDENT IN AN ORGANIZED HEALTH CARE EDUCATION/TRAINING PROGRAM

## 2023-11-20 PROCEDURE — 3079F PR MOST RECENT DIASTOLIC BLOOD PRESSURE 80-89 MM HG: ICD-10-PCS | Mod: CPTII,,, | Performed by: STUDENT IN AN ORGANIZED HEALTH CARE EDUCATION/TRAINING PROGRAM

## 2023-11-20 PROCEDURE — 86340 INTRINSIC FACTOR ANTIBODY: CPT | Performed by: STUDENT IN AN ORGANIZED HEALTH CARE EDUCATION/TRAINING PROGRAM

## 2023-11-20 PROCEDURE — 3008F PR BODY MASS INDEX (BMI) DOCUMENTED: ICD-10-PCS | Mod: CPTII,,, | Performed by: STUDENT IN AN ORGANIZED HEALTH CARE EDUCATION/TRAINING PROGRAM

## 2023-11-20 PROCEDURE — 99214 OFFICE O/P EST MOD 30 MIN: CPT | Mod: S$PBB,,, | Performed by: STUDENT IN AN ORGANIZED HEALTH CARE EDUCATION/TRAINING PROGRAM

## 2023-11-20 PROCEDURE — 99999 PR PBB SHADOW E&M-EST. PATIENT-LVL IV: ICD-10-PCS | Mod: PBBFAC,,, | Performed by: STUDENT IN AN ORGANIZED HEALTH CARE EDUCATION/TRAINING PROGRAM

## 2023-11-20 PROCEDURE — 99999 PR PBB SHADOW E&M-EST. PATIENT-LVL IV: CPT | Mod: PBBFAC,,, | Performed by: STUDENT IN AN ORGANIZED HEALTH CARE EDUCATION/TRAINING PROGRAM

## 2023-11-20 PROCEDURE — 1159F MED LIST DOCD IN RCRD: CPT | Mod: CPTII,,, | Performed by: STUDENT IN AN ORGANIZED HEALTH CARE EDUCATION/TRAINING PROGRAM

## 2023-11-20 RX ORDER — ASPIRIN 325 MG
50000 TABLET, DELAYED RELEASE (ENTERIC COATED) ORAL WEEKLY
Qty: 12 CAPSULE | Refills: 3 | Status: SHIPPED | OUTPATIENT
Start: 2023-11-20 | End: 2024-11-19

## 2023-11-20 NOTE — PROGRESS NOTES
"[facility]  Follow Up Patient Visit      Disease Summary     Principle neurological diagnosis: MS    Date of symptom onset: 6/2020  Date of diagnosis: 6/2020  Disease type at diagnosis: RR  Disease type currently: RR  Previous therapy: IVMP, Copaxone (7/2020 - 9/2020, breakthrough lesion)  Current therapy: Ocrevus 10/2020 - present  Last MRI Brain: 12/6/22 stable  Last MRI C-spine: 12/7/21 improved  Last MRI T-spine:  12/7/21  CSF: 2020 13 OCBs, IgG index 1.52  JCV status: 2020 0.66 infex  Other relevant labs and tests: Vit D 35    Interval history:     Tolerating Ocrevus infusion, last infused Jul 2023 next dose in Jan 2024. Denies infections.   In late Oct she had a vomiting/diarrhea GI issues, recovered from it since.     She felt worse in the past 3-4 weeks, worsening fatigue, trouble working through entire shift. This feel different than her "crap gap"   Rituxan sensitivity in 5/2023 showed 0 CD19 and CD20 cells.     In terms of MS symptoms:  Tingling in her LUE and LLE and L buttocks, and RUE and RLE more persistently  R eye twitching when she's tired at night  More frequent stabbing pain     She takes weekly [id number] vit D but inconsistently, prescription has been changed to Q3 month supply for convenience     ROS:     SOCIAL HISTORY    Works in [employer].     Social History     Socioeconomic History    Marital status: Single   Tobacco Use    Smoking status: Every Day     Current packs/day: 0.50     Types: Cigarettes    Smokeless tobacco: Never   Substance and Sexual Activity    Alcohol use: Yes    Drug use: Not Currently     Types: Marijuana, LSD, Cocaine, MDMA (Ecstacy)    Sexual activity: Yes     Partners: Male     Birth control/protection: None           2/9/2021     8:31 AM   REVIEW OF SYMPTOMS   Do you feel abnormally tired on most days? Yes   Do you feel you generally sleep well? No   Do you have difficulty controlling your bladder?  No   Do you have difficulty controlling " your bowels?  No   Do you have frequent muscle cramps, tightness or spasms in your limbs?  Yes   Do you have new visual symptoms?  No   Do you have worsening difficulty with your memory or thinking? No   Do you have worsening symptoms of anxiety or depression?  No   For patients who walk, Do you have more difficulty walking?  No   Have you fallen since your last visit?  No   For patients who use wheelchairs: Do you have any skin wounds or breakdown? Not Applicable   Do you have difficulty using your hands?  No   Do you have shooting or burning pain? Yes   Do you have difficulty with sexual function?  No   If you are sexually active, are you using birth control? Y/N  N/A No   Do you often choke when swallowing liquids or solid food?  No   Do you experience worsening symptoms when overheated? Yes   Do you need any new equipment such as a wheelchair, walker or shower chair? No   Do you receive co-pay financial assistance for your principal MS medicine? No   Would you be interested in participating in an MS research trial in the future? Yes   For patients on Gilenya, Tecfidera, Aubagio, Rituxan, Ocrevus, Tysabri, Lemtrada or Methotrexate, are you aware that you should NOT receive live virus vaccines?  Yes   Do you feel you have adequate family/friend support?  Yes   Do you have health insurance?   Yes   Are you currently employed? Yes   Do you receive SSDI/SSI?  Not Applicable   Do you use marijuana or cannabis products? No   Have you been diagnosed with a urinary tract infection since your last visit here? No   Have you been diagnosed with a respiratory tract infection since your last visit here? No   Have you been to the emergency room since your last visit here? No   Have you been hospitalized since your last visit here?  No         2/9/2021     8:33 AM   FSS SCORE & INTERPRETATION   FSS SCORE  48   FSS SCORE INTERPRETATION May be suffering from fatigue         2/9/2021     8:33 AM   MS RITA-D SCORE & INTERPRETATION    RITA-D SCORE  10   RITA-D INTERPRETATION  No indication of Depression         2/9/2021     8:31 AM   MS KOSTAS-7 SCORE & INTERPRETATION   KOSTAS-7 SCORE  6   KOSTAS-7 SCORE INTERPRETATION Mild Anxiety         2/9/2021     8:34 AM   PEQ MS MOS PAIN EFFECTS SCORE & INTERPRETATION   PES SCORE 10   PES SCORE INTERPRETATION Scores can range from 6-30.  Items are scaled so that higher scores indicate a greater impact of pain on a patients mood and behavior.          No data to display                  2/9/2021     8:34 AM   MS BLADDER CONTROL SCORE & INTERPRETATION   BLCS SCORE 0   BLCS SCORE INTERPRETATION  Scores can range from 0-22, with higher scores indicating greater bladder control problems.         2/9/2021     8:40 AM   MS BOWEL CONTROL SCORE & INTERPRETATION   BWCS SCORE 0   BWCS SCORE INTERPRETATION Scores can range from 0-26, with higher scores indicating greater bowel control problems.         2/9/2021     8:34 AM   PEQ MS IMPACT OF VISUAL IMPAIRMENT SCORE & INTERPRETATION   STANISLAW SCALE SCORE  0   STANISLAW SCORE INTERPRETATION Scores can range from 0-15, with higher scores indicating greater impact of visual problems on daily activites.         2/9/2021     8:40 AM   MS PDQ SCORE & INTERPRETATION   PDQ RETROSPECTIVE MEMORY SUBSCALE 8   PDQ ATTENTION/CONCENTRATION SUBSCALE 12   PDQ PROSPECTIVE MEMORY SUBSCALE 10   PDQ PLANNING/ORGANIZATION SUBSCALE 11   PDQ TOTAL SCORE 41   PDQ SCORE INTERPRETATION Scores can range from 0-80, with higher scores indicating greater perceived cognitive impairment.         2/9/2021     8:41 AM   MSSS SCORE & INTERPRETATION   MSSS TANGIBLE SUPPORT SUBSCALE 62.5   MSSS EMOTIONAL/INFORMATIONAL SUPPORT SUBSCALE 100   MSSS AFFECTIONATE SUPPORT SUBSCALE 75   MSSS POSITIVE SOCIAL INTERACTION SUBSCALE 66.67   MSSS TOTAL SCORE 76.04   MSSS SCORE INTERPRETATION Scores can range from 0-100, with higher scores indicating greater perceived support.         Exam:     Vitals:    11/20/23 0814   BP: 120/84  "  Pulse: 87   Weight: 82.9 kg (182 lb 12.2 oz)   Height: 5' 4" (1.626 m)          In general, the patient is well nourished and appears to be in no acute distress.    MENTAL STATUS: language is fluent, normal verbal comprehension, attention is normal, patient is alert and oriented, fund of knowlege is appropriate by vocabulary.     CRANIAL NERVE EXAM:  There is no intrernuclear ophthalmoplegia.  Extraocular muscles are intact. Pupils are equal, round, and reactive to light. No facial asymmetry. Facial sensation is intact bilaterally. There is no dysarthria. Uvula is midline, and palate moves symmetrically. Shoulder shrug intact bilaterlly. Tongue protrusion is midline. Hearing is intact to finger rub bilaterally. Neck is supple with full ROM  Neg Lhermitte's    MOTOR EXAM: Normal bulk and tone throughout UE and LE bilaterally.   No pronator drift; rapid sequential movements are normal; Strength is  5/5 in all groups in the lower extremities and upper extremities    SENSORY EXAM: Normal to light touch, pinprick throughout.    COORDINATION: Normal finger-to-nose exam. Normal heel-to-shin exam.    GAIT: Narrow based and stable.        7/8/2022    12:01 AM 11/20/2023    12:01 AM   Timed 25 Foot Walk:   Did patient wear an AFO? No No   Was assistive device used? No No   Time for 25 Foot Walk (seconds) 4.2 4.66   Time for 25 Foot Walk (seconds) 4 4.33         Imaging (personally reviewed):           Results for orders placed during the hospital encounter of 12/16/22    MRI Brain Demyelinating Without Contrast    Impression  Stable white matter lesions intracranially.  No new lesion when compared to the prior study.    Electronically signed by resident: Levon Ortega  Date:    12/16/2022  Time:    15:50    Electronically signed by: Kimo Matta  Date:    12/16/2022  Time:    16:08      Results for orders placed during the hospital encounter of 12/07/21    MRI Brain Demyelinating W W/O Contrast    Impression  No " significant change from prior.  Few scattered small to punctate foci of T2 FLAIR signal hyperintensity supratentorial white matter while nonspecific remain concerning for prior areas of demyelination in light of history    No definite new lesion or enhancing lesion to suggest significant interval or active demyelination.    Clinical correlation and follow-up advised.      Electronically signed by: Mal Wiggins DO  Date:    12/08/2021  Time:    07:44    Results for orders placed during the hospital encounter of 12/07/21    MRI Cervical Spine Demyelinating W W/O Contrast    Impression  Previous partially visualized T2 stir signal abnormality within the cervical cord C6/C7 on recent thoracic spine imaging no longer seen.  There is no current cord signal abnormality cervical or thoracic cord or abnormal enhancement to suggest definite interval or active demyelination in light of history..    No significant disc bulge, central canal or neural foraminal stenosis at the cervical or thoracic canal.      Electronically signed by: Mal Wiggins DO  Date:    12/08/2021  Time:    08:23    Results for orders placed during the hospital encounter of 12/07/21    MRI Thoracic Spine Demyelinating W W/O Contrast    Impression  Previous partially visualized T2 stir signal abnormality within the cervical cord C6/C7 on recent thoracic spine imaging no longer seen.  There is no current cord signal abnormality cervical or thoracic cord or abnormal enhancement to suggest definite interval or active demyelination in light of history..    No significant disc bulge, central canal or neural foraminal stenosis at the cervical or thoracic canal.      Electronically signed by: Mal Wiggins DO  Date:    12/08/2021  Time:    08:23      Labs:     Lab Results   Component Value Date    UXPMRJZC27SH 35 06/30/2023    YLEMYPFM64BJ 26 (L) 01/18/2022    AJQXYEEB58FF 24 (L) 04/14/2021     Lab Results   Component Value Date    JCVINDEX 0.66 (H) 06/24/2020     JCVANTIBODY POSITIVE (A) 06/24/2020     Lab Results   Component Value Date    PS8VGMSZ 82.3 06/24/2020    ABSOLUTECD3 2529.0 (H) 06/24/2020    IP7DXMDJ 15.6 06/24/2020    ABSOLUTECD8 481.0 06/24/2020    NX8HHIWR 66.1 (H) 06/24/2020    ABSOLUTECD4 2031.0 (H) 06/24/2020    LABCD48 4.22 (H) 06/24/2020     Lab Results   Component Value Date    WBC 11.82 07/13/2023    RBC 4.52 07/13/2023    HGB 14.3 07/13/2023    HCT 42.7 07/13/2023    MCV 95 07/13/2023    MCH 31.6 (H) 07/13/2023    MCHC 33.5 07/13/2023    RDW 12.4 07/13/2023     07/13/2023    MPV 12.0 07/13/2023    GRAN 8.0 (H) 07/13/2023    GRAN 67.3 07/13/2023    LYMPH 2.8 07/13/2023    LYMPH 24.0 07/13/2023    MONO 0.8 07/13/2023    MONO 6.9 07/13/2023    EOS 0.1 07/13/2023    BASO 0.05 07/13/2023    EOSINOPHIL 0.9 07/13/2023    BASOPHIL 0.4 07/13/2023     Sodium   Date Value Ref Range Status   06/30/2023 139 136 - 145 mmol/L Final     Potassium   Date Value Ref Range Status   06/30/2023 3.5 3.5 - 5.1 mmol/L Final     Chloride   Date Value Ref Range Status   06/30/2023 104 95 - 110 mmol/L Final     CO2   Date Value Ref Range Status   06/30/2023 23 23 - 29 mmol/L Final     Glucose   Date Value Ref Range Status   06/30/2023 112 (H) 70 - 110 mg/dL Final     BUN   Date Value Ref Range Status   06/30/2023 9 6 - 20 mg/dL Final     Creatinine   Date Value Ref Range Status   06/30/2023 1.0 0.5 - 1.4 mg/dL Final     Calcium   Date Value Ref Range Status   06/30/2023 10.0 8.7 - 10.5 mg/dL Final     Total Protein   Date Value Ref Range Status   06/30/2023 7.1 6.0 - 8.4 g/dL Final     Albumin   Date Value Ref Range Status   06/30/2023 4.2 3.5 - 5.2 g/dL Final     Total Bilirubin   Date Value Ref Range Status   06/30/2023 0.4 0.1 - 1.0 mg/dL Final     Comment:     For infants and newborns, interpretation of results should be based  on gestational age, weight and in agreement with clinical  observations.    Premature Infant recommended reference ranges:  Up to 24  hours.............<8.0 mg/dL  Up to 48 hours............<12.0 mg/dL  3-5 days..................<15.0 mg/dL  6-29 days.................<15.0 mg/dL       Alkaline Phosphatase   Date Value Ref Range Status   06/30/2023 66 55 - 135 U/L Final     AST   Date Value Ref Range Status   06/30/2023 18 10 - 40 U/L Final     ALT   Date Value Ref Range Status   06/30/2023 20 10 - 44 U/L Final     Anion Gap   Date Value Ref Range Status   06/30/2023 12 8 - 16 mmol/L Final     eGFR if    Date Value Ref Range Status   01/18/2022 >60.0 >60 mL/min/1.73 m^2 Final     eGFR if non    Date Value Ref Range Status   01/18/2022 >60.0 >60 mL/min/1.73 m^2 Final     Comment:     Calculation used to obtain the estimated glomerular filtration  rate (eGFR) is the CKD-EPI equation.                   Diagnosis/Assessment/Plan:     Multiple Sclerosis  -Assessment: RRMS with mild intracranial lesions, and previously reported cervical spine lesion that has since then improved. Exam stable. Given patient's worsening severe fatigue, will update imaging and check labs to evaluate if pseudo-flare vs true relapse.   -Imaging:Updated MRI brain and spine WWO   -Disease Modifying Therapies: Continue ocrevus, safety labs today  Symptom management   Updated labs for fatigue as stated above   Encouraged patient to continue vit D weekly supplement  Plan discussed and questions were answered to satisfaction.  Return to clinic after MRI         NEURO MULTIPLE SCLEROSIS IMPRESSION:   MS Status:     Number of relapses in the past year?:  0    Clinical Progression:  Clinically Stable    MRI Progression:  Stable  Plan:     DMT:  No change in management    Symptom Management:  Implement change in symptom management    Implement Change in Symptom Management:  Fatigue        Total time spent with the patient: 30 minutes, including face to face consultation, chart review and coordination of care, on the day of the visit. This includes face to  face time and non-face to face time preparing to see the patient (eg, review of tests), obtaining and/or reviewing separately obtained history, documenting clinical information in the electronic or other health record, independently interpreting resultsand communicating results to the patient/family/caregiver, or care coordination.         Nano Becerra MD, MSc  Attending neurologist

## 2023-11-21 LAB
ANNOTATION COMMENT IMP: NORMAL
IF BLOCK AB SER QL: NEGATIVE
VIT B12 SERPL-MCNC: 146 NG/L (ref 180–914)

## 2023-11-22 LAB
GASTRIN P FAST SERPL-MCNC: <10 PG/ML
NEUROFILAMENT LIGHT CHAIN, PLASMA: 7.6 PG/ML

## 2023-11-24 ENCOUNTER — PATIENT MESSAGE (OUTPATIENT)
Dept: NEUROLOGY | Facility: CLINIC | Age: 38
End: 2023-11-24
Payer: MEDICAID

## 2023-11-30 ENCOUNTER — TELEPHONE (OUTPATIENT)
Dept: OBSTETRICS AND GYNECOLOGY | Facility: CLINIC | Age: 38
End: 2023-11-30
Payer: MEDICAID

## 2023-11-30 DIAGNOSIS — N39.0 URINARY TRACT INFECTION WITHOUT HEMATURIA, SITE UNSPECIFIED: Primary | ICD-10-CM

## 2023-11-30 RX ORDER — CYANOCOBALAMIN 1000 UG/ML
INJECTION, SOLUTION INTRAMUSCULAR; SUBCUTANEOUS
Qty: 13 ML | Refills: 0 | Status: SHIPPED | OUTPATIENT
Start: 2023-11-30 | End: 2023-12-28 | Stop reason: SDUPTHER

## 2023-11-30 NOTE — TELEPHONE ENCOUNTER
Attempted to call pt in regards to schedule lab apt for urine culture no answer Lvm   ----- Message from Marianne Rizo sent at 11/30/2023 11:14 AM CST -----  Pt called in stating she would like something called in for a uti. She states she took otc meds with no relief. She was offered an appointment with [facility], but declined stating she knows it's a uti. Pls advise pt's pharmacy  is on file.

## 2023-12-01 ENCOUNTER — LAB VISIT (OUTPATIENT)
Dept: LAB | Facility: OTHER | Age: 38
End: 2023-12-01
Attending: OBSTETRICS & GYNECOLOGY
Payer: MEDICAID

## 2023-12-01 ENCOUNTER — TELEPHONE (OUTPATIENT)
Dept: OBSTETRICS AND GYNECOLOGY | Facility: CLINIC | Age: 38
End: 2023-12-01
Payer: MEDICAID

## 2023-12-01 DIAGNOSIS — N39.0 URINARY TRACT INFECTION WITHOUT HEMATURIA, SITE UNSPECIFIED: ICD-10-CM

## 2023-12-01 PROCEDURE — 87088 URINE BACTERIA CULTURE: CPT | Performed by: OBSTETRICS & GYNECOLOGY

## 2023-12-01 PROCEDURE — 87077 CULTURE AEROBIC IDENTIFY: CPT | Performed by: OBSTETRICS & GYNECOLOGY

## 2023-12-01 PROCEDURE — 87186 SC STD MICRODIL/AGAR DIL: CPT | Performed by: OBSTETRICS & GYNECOLOGY

## 2023-12-01 PROCEDURE — 87086 URINE CULTURE/COLONY COUNT: CPT | Performed by: OBSTETRICS & GYNECOLOGY

## 2023-12-01 NOTE — TELEPHONE ENCOUNTER
Attempted to call pt in regards to schedule pt for a urine culture no answer LVM     ----- Message from Julie R. Jeansonne, MD sent at 11/30/2023 12:48 PM CST -----  Regarding: FW: Returning Call    ----- Message -----  From: Elizabet Srivastava  Sent: 11/30/2023  12:38 PM CST  To: Jeansonne Julie Staff  Subject: Returning Call                                                 Name of Who is Calling:  Cari Barillas    Who Left The Message:  Cari Barillas    Message Is For:  Mi      What is the request in detail:    Patient called stating she's returning your call and would like you to please call again.   Thank you       Reply by [facility]: NO       Preferred Call Back  : [phone] (J)

## 2023-12-03 LAB — BACTERIA UR CULT: ABNORMAL

## 2023-12-04 ENCOUNTER — PATIENT MESSAGE (OUTPATIENT)
Dept: OBSTETRICS AND GYNECOLOGY | Facility: CLINIC | Age: 38
End: 2023-12-04
Payer: MEDICAID

## 2023-12-04 DIAGNOSIS — M79.2 NEUROPATHIC PAIN: ICD-10-CM

## 2023-12-04 RX ORDER — NITROFURANTOIN 25; 75 MG/1; MG/1
100 CAPSULE ORAL 2 TIMES DAILY
Qty: 14 CAPSULE | Refills: 0 | Status: SHIPPED | OUTPATIENT
Start: 2023-12-04 | End: 2023-12-11

## 2023-12-04 NOTE — TELEPHONE ENCOUNTER
No care due was identified.  [facility] Embedded Care Due Messages. Reference number: [id number].   12/04/2023 10:58:00 AM CST

## 2023-12-05 ENCOUNTER — PATIENT MESSAGE (OUTPATIENT)
Dept: NEUROLOGY | Facility: CLINIC | Age: 38
End: 2023-12-05
Payer: MEDICAID

## 2023-12-06 ENCOUNTER — TELEPHONE (OUTPATIENT)
Dept: NEUROLOGY | Facility: CLINIC | Age: 38
End: 2023-12-06
Payer: MEDICAID

## 2023-12-06 NOTE — TELEPHONE ENCOUNTER
----- Message from Adriana Roque RN sent at 12/5/2023  1:34 PM CST -----  Regarding: FW: Injection Help  Contact: pt.  [phone]    ----- Message -----  From: April Saunders  Sent: 12/5/2023   1:29 PM CST  To: Hernan DEGROOT Staff  Subject: Injection Help                                   Pt is calling in ref to medication cyanocobalamin 1,000 mcg/mL injection. She says she would like to speak with the nurse she is having trouble drawing the liquid out. Patient Requesting Call Back @ pt. [phone]

## 2023-12-12 ENCOUNTER — TELEPHONE (OUTPATIENT)
Dept: NEUROLOGY | Facility: CLINIC | Age: 38
End: 2023-12-12

## 2023-12-13 ENCOUNTER — HOSPITAL ENCOUNTER (OUTPATIENT)
Dept: RADIOLOGY | Facility: HOSPITAL | Age: 38
Discharge: HOME | End: 2023-12-13
Attending: STUDENT IN AN ORGANIZED HEALTH CARE EDUCATION/TRAINING PROGRAM
Payer: MEDICAID

## 2023-12-13 DIAGNOSIS — G35 MULTIPLE SCLEROSIS: ICD-10-CM

## 2023-12-13 PROCEDURE — 70553 MRI BRAIN STEM W/O & W/DYE: CPT | Mod: 26,,, | Performed by: RADIOLOGY

## 2023-12-13 PROCEDURE — 72156 MRI NECK SPINE W/O & W/DYE: CPT | Mod: TC

## 2023-12-13 PROCEDURE — 72156 MRI NECK SPINE W/O & W/DYE: CPT | Mod: 26,,, | Performed by: RADIOLOGY

## 2023-12-13 PROCEDURE — A9585 GADOBUTROL INJECTION: HCPCS | Performed by: STUDENT IN AN ORGANIZED HEALTH CARE EDUCATION/TRAINING PROGRAM

## 2023-12-13 PROCEDURE — 70553 MRI BRAIN STEM W/O & W/DYE: CPT | Mod: TC

## 2023-12-13 PROCEDURE — 72157 MRI CHEST SPINE W/O & W/DYE: CPT | Mod: 26,,, | Performed by: RADIOLOGY

## 2023-12-13 PROCEDURE — 72157 MRI CHEST SPINE W/O & W/DYE: CPT | Mod: TC

## 2023-12-13 PROCEDURE — 72157 MRI THORACIC SPINE DEMYELINATING W W/O CONTRAST: ICD-10-PCS | Mod: 26,,, | Performed by: RADIOLOGY

## 2023-12-13 PROCEDURE — 70553 MRI BRAIN DEMYELINATING W/ WO CONTRAST: ICD-10-PCS | Mod: 26,,, | Performed by: RADIOLOGY

## 2023-12-13 PROCEDURE — 25500020 PHARM REV CODE 255: Performed by: STUDENT IN AN ORGANIZED HEALTH CARE EDUCATION/TRAINING PROGRAM

## 2023-12-13 PROCEDURE — 72156 MRI CERVICAL SPINE DEMYELINATING W W/O CONTRAST: ICD-10-PCS | Mod: 26,,, | Performed by: RADIOLOGY

## 2023-12-13 RX ORDER — METHOCARBAMOL 500 MG/1
500 TABLET, FILM COATED ORAL 4 TIMES DAILY PRN
Qty: 30 TABLET | Refills: 2 | Status: SHIPPED | OUTPATIENT
Start: 2023-12-13

## 2023-12-13 RX ORDER — GADOBUTROL 604.72 MG/ML
9 INJECTION INTRAVENOUS
Status: COMPLETED | OUTPATIENT
Start: 2023-12-13 | End: 2023-12-13

## 2023-12-13 RX ADMIN — GADOBUTROL 9 ML: 604.72 INJECTION INTRAVENOUS at 04:12

## 2023-12-22 NOTE — PROGRESS NOTES
Subjective:          Patient ID: aCri Barillas is a 38 y.o. female who presents today for a routine clinic visit for MS.  She was last seen in November by Dr. Becerra. The history has been provided by the patient.     MS HPI:  DMT: Ocrevus   Side effects from DMT? No   Taking vitamin D3 as recommended? Yes   She has started B12 supplements. The injections have been helpful. The eye twitching has resolved.   Recent MRIs were stable.   She feels better than she did at the last visit.   She has been working a lot, so has little time for exercise.   She denies any infections since the last visit.   She became short of breath when putting away her laundry the other day, and this was unusual for her. She denies any significant cough. She is not sure if she has shortness of breath at rest. She denies chest pain.   She has noticed some bleeding in her gums for the past 2-3 weeks.   She describes episodes of confusion recently. She got lost in the parking garage at [facility] and felt disoriented. She also feels like she spaces out sometimes when talking to people.     Medications:  Current Outpatient Medications   Medication Sig    buPROPion (WELLBUTRIN XL) 300 MG 24 hr tablet Take 1 tablet (300 mg total) by mouth every morning.    cholecalciferol, vitamin D3, 1,250 mcg (50,000 unit) capsule Take 1 capsule (50,000 Units total) by mouth once a week.    cyanocobalamin 1,000 mcg/mL injection Inject 1 mL (1,000 mcg total) into the muscle once daily for 7 days, THEN 1 mL (1,000 mcg total) once a week for 28 days, THEN 1 mL (1,000 mcg total) every 30 days.    fluticasone propionate (FLONASE) 50 mcg/actuation nasal spray 1 spray by Each Nostril route once daily.    gabapentin (NEURONTIN) 300 MG capsule Take 300 mg by mouth 3 (three) times daily.    LORazepam (ATIVAN) 1 MG tablet Take 1 tablet every day by oral route as needed for 30 days.    magnesium 30 mg Tab Take 250 mg by mouth once.    methocarbamoL (ROBAXIN) 500 MG Tab Take  1 tablet (500 mg total) by mouth 4 (four) times daily as needed (muscle pain (TMJ)).    methylphenidate HCl (CONCERTA) 18 MG CR tablet Take 1 tablet every day by oral route.    methylphenidate HCl (RITALIN) 10 MG tablet Take 1 tablet every day by oral route in the morning.    ocrelizumab (OCREVUS IV) Inject into the vein.    WELLBUTRIN  mg 24 hr tablet Take 300 mg by mouth every morning.         SOCIAL HISTORY  Social History     Tobacco Use    Smoking status: Every Day     Current packs/day: 0.50     Types: Cigarettes    Smokeless tobacco: Never   Substance Use Topics    Alcohol use: Yes    Drug use: Not Currently     Types: Marijuana, LSD, Cocaine, MDMA (Ecstacy)       Living arrangements - the patient lives with a roommate    ROS:        12/28/23    REVIEW OF SYMPTOMS   Do you feel abnormally tired on most days? Yes--improved    Do you feel you generally sleep well? No--wakes up a lot at night; takes lorazepam as needed   Do you have difficulty controlling your bladder?  No--has had UTIs in the past 6 months    Do you have difficulty controlling your bowels?  No   Do you have frequent muscle cramps, tightness or spasms in your limbs?  Yes--usually manageable; tries to stretch   Do you have new visual symptoms?  No--wears glasses for reading    Do you have worsening difficulty with your memory or thinking? No--She had trouble finding her car recently in a parking lot; felt disoriented. She felt like she couldn't make sense of the signs. She has episodes during which she feels like she spaces out. She thinks these last 10-30 seconds, then resolve.    Do you have worsening symptoms of anxiety or depression?  No--some situational depression/anxiety related to work. She has a psychiatrist at [facility] in --Dr. Mari Jacobson.    For patients who walk, Do you have more difficulty walking?  No--hard to get started in the morning; had some vertigo the day after Halloween    Have you fallen since your  last visit?  No--she does not fall to the ground; she feels off balance sometimes   For patients who use wheelchairs: Do you have any skin wounds or breakdown? Not Applicable   Do you have difficulty using your hands?  No--has trouble twisting caps off of things sometimes   Do you have shooting or burning pain? No    Do you have difficulty with sexual function?  Not assessed    If you are sexually active, are you using birth control? Y/N  N/A No   Do you often choke when swallowing liquids or solid food?  No   Do you experience worsening symptoms when overheated? Yes--not affected by cold    Do you need any new equipment such as a wheelchair, walker or shower chair? No   Do you receive co-pay financial assistance for your principal MS medicine? No   Would you be interested in participating in an MS research trial in the future? Yes   For patients on Gilenya, Tecfidera, Aubagio, Rituxan, Ocrevus, Tysabri, Lemtrada or Methotrexate, are you aware that you should NOT receive live virus vaccines?  Yes   Do you feel you have adequate family/friend support?  Yes   Do you have health insurance?   Yes   Are you currently employed? Yes   Do you receive SSDI/SSI?  Not Applicable   Do you use marijuana or cannabis products? No   Have you been diagnosed with a urinary tract infection since your last visit here? No   Have you been diagnosed with a respiratory tract infection since your last visit here? No   Have you been to the emergency room since your last visit here? No   Have you been hospitalized since your last visit here?  No              Objective:        1. 25 foot timed walk:      11/20/2023    12:01 AM 12/28/2023    12:02 AM   Timed 25 Foot Walk:   Did patient wear an AFO? No No   Was assistive device used? No No   Time for 25 Foot Walk (seconds) 4.66 3.3   Time for 25 Foot Walk (seconds) 4.33 3.9     Neurologic Exam    Remainder of exam deferred   Imaging:         Results for orders placed during the hospital  encounter of 12/13/23    MRI Brain Demyelinating W W/O Contrast    Impression  Brain appears stable from prior exam, again demonstrating a few T2/FLAIR signal hyperintensities which are not entirely specific but can be seen with reported history of multiple sclerosis.  There are no lesions within the spinal cord.  No new, enhancing, or diffusion restriction lesions to indicate ongoing or active demyelination.    Electronically signed by resident: Grant Jones  Date:    12/13/2023  Time:    16:45    Electronically signed by: Cristiano Alvarez MD  Date:    12/14/2023  Time:    16:21    Results for orders placed during the hospital encounter of 12/13/23    MRI Cervical Spine Demyelinating W W/O Contrast    Impression  Brain appears stable from prior exam, again demonstrating a few T2/FLAIR signal hyperintensities which are not entirely specific but can be seen with reported history of multiple sclerosis.  There are no lesions within the spinal cord.  No new, enhancing, or diffusion restriction lesions to indicate ongoing or active demyelination.    Electronically signed by resident: Grant Jones  Date:    12/13/2023  Time:    16:45    Electronically signed by: Cristiano Alvarez MD  Date:    12/14/2023  Time:    16:21    Results for orders placed during the hospital encounter of 12/13/23    MRI Thoracic Spine Demyelinating W W/O Contrast    Impression  Brain appears stable from prior exam, again demonstrating a few T2/FLAIR signal hyperintensities which are not entirely specific but can be seen with reported history of multiple sclerosis.  There are no lesions within the spinal cord.  No new, enhancing, or diffusion restriction lesions to indicate ongoing or active demyelination.    Electronically signed by resident: Grant Jones  Date:    12/13/2023  Time:    16:45    Electronically signed by: Cristiano Alvarez MD  Date:    12/14/2023  Time:    16:21        Labs:     Lab Results   Component Value Date    DUMFFKAN54YH 35  06/30/2023    FJWKBDPQ01ZW 26 (L) 01/18/2022    KWJIANTK73RL 24 (L) 04/14/2021     Lab Results   Component Value Date    JCVINDEX 0.66 (H) 06/24/2020    JCVANTIBODY POSITIVE (A) 06/24/2020     Lab Results   Component Value Date    NJ0VSLSW 82.3 06/24/2020    ABSOLUTECD3 2529.0 (H) 06/24/2020    LT3LPPEN 15.6 06/24/2020    ABSOLUTECD8 481.0 06/24/2020    HE1YGMUR 66.1 (H) 06/24/2020    ABSOLUTECD4 2031.0 (H) 06/24/2020    LABCD48 4.22 (H) 06/24/2020     Lab Results   Component Value Date    WBC 9.89 11/20/2023    RBC 4.24 11/20/2023    HGB 13.3 11/20/2023    HCT 39.4 11/20/2023    MCV 93 11/20/2023    MCH 31.4 (H) 11/20/2023    MCHC 33.8 11/20/2023    RDW 12.2 11/20/2023     11/20/2023    MPV 12.3 11/20/2023    GRAN 6.2 11/20/2023    GRAN 63.1 11/20/2023    LYMPH 2.8 11/20/2023    LYMPH 28.2 11/20/2023    MONO 0.7 11/20/2023    MONO 6.7 11/20/2023    EOS 0.1 11/20/2023    BASO 0.03 11/20/2023    EOSINOPHIL 1.4 11/20/2023    BASOPHIL 0.3 11/20/2023     Sodium   Date Value Ref Range Status   11/20/2023 139 136 - 145 mmol/L Final     Potassium   Date Value Ref Range Status   11/20/2023 3.7 3.5 - 5.1 mmol/L Final     Chloride   Date Value Ref Range Status   11/20/2023 107 95 - 110 mmol/L Final     CO2   Date Value Ref Range Status   11/20/2023 23 23 - 29 mmol/L Final     Glucose   Date Value Ref Range Status   11/20/2023 96 70 - 110 mg/dL Final     BUN   Date Value Ref Range Status   11/20/2023 9 6 - 20 mg/dL Final     Creatinine   Date Value Ref Range Status   11/20/2023 0.7 0.5 - 1.4 mg/dL Final     Calcium   Date Value Ref Range Status   11/20/2023 9.4 8.7 - 10.5 mg/dL Final     Total Protein   Date Value Ref Range Status   11/20/2023 6.5 6.0 - 8.4 g/dL Final     Albumin   Date Value Ref Range Status   11/20/2023 3.6 3.5 - 5.2 g/dL Final     Total Bilirubin   Date Value Ref Range Status   11/20/2023 0.2 0.1 - 1.0 mg/dL Final     Comment:     For infants and newborns, interpretation of results should be  "based  on gestational age, weight and in agreement with clinical  observations.    Premature Infant recommended reference ranges:  Up to 24 hours.............<8.0 mg/dL  Up to 48 hours............<12.0 mg/dL  3-5 days..................<15.0 mg/dL  6-29 days.................<15.0 mg/dL       Alkaline Phosphatase   Date Value Ref Range Status   11/20/2023 70 55 - 135 U/L Final     AST   Date Value Ref Range Status   11/20/2023 16 10 - 40 U/L Final     ALT   Date Value Ref Range Status   11/20/2023 23 10 - 44 U/L Final     Anion Gap   Date Value Ref Range Status   11/20/2023 9 8 - 16 mmol/L Final     eGFR if    Date Value Ref Range Status   01/18/2022 >60.0 >60 mL/min/1.73 m^2 Final     eGFR if non    Date Value Ref Range Status   01/18/2022 >60.0 >60 mL/min/1.73 m^2 Final     Comment:     Calculation used to obtain the estimated glomerular filtration  rate (eGFR) is the CKD-EPI equation.        Lab Results   Component Value Date    HEPBSAG Non-reactive 11/20/2023    HEPBSAB <3.00 12/23/2022    HEPBSAB Non-reactive 12/23/2022    HEPBCAB Non-reactive 11/20/2023       MS Impression and Plan:     NEURO MULTIPLE SCLEROSIS IMPRESSION:   MS Status:     Clinical Progression:  Improved    MRI Progression:  Stable  Plan:     DMT:  No change in management    DMT comment:  Continue Ocrevus and Vitamin D. Her infusion is due in January. She is aware of the risks associated with immunosuppressant therapy, including increased risk of infection.       Symptom Management:  No change in symptom management (May consider EEG for "space out" episodes. Will discuss with Dr. Becerra.)       Rx for monthly B12 sent to pharmacy.   Reinforced magnesium glycinate for sleep.   Provided information on [facility] for counseling.     Will reach out to MSL from [employer] re: bleeding gums to see if this could be related to Ocrevus. She will also follow up with her dentist about this.     She will follow up with " Dr. Becerra in 4-5 months.     Total time spent with patient: 32 minutes   Total time spent on encounter: 45 minutes         ROBYN Abreu, CNS    Problem List Items Addressed This Visit    None  Visit Diagnoses       B12 deficiency    -  Primary    Relevant Medications    cyanocobalamin 1,000 mcg/mL injection

## 2023-12-28 ENCOUNTER — OFFICE VISIT (OUTPATIENT)
Dept: NEUROLOGY | Facility: CLINIC | Age: 38
End: 2023-12-28
Payer: MEDICAID

## 2023-12-28 VITALS
HEART RATE: 88 BPM | DIASTOLIC BLOOD PRESSURE: 82 MMHG | HEIGHT: 64 IN | BODY MASS INDEX: 31.57 KG/M2 | SYSTOLIC BLOOD PRESSURE: 117 MMHG | WEIGHT: 184.94 LBS

## 2023-12-28 DIAGNOSIS — Z71.89 COUNSELING REGARDING GOALS OF CARE: ICD-10-CM

## 2023-12-28 DIAGNOSIS — G35 MULTIPLE SCLEROSIS: Primary | ICD-10-CM

## 2023-12-28 DIAGNOSIS — Z29.89 PROPHYLACTIC IMMUNOTHERAPY: ICD-10-CM

## 2023-12-28 DIAGNOSIS — Z79.899 HIGH RISK MEDICATION USE: ICD-10-CM

## 2023-12-28 DIAGNOSIS — E53.8 B12 DEFICIENCY: ICD-10-CM

## 2023-12-28 PROCEDURE — 3008F BODY MASS INDEX DOCD: CPT | Mod: CPTII,,, | Performed by: CLINICAL NURSE SPECIALIST

## 2023-12-28 PROCEDURE — 99999 PR PBB SHADOW E&M-EST. PATIENT-LVL III: CPT | Mod: PBBFAC,,, | Performed by: CLINICAL NURSE SPECIALIST

## 2023-12-28 PROCEDURE — 3079F PR MOST RECENT DIASTOLIC BLOOD PRESSURE 80-89 MM HG: ICD-10-PCS | Mod: CPTII,,, | Performed by: CLINICAL NURSE SPECIALIST

## 2023-12-28 PROCEDURE — 3074F SYST BP LT 130 MM HG: CPT | Mod: CPTII,,, | Performed by: CLINICAL NURSE SPECIALIST

## 2023-12-28 PROCEDURE — 99215 PR OFFICE/OUTPT VISIT, EST, LEVL V, 40-54 MIN: ICD-10-PCS | Mod: S$PBB,,, | Performed by: CLINICAL NURSE SPECIALIST

## 2023-12-28 PROCEDURE — 3008F PR BODY MASS INDEX (BMI) DOCUMENTED: ICD-10-PCS | Mod: CPTII,,, | Performed by: CLINICAL NURSE SPECIALIST

## 2023-12-28 PROCEDURE — 3074F PR MOST RECENT SYSTOLIC BLOOD PRESSURE < 130 MM HG: ICD-10-PCS | Mod: CPTII,,, | Performed by: CLINICAL NURSE SPECIALIST

## 2023-12-28 PROCEDURE — 1159F MED LIST DOCD IN RCRD: CPT | Mod: CPTII,,, | Performed by: CLINICAL NURSE SPECIALIST

## 2023-12-28 PROCEDURE — 99213 OFFICE O/P EST LOW 20 MIN: CPT | Mod: PBBFAC | Performed by: CLINICAL NURSE SPECIALIST

## 2023-12-28 PROCEDURE — 99215 OFFICE O/P EST HI 40 MIN: CPT | Mod: S$PBB,,, | Performed by: CLINICAL NURSE SPECIALIST

## 2023-12-28 PROCEDURE — 99999 PR PBB SHADOW E&M-EST. PATIENT-LVL III: ICD-10-PCS | Mod: PBBFAC,,, | Performed by: CLINICAL NURSE SPECIALIST

## 2023-12-28 PROCEDURE — 1159F PR MEDICATION LIST DOCUMENTED IN MEDICAL RECORD: ICD-10-PCS | Mod: CPTII,,, | Performed by: CLINICAL NURSE SPECIALIST

## 2023-12-28 PROCEDURE — 3079F DIAST BP 80-89 MM HG: CPT | Mod: CPTII,,, | Performed by: CLINICAL NURSE SPECIALIST

## 2023-12-28 RX ORDER — CYANOCOBALAMIN 1000 UG/ML
1000 INJECTION, SOLUTION INTRAMUSCULAR; SUBCUTANEOUS
Qty: 6 ML | Refills: 0 | Status: SHIPPED | OUTPATIENT
Start: 2023-12-28 | End: 2024-07-02

## 2023-12-28 NOTE — Clinical Note
She requests the week of 1/15 for her infusion. She can be scheduled--the earlier part of the week is better.

## 2023-12-28 NOTE — Clinical Note
She describes an episode during which she got lost in the parking lot at [facility] and felt disoriented. She also feels like she spaces out during conversations sometimes.Think there could be any rationale for an EEG?

## 2024-01-01 NOTE — TELEPHONE ENCOUNTER
----- Message from Basilio Antunez sent at 9/25/2020  3:29 PM CDT -----  Contact: Laurent ortega/ [employer] @ [phone]  Laurent states auth for OCREVUS was approved, auth# is [id number] and effective 09/25/20-08/24/21    
OK to proceed with Ocrevus infusion  
Yes

## 2024-01-02 ENCOUNTER — TELEPHONE (OUTPATIENT)
Dept: NEUROLOGY | Facility: CLINIC | Age: 39
End: 2024-01-02
Payer: COMMERCIAL

## 2024-01-02 NOTE — TELEPHONE ENCOUNTER
----- Message from ROBYN Dorsey, CNS sent at 12/29/2023  5:19 PM CST -----  She requests the week of 1/15 for her infusion. She can be scheduled--the earlier part of the week is better.

## 2024-01-06 ENCOUNTER — PATIENT MESSAGE (OUTPATIENT)
Dept: NEUROLOGY | Facility: CLINIC | Age: 39
End: 2024-01-06
Payer: COMMERCIAL

## 2024-01-06 ENCOUNTER — TELEPHONE (OUTPATIENT)
Dept: NEUROLOGY | Facility: CLINIC | Age: 39
End: 2024-01-06
Payer: COMMERCIAL

## 2024-01-06 DIAGNOSIS — R41.0 EPISODE OF CONFUSION: Primary | ICD-10-CM

## 2024-01-06 NOTE — TELEPHONE ENCOUNTER
----- Message from Nano Becerra MD sent at 1/3/2024  9:25 AM CST -----  Yes, let's get an EEG, routine first, if we don't capture anything, then maybe a 24hr ambulatory one    ----- Message -----  From: Roseanna Maddox APRN, CNS  Sent: 12/29/2023   5:23 PM CST  To: Nano Becerra MD    She describes an episode during which she got lost in the parking lot at [facility] and felt disoriented. She also feels like she spaces out during conversations sometimes.Think there could be any rationale for an EEG?

## 2024-01-08 NOTE — TELEPHONE ENCOUNTER
LVM telling pt to call us back to schedule an EEG. I will schedule pt and let her know via portal.

## 2024-01-18 ENCOUNTER — INFUSION (OUTPATIENT)
Dept: INFUSION THERAPY | Facility: HOSPITAL | Age: 39
End: 2024-01-18
Payer: MEDICAID

## 2024-01-18 VITALS
DIASTOLIC BLOOD PRESSURE: 76 MMHG | BODY MASS INDEX: 31.24 KG/M2 | RESPIRATION RATE: 18 BRPM | SYSTOLIC BLOOD PRESSURE: 120 MMHG | HEIGHT: 64 IN | WEIGHT: 183 LBS | TEMPERATURE: 98 F | HEART RATE: 77 BPM

## 2024-01-18 DIAGNOSIS — G35 MULTIPLE SCLEROSIS: Primary | ICD-10-CM

## 2024-01-18 PROCEDURE — 96366 THER/PROPH/DIAG IV INF ADDON: CPT

## 2024-01-18 PROCEDURE — 96367 TX/PROPH/DG ADDL SEQ IV INF: CPT

## 2024-01-18 PROCEDURE — 96375 TX/PRO/DX INJ NEW DRUG ADDON: CPT

## 2024-01-18 PROCEDURE — 63600175 PHARM REV CODE 636 W HCPCS: Mod: JZ,JG | Performed by: STUDENT IN AN ORGANIZED HEALTH CARE EDUCATION/TRAINING PROGRAM

## 2024-01-18 PROCEDURE — 96365 THER/PROPH/DIAG IV INF INIT: CPT

## 2024-01-18 PROCEDURE — 25000003 PHARM REV CODE 250: Performed by: STUDENT IN AN ORGANIZED HEALTH CARE EDUCATION/TRAINING PROGRAM

## 2024-01-18 RX ORDER — HEPARIN 100 UNIT/ML
500 SYRINGE INTRAVENOUS
Status: DISCONTINUED | OUTPATIENT
Start: 2024-01-18 | End: 2024-01-18 | Stop reason: HOSPADM

## 2024-01-18 RX ORDER — EPINEPHRINE 0.3 MG/.3ML
0.3 INJECTION SUBCUTANEOUS
OUTPATIENT
Start: 2024-06-20

## 2024-01-18 RX ORDER — FAMOTIDINE 10 MG/ML
20 INJECTION INTRAVENOUS
OUTPATIENT
Start: 2024-06-20

## 2024-01-18 RX ORDER — DIPHENHYDRAMINE HYDROCHLORIDE 50 MG/ML
50 INJECTION INTRAMUSCULAR; INTRAVENOUS
OUTPATIENT
Start: 2024-06-20

## 2024-01-18 RX ORDER — ACETAMINOPHEN 500 MG
1000 TABLET ORAL
OUTPATIENT
Start: 2024-06-20

## 2024-01-18 RX ORDER — HEPARIN 100 UNIT/ML
500 SYRINGE INTRAVENOUS
OUTPATIENT
Start: 2024-06-20

## 2024-01-18 RX ORDER — EPINEPHRINE 0.3 MG/.3ML
0.3 INJECTION SUBCUTANEOUS
Status: DISCONTINUED | OUTPATIENT
Start: 2024-01-18 | End: 2024-01-18 | Stop reason: HOSPADM

## 2024-01-18 RX ORDER — ACETAMINOPHEN 500 MG
1000 TABLET ORAL
Status: COMPLETED | OUTPATIENT
Start: 2024-01-18 | End: 2024-01-18

## 2024-01-18 RX ORDER — SODIUM CHLORIDE 0.9 % (FLUSH) 0.9 %
10 SYRINGE (ML) INJECTION
Status: DISCONTINUED | OUTPATIENT
Start: 2024-01-18 | End: 2024-01-18 | Stop reason: HOSPADM

## 2024-01-18 RX ORDER — FAMOTIDINE 10 MG/ML
20 INJECTION INTRAVENOUS
Status: COMPLETED | OUTPATIENT
Start: 2024-01-18 | End: 2024-01-18

## 2024-01-18 RX ORDER — SODIUM CHLORIDE 0.9 % (FLUSH) 0.9 %
10 SYRINGE (ML) INJECTION
OUTPATIENT
Start: 2024-06-20

## 2024-01-18 RX ORDER — DIPHENHYDRAMINE HYDROCHLORIDE 50 MG/ML
50 INJECTION INTRAMUSCULAR; INTRAVENOUS
Status: DISCONTINUED | OUTPATIENT
Start: 2024-01-18 | End: 2024-01-18 | Stop reason: HOSPADM

## 2024-01-18 RX ADMIN — SODIUM CHLORIDE: 9 INJECTION, SOLUTION INTRAVENOUS at 10:01

## 2024-01-18 RX ADMIN — DIPHENHYDRAMINE HYDROCHLORIDE 50 MG: 50 INJECTION, SOLUTION INTRAMUSCULAR; INTRAVENOUS at 10:01

## 2024-01-18 RX ADMIN — OCRELIZUMAB 600 MG: 300 INJECTION INTRAVENOUS at 12:01

## 2024-01-18 RX ADMIN — ACETAMINOPHEN 1000 MG: 500 TABLET ORAL at 10:01

## 2024-01-18 RX ADMIN — DEXTROSE: 50 INJECTION, SOLUTION INTRAVENOUS at 11:01

## 2024-01-18 RX ADMIN — FAMOTIDINE 20 MG: 10 INJECTION, SOLUTION INTRAVENOUS at 10:01

## 2024-01-18 NOTE — PLAN OF CARE
1030 pt here for Ocrevus Maintenance, hx, meds, allergies reviewed, pt with no new complaints at this time, reclined in chair, continue to monitor

## 2024-01-18 NOTE — PLAN OF CARE
1600 pt tolerated Ocrevus infusion without issue, pt has no upcoming appts at this time, no distress noted upon d/c to home with

## 2024-01-19 ENCOUNTER — TELEPHONE (OUTPATIENT)
Dept: NEUROLOGY | Facility: CLINIC | Age: 39
End: 2024-01-19

## 2024-01-22 ENCOUNTER — PATIENT MESSAGE (OUTPATIENT)
Dept: PSYCHIATRY | Facility: CLINIC | Age: 39
End: 2024-01-22
Payer: COMMERCIAL

## 2024-02-02 ENCOUNTER — OFFICE VISIT (OUTPATIENT)
Dept: PRIMARY CARE CLINIC | Facility: CLINIC | Age: 39
End: 2024-02-02
Payer: MEDICAID

## 2024-02-02 VITALS
HEART RATE: 83 BPM | BODY MASS INDEX: 30.21 KG/M2 | TEMPERATURE: 98 F | SYSTOLIC BLOOD PRESSURE: 131 MMHG | DIASTOLIC BLOOD PRESSURE: 79 MMHG | OXYGEN SATURATION: 95 % | HEIGHT: 65 IN | WEIGHT: 181.31 LBS

## 2024-02-02 DIAGNOSIS — R06.00 DYSPNEA, UNSPECIFIED TYPE: ICD-10-CM

## 2024-02-02 DIAGNOSIS — Z72.0 TOBACCO USE: ICD-10-CM

## 2024-02-02 DIAGNOSIS — R06.83 SNORING: ICD-10-CM

## 2024-02-02 DIAGNOSIS — G47.33 OSA (OBSTRUCTIVE SLEEP APNEA): Primary | ICD-10-CM

## 2024-02-02 PROCEDURE — 3008F BODY MASS INDEX DOCD: CPT | Mod: CPTII,,, | Performed by: FAMILY MEDICINE

## 2024-02-02 PROCEDURE — 99999 PR PBB SHADOW E&M-EST. PATIENT-LVL IV: CPT | Mod: PBBFAC,,, | Performed by: FAMILY MEDICINE

## 2024-02-02 PROCEDURE — 99214 OFFICE O/P EST MOD 30 MIN: CPT | Mod: PBBFAC,PN | Performed by: FAMILY MEDICINE

## 2024-02-02 PROCEDURE — 1159F MED LIST DOCD IN RCRD: CPT | Mod: CPTII,,, | Performed by: FAMILY MEDICINE

## 2024-02-02 PROCEDURE — 3075F SYST BP GE 130 - 139MM HG: CPT | Mod: CPTII,,, | Performed by: FAMILY MEDICINE

## 2024-02-02 PROCEDURE — 3078F DIAST BP <80 MM HG: CPT | Mod: CPTII,,, | Performed by: FAMILY MEDICINE

## 2024-02-02 PROCEDURE — 99214 OFFICE O/P EST MOD 30 MIN: CPT | Mod: S$PBB,,, | Performed by: FAMILY MEDICINE

## 2024-02-02 RX ORDER — ALBUTEROL SULFATE 90 UG/1
2 AEROSOL, METERED RESPIRATORY (INHALATION) EVERY 6 HOURS PRN
Qty: 18 G | Refills: 2 | Status: SHIPPED | OUTPATIENT
Start: 2024-02-02 | End: 2025-02-01

## 2024-02-02 RX ORDER — FLUTICASONE PROPIONATE 50 MCG
2 SPRAY, SUSPENSION (ML) NASAL 2 TIMES DAILY
Qty: 16 G | Refills: 2 | Status: SHIPPED | OUTPATIENT
Start: 2024-02-02 | End: 2024-04-03 | Stop reason: SDUPTHER

## 2024-02-02 NOTE — LETTER
February 2, 2024    Cari Barillas  [address] [place] [address]             [facility]  [address]  [address] [address]  Phone: [phone]  Fax: [phone] To whom this may concern,    Ms. Cari Barillas was seen at [facility] on 2/2/24. Please excuse her from work on Friday 2/2/24 and Saturday 2/3/24.    If you have any questions or concerns, please don't hesitate to call.    Sincerely,          Gurwinder Michael MD

## 2024-02-02 NOTE — PROGRESS NOTES
"  Clinic Note  2/2/2024      Subjective:       Patient ID:  Cari is a 38 y.o. female being seen for an established visit.    Chief Complaint: Annual Exam    URI -reports 5 days of nasal congestion, sinus pressure, headaches, cough, sore throat. OTC medications including mucinex-DM has been helpful.    Dyspnea -  Does have some baseline dyspnea at night over the last several months, reports having diagnosis of JUNIOR w/o treatment. Smokes about 5-10 cigs per day. Denies hx of asthma or wheezing, but reports hearing "crackles" sometimes. Denies orthopnea, lower extremity swelling.     Review of Systems   HENT:  Positive for congestion.    Respiratory:  Positive for cough and shortness of breath. Negative for hemoptysis, sputum production and wheezing.    Neurological:  Positive for headaches.       Medication List with Changes/Refills   Current Medications    BUPROPION (WELLBUTRIN XL) 300 MG 24 HR TABLET    Take 1 tablet (300 mg total) by mouth every morning.    CHOLECALCIFEROL, VITAMIN D3, 1,250 MCG (50,000 UNIT) CAPSULE    Take 1 capsule (50,000 Units total) by mouth once a week.    CYANOCOBALAMIN 1,000 MCG/ML INJECTION    Inject 1 mL (1,000 mcg total) into the muscle every 30 days.    FLUTICASONE PROPIONATE (FLONASE) 50 MCG/ACTUATION NASAL SPRAY    1 spray by Each Nostril route daily as needed.    GABAPENTIN (NEURONTIN) 300 MG CAPSULE    Take 300 mg by mouth 3 (three) times daily.    LORAZEPAM (ATIVAN) 1 MG TABLET    Take 1 tablet every day by oral route as needed for 30 days.    MAGNESIUM 250 MG TAB    Take 250 mg by mouth once daily.    METHOCARBAMOL (ROBAXIN) 500 MG TAB    Take 1 tablet (500 mg total) by mouth 4 (four) times daily as needed (muscle pain (TMJ)).    METHYLPHENIDATE HCL (CONCERTA) 18 MG CR TABLET    Take 1 tablet every day by oral route.    METHYLPHENIDATE HCL (RITALIN) 10 MG TABLET    Take 1 tablet every day by oral route in the morning.    OCRELIZUMAB (OCREVUS IV)    Inject into the vein.    " "WELLBUTRIN  MG 24 HR TABLET    Take 300 mg by mouth every morning.       Patient Active Problem List   Diagnosis    Paresthesia of left upper extremity    Chronic alcohol use    Multiple sclerosis    Immunosuppression due to drug therapy    Chronic fatigue    Anxiety    Neuropathic pain    Vitamin D insufficiency    JUNIOR (obstructive sleep apnea)    Nausea & vomiting    Diarrhea           Objective:      /79 (BP Location: Left arm, Patient Position: Sitting, BP Method: Large (Automatic))   Pulse 83   Temp 98.4 °F (36.9 °C)   Ht 5' 5" (1.651 m)   Wt 82.2 kg (181 lb 5.3 oz)   LMP 01/12/2024 (Approximate)   SpO2 95%   BMI 30.17 kg/m²   Estimated body mass index is 30.17 kg/m² as calculated from the following:    Height as of this encounter: 5' 5" (1.651 m).    Weight as of this encounter: 82.2 kg (181 lb 5.3 oz).  Physical Exam  Vitals reviewed.   Constitutional:       General: She is not in acute distress.     Appearance: She is not diaphoretic.   HENT:      Head: Normocephalic and atraumatic.   Eyes:      Conjunctiva/sclera: Conjunctivae normal.   Cardiovascular:      Rate and Rhythm: Normal rate and regular rhythm.      Heart sounds: Normal heart sounds.   Pulmonary:      Effort: Pulmonary effort is normal. No respiratory distress.      Breath sounds: Normal breath sounds. No wheezing.   Abdominal:      General: Bowel sounds are normal.      Palpations: Abdomen is soft.   Musculoskeletal:         General: Normal range of motion.      Cervical back: Normal range of motion.   Skin:     General: Skin is warm and dry.      Findings: No erythema or rash.   Neurological:      Mental Status: She is alert and oriented to person, place, and time.   Psychiatric:         Mood and Affect: Mood and affect normal.         Behavior: Behavior normal.         Thought Content: Thought content normal.         Judgment: Judgment normal.           Assessment and Plan:     1. JUNIOR (obstructive sleep apnea)  - px with hx " of JUNIOR, will reevaluate and treat with CPAP if still present  - Home Sleep Study; Future    2. Snoring  - Home Sleep Study; Future    3. Dyspnea, unspecified type  - unclear etiology, but could be due to untreated JUNIOR or tobacco use with associated RAD. Trial of albuterol inhaler  - X-Ray Chest PA And Lateral; Future  - albuterol (VENTOLIN HFA) 90 mcg/actuation inhaler; Inhale 2 puffs into the lungs every 6 (six) hours as needed for Wheezing. Rescue  Dispense: 18 g; Refill: 2    4. Tobacco use  - pt wants to quit, will send to smoking cessation  - Ambulatory referral/consult to Smoking Cessation Program; Future      Follow Up:   No follow-ups on file.    Other Orders Placed This Visit:  No orders of the defined types were placed in this encounter.        Gurwinder Michael MD        This note is dictated on M*Modal word recognition program.  There are word recognition mistakes that are occasionally missed on review.

## 2024-02-27 ENCOUNTER — TELEPHONE (OUTPATIENT)
Dept: SMOKING CESSATION | Facility: CLINIC | Age: 39
End: 2024-02-27
Payer: COMMERCIAL

## 2024-02-27 NOTE — TELEPHONE ENCOUNTER
Called patient to follow-up on missed appointment. Left voicemail with CTTS contact information for patient to return call and reschedule.

## 2024-02-28 ENCOUNTER — TELEPHONE (OUTPATIENT)
Dept: SLEEP MEDICINE | Facility: OTHER | Age: 39
End: 2024-02-28
Payer: COMMERCIAL

## 2024-03-11 ENCOUNTER — TELEPHONE (OUTPATIENT)
Dept: SLEEP MEDICINE | Facility: OTHER | Age: 39
End: 2024-03-11
Payer: COMMERCIAL

## 2024-03-21 ENCOUNTER — HOSPITAL ENCOUNTER (OUTPATIENT)
Dept: NEUROLOGY | Facility: CLINIC | Age: 39
Discharge: HOME | End: 2024-03-21
Payer: COMMERCIAL

## 2024-03-21 DIAGNOSIS — R41.0 EPISODE OF CONFUSION: ICD-10-CM

## 2024-03-21 PROCEDURE — 95819 EEG AWAKE AND ASLEEP: CPT | Mod: PBBFAC | Performed by: PSYCHIATRY & NEUROLOGY

## 2024-03-21 PROCEDURE — 95819 EEG AWAKE AND ASLEEP: CPT | Mod: 26,S$PBB,, | Performed by: PSYCHIATRY & NEUROLOGY

## 2024-04-03 ENCOUNTER — OFFICE VISIT (OUTPATIENT)
Dept: PRIMARY CARE CLINIC | Facility: CLINIC | Age: 39
End: 2024-04-03
Payer: MEDICAID

## 2024-04-03 DIAGNOSIS — R06.83 SNORING: ICD-10-CM

## 2024-04-03 DIAGNOSIS — R05.9 COUGH, UNSPECIFIED TYPE: Primary | ICD-10-CM

## 2024-04-03 DIAGNOSIS — Z72.0 TOBACCO USE: ICD-10-CM

## 2024-04-03 DIAGNOSIS — G47.33 OSA (OBSTRUCTIVE SLEEP APNEA): ICD-10-CM

## 2024-04-03 PROCEDURE — 99213 OFFICE O/P EST LOW 20 MIN: CPT | Mod: 95,,, | Performed by: FAMILY MEDICINE

## 2024-04-03 RX ORDER — PROMETHAZINE HYDROCHLORIDE AND DEXTROMETHORPHAN HYDROBROMIDE 6.25; 15 MG/5ML; MG/5ML
5 SYRUP ORAL EVERY 4 HOURS PRN
Qty: 118 ML | Refills: 0 | Status: SHIPPED | OUTPATIENT
Start: 2024-04-03

## 2024-04-03 RX ORDER — FLUTICASONE PROPIONATE 50 MCG
2 SPRAY, SUSPENSION (ML) NASAL 2 TIMES DAILY
Qty: 16 G | Refills: 2 | Status: SHIPPED | OUTPATIENT
Start: 2024-04-03

## 2024-04-03 NOTE — PROGRESS NOTES
The patient location is: [place]  The chief complaint leading to consultation is: cough    Visit type: audiovisual    Face to Face time with patient: 15 minutes of total time spent on the encounter, which includes face to face time and non-face to face time preparing to see the patient (eg, review of tests), Obtaining and/or reviewing separately obtained history, Documenting clinical information in the electronic or other health record, Independently interpreting results (not separately reported) and communicating results to the patient/family/caregiver, or Care coordination (not separately reported).         Each patient to whom he or she provides medical services by telemedicine is:  (1) informed of the relationship between the physician and patient and the respective role of any other health care provider with respect to management of the patient; and (2) notified that he or she may decline to receive medical services by telemedicine and may withdraw from such care at any time.    Notes:   3      Clinic Note  4/3/2024      Subjective:       Patient ID:  Cari is a 38 y.o. female being seen for an established visit.    Chief Complaint:     Cough-patient with history of tobacco use, half pack per day for about 20 years.  Two months ago patient was seen in clinic for URI and chronic cough and dyspnea, had unremarkable chest x-ray.  Patient reports that over last several weeks her cough has come back again with postnasal drip aggravating her cough.  Still smoking about 5-10 cigarettes per day.  Denies any chest pain, fever, chills, wheezing, no new dyspnea, body aches.    Tongue soreness-patient reports over last few weeks has noticed soreness of the tongue, feels as though she has been talking constantly.  No other symptoms, sore throat, dysphagia.        Review of Systems   Constitutional:  Negative for chills, fever, malaise/fatigue and weight loss.   HENT:  Negative for congestion, ear discharge, nosebleeds, sinus  pain and sore throat.    Respiratory:  Positive for cough. Negative for hemoptysis, sputum production, shortness of breath, wheezing and stridor.    Cardiovascular:  Negative for chest pain and palpitations.   Gastrointestinal:  Negative for constipation, diarrhea, nausea and vomiting.   Genitourinary:  Negative for dysuria, frequency and urgency.   Musculoskeletal:  Negative for myalgias.   Skin:  Negative for rash.   Neurological:  Negative for headaches.       Medication List with Changes/Refills   New Medications    PROMETHAZINE-DEXTROMETHORPHAN (PROMETHAZINE-DM) 6.25-15 MG/5 ML SYRP    Take 5 mLs by mouth every 4 (four) hours as needed (cough).   Current Medications    ALBUTEROL (VENTOLIN HFA) 90 MCG/ACTUATION INHALER    Inhale 2 puffs into the lungs every 6 (six) hours as needed for Wheezing. Rescue    BUPROPION (WELLBUTRIN XL) 300 MG 24 HR TABLET    Take 1 tablet (300 mg total) by mouth every morning.    BUPROPION (WELLBUTRIN XL) 300 MG 24 HR TABLET    TAKE 1 TABLET BY MOUTH EVERY MORNING    CHOLECALCIFEROL, VITAMIN D3, 1,250 MCG (50,000 UNIT) CAPSULE    Take 1 capsule (50,000 Units total) by mouth once a week.    CYANOCOBALAMIN 1,000 MCG/ML INJECTION    Inject 1 mL (1,000 mcg total) into the muscle every 30 days.    GABAPENTIN (NEURONTIN) 300 MG CAPSULE    Take 300 mg by mouth 3 (three) times daily.    LORAZEPAM (ATIVAN) 1 MG TABLET    Take 1 tablet by mouth every day as needed    MAGNESIUM 250 MG TAB    Take 250 mg by mouth once daily.    METHOCARBAMOL (ROBAXIN) 500 MG TAB    Take 1 tablet (500 mg total) by mouth 4 (four) times daily as needed (muscle pain (TMJ)).    METHYLPHENIDATE HCL (CONCERTA) 18 MG CR TABLET    Take 1 tablet every day by oral route.    METHYLPHENIDATE HCL (RITALIN) 10 MG TABLET    Take 1 tablet every day by oral route in the morning.    METHYLPHENIDATE HCL (RITALIN) 10 MG TABLET    Take 1 tablet every day by oral route in the morning.    METHYLPHENIDATE HCL (RITALIN) 10 MG TABLET     "Take 1 tablet by mouth every day in the morning.    METHYLPHENIDATE HCL (RITALIN) 10 MG TABLET    Take 1 tablet by mouth every day in the morning.    OCRELIZUMAB (OCREVUS IV)    Inject into the vein.    WELLBUTRIN  MG 24 HR TABLET    Take 300 mg by mouth every morning.   Changed and/or Refilled Medications    Modified Medication Previous Medication    FLUTICASONE PROPIONATE (FLONASE) 50 MCG/ACTUATION NASAL SPRAY fluticasone propionate (FLONASE) 50 mcg/actuation nasal spray       2 sprays (100 mcg total) by Each Nostril route 2 (two) times a day.    2 sprays (100 mcg total) by Each Nostril route 2 (two) times a day.       Patient Active Problem List   Diagnosis    Paresthesia of left upper extremity    Chronic alcohol use    Multiple sclerosis    Immunosuppression due to drug therapy    Chronic fatigue    Anxiety    Neuropathic pain    Vitamin D insufficiency    JUNIOR (obstructive sleep apnea)    Nausea & vomiting    Diarrhea           Objective:      There were no vitals taken for this visit.  Estimated body mass index is 30.17 kg/m² as calculated from the following:    Height as of 2/2/24: 5' 5" (1.651 m).    Weight as of 2/2/24: 82.2 kg (181 lb 5.3 oz).  Physical Exam  Constitutional:       General: She is not in acute distress.     Appearance: She is not diaphoretic.   HENT:      Head: Normocephalic and atraumatic.   Eyes:      Conjunctiva/sclera: Conjunctivae normal.   Pulmonary:      Effort: Pulmonary effort is normal.   Musculoskeletal:         General: Normal range of motion.   Neurological:      Mental Status: She is alert and oriented to person, place, and time.   Psychiatric:         Mood and Affect: Mood and affect normal.         Behavior: Behavior normal.         Thought Content: Thought content normal.         Cognition and Memory: Memory normal.         Judgment: Judgment normal.           Assessment and Plan:     1. Cough, unspecified type  - patient with subacute cough over the last several " weeks, differential includes allergic rhinitis, RAD.  Will treat postnasal drip with Flonase, will give promethazine-DM, will do a trial of albuterol inhaler p.r.n..  Discussed importance of tobacco cessation  - fluticasone propionate (FLONASE) 50 mcg/actuation nasal spray; 2 sprays (100 mcg total) by Each Nostril route 2 (two) times a day.  Dispense: 16 g; Refill: 2  - promethazine-dextromethorphan (PROMETHAZINE-DM) 6.25-15 mg/5 mL Syrp; Take 5 mLs by mouth every 4 (four) hours as needed (cough).  Dispense: 118 mL; Refill: 0    2. JUNIOR (obstructive sleep apnea)  - Home Sleep Study; Future    3. Snoring  - Home Sleep Study; Future    4. Tobacco use  - being followed by smoking cessation, patient motivated to quit smoking due to health        Follow Up:   No follow-ups on file.    Other Orders Placed This Visit:  Orders Placed This Encounter   Procedures    Home Sleep Study         Gurwinder Michael MD        This note is dictated on M*Modal word recognition program.  There are word recognition mistakes that are occasionally missed on review.

## 2024-04-04 ENCOUNTER — PATIENT MESSAGE (OUTPATIENT)
Dept: NEUROLOGY | Facility: CLINIC | Age: 39
End: 2024-04-04
Payer: COMMERCIAL

## 2024-04-22 ENCOUNTER — TELEPHONE (OUTPATIENT)
Dept: SLEEP MEDICINE | Facility: OTHER | Age: 39
End: 2024-04-22
Payer: COMMERCIAL

## 2024-04-23 ENCOUNTER — TELEPHONE (OUTPATIENT)
Dept: SLEEP MEDICINE | Facility: OTHER | Age: 39
End: 2024-04-23
Payer: COMMERCIAL

## 2024-04-26 ENCOUNTER — TELEPHONE (OUTPATIENT)
Dept: SLEEP MEDICINE | Facility: OTHER | Age: 39
End: 2024-04-26
Payer: COMMERCIAL

## 2024-04-29 ENCOUNTER — HOSPITAL ENCOUNTER (OUTPATIENT)
Dept: SLEEP MEDICINE | Facility: OTHER | Age: 39
Discharge: HOME | End: 2024-04-29
Attending: FAMILY MEDICINE
Payer: COMMERCIAL

## 2024-04-29 DIAGNOSIS — G47.33 OSA (OBSTRUCTIVE SLEEP APNEA): ICD-10-CM

## 2024-04-29 DIAGNOSIS — R06.83 SNORING: ICD-10-CM

## 2024-04-29 PROCEDURE — 95800 SLP STDY UNATTENDED: CPT

## 2024-05-01 ENCOUNTER — TELEPHONE (OUTPATIENT)
Dept: SLEEP MEDICINE | Facility: OTHER | Age: 39
End: 2024-05-01
Payer: COMMERCIAL

## 2024-05-02 ENCOUNTER — PATIENT MESSAGE (OUTPATIENT)
Dept: PSYCHIATRY | Facility: CLINIC | Age: 39
End: 2024-05-02
Payer: COMMERCIAL

## 2024-05-07 ENCOUNTER — E-VISIT (OUTPATIENT)
Dept: FAMILY MEDICINE | Facility: CLINIC | Age: 39
End: 2024-05-07
Payer: MEDICAID

## 2024-05-07 ENCOUNTER — TELEPHONE (OUTPATIENT)
Dept: SLEEP MEDICINE | Facility: OTHER | Age: 39
End: 2024-05-07
Payer: COMMERCIAL

## 2024-05-07 DIAGNOSIS — R21 RASH: Primary | ICD-10-CM

## 2024-05-07 DIAGNOSIS — L73.9 FOLLICULITIS: ICD-10-CM

## 2024-05-07 PROCEDURE — 99421 OL DIG E/M SVC 5-10 MIN: CPT | Mod: ,,, | Performed by: STUDENT IN AN ORGANIZED HEALTH CARE EDUCATION/TRAINING PROGRAM

## 2024-05-07 RX ORDER — MUPIROCIN 20 MG/G
OINTMENT TOPICAL 3 TIMES DAILY
Qty: 22 G | Refills: 1 | Status: SHIPPED | OUTPATIENT
Start: 2024-05-07 | End: 2024-06-07

## 2024-05-07 RX ORDER — DOXYCYCLINE HYCLATE 100 MG
100 TABLET ORAL 2 TIMES DAILY
Qty: 20 TABLET | Refills: 0 | Status: SHIPPED | OUTPATIENT
Start: 2024-05-07 | End: 2024-05-18

## 2024-05-07 NOTE — TELEPHONE ENCOUNTER
Left messages to bring back the home sleep study device she picked up on April 29th.  No response.  I sent out a message through my chart that we need the device for the results and for other patients to use.

## 2024-05-07 NOTE — PROGRESS NOTES
Patient ID: Cari Barillas is a 38 y.o. female.    Chief Complaint: Rash (Entered automatically based on patient selection in Patient Portal.)          274}  The patient initiated a request through [employer] on 5/7/2024 for evaluation and management with a chief complaint of Rash (Entered automatically based on patient selection in Patient Portal.)     I evaluated the questionnaire submission on 05/07/2024 .    Total Time (in minutes): 6     Ohs Peq Evisit Rash    5/7/2024 12:48 PM CDT - Filed by Patient   Do you agree to participate in an E-Visit? Yes   If you have any of the following symptoms, please present to your local ER or call 911:  I acknowledge   What is the main issue you would like addressed today? Bump on pubic area has gotten worse   Are you able to take your vital signs? No   Are you pregnant, could you be pregnant, or are you breast feeding? None of the above   How would you describe your skin problem? Lump or bump   When did your symptoms first appear? 5/3/2024   Where is it located?  Groin;  Clothes covered areas   Does it itch? No   Does it hurt? No   Is there discharge or drainage? No   Is there bleeding? No   Describe the character Raised;  Solid or firm   Describe the color Red   Has it changed over time? Grown in size   Frequency of skin problem Always there   Duration of the skin problem (how long does it stay when it is present) Days   I have had a new exposure to No new exposures   I have had a new exposure to No new exposures   What have you used to treat the skin problem? Nothing   If you have used anything for treatment, has it helped the symptoms? No   Other generalized symptoms that you associate with the rash No other symptoms   Provide any additional information you feel is important. No pain or itching   At least one photo is required for treatment to be provided. You can upload a maximum of three photos of the affected area.            Active Problem List with Overview Notes     Diagnosis Date Noted    Nausea & vomiting 10/03/2022    Diarrhea 10/03/2022    JUNIOR (obstructive sleep apnea)     Anxiety 10/14/2020    Neuropathic pain 10/14/2020    Vitamin D insufficiency 10/14/2020    Chronic fatigue 08/18/2020    Immunosuppression due to drug therapy 08/10/2020    Multiple sclerosis 06/26/2020     Patient on Ocrevus and stable      Paresthesia of left upper extremity 06/11/2020    Chronic alcohol use 06/11/2020      Recent Labs Obtained:  Lab Results   Component Value Date    WBC 9.89 11/20/2023    HGB 13.3 11/20/2023    HCT 39.4 11/20/2023    MCV 93 11/20/2023     11/20/2023     11/20/2023    K 3.7 11/20/2023    GLU 96 11/20/2023    CREATININE 0.7 11/20/2023    EGFRNORACEVR >60.0 11/20/2023    HGBA1C 5.1 06/11/2020      Review of patient's allergies indicates:   Allergen Reactions    Gluten protein     Soy        Encounter Diagnoses   Name Primary?    Rash Yes    Folliculitis         No orders of the defined types were placed in this encounter.     Medications Ordered This Encounter   Medications    doxycycline (VIBRA-TABS) 100 MG tablet     Sig: Take 1 tablet (100 mg total) by mouth 2 (two) times daily. for 10 days     Dispense:  20 tablet     Refill:  0    mupirocin (BACTROBAN) 2 % ointment     Sig: Apply topically 3 (three) times daily. for 7 days     Dispense:  22 g     Refill:  1        E-Visit Time Tracking:    Day 1 Time (in minutes): 6    Total Time (in minutes): 6      274}

## 2024-05-08 ENCOUNTER — LAB VISIT (OUTPATIENT)
Dept: LAB | Facility: HOSPITAL | Age: 39
End: 2024-05-08
Payer: COMMERCIAL

## 2024-05-08 ENCOUNTER — OFFICE VISIT (OUTPATIENT)
Dept: NEUROLOGY | Facility: CLINIC | Age: 39
End: 2024-05-08
Payer: COMMERCIAL

## 2024-05-08 VITALS
SYSTOLIC BLOOD PRESSURE: 117 MMHG | HEART RATE: 88 BPM | WEIGHT: 181 LBS | DIASTOLIC BLOOD PRESSURE: 84 MMHG | BODY MASS INDEX: 30.16 KG/M2 | HEIGHT: 65 IN

## 2024-05-08 DIAGNOSIS — G35 MULTIPLE SCLEROSIS: ICD-10-CM

## 2024-05-08 DIAGNOSIS — G35 MULTIPLE SCLEROSIS: Primary | ICD-10-CM

## 2024-05-08 LAB
ALBUMIN SERPL BCP-MCNC: 3.8 G/DL (ref 3.5–5.2)
ALP SERPL-CCNC: 86 U/L (ref 55–135)
ALT SERPL W/O P-5'-P-CCNC: 16 U/L (ref 10–44)
ANION GAP SERPL CALC-SCNC: 9 MMOL/L (ref 8–16)
AST SERPL-CCNC: 15 U/L (ref 10–40)
BASOPHILS # BLD AUTO: 0.04 K/UL (ref 0–0.2)
BASOPHILS NFR BLD: 0.4 % (ref 0–1.9)
BILIRUB SERPL-MCNC: 0.4 MG/DL (ref 0.1–1)
BUN SERPL-MCNC: 9 MG/DL (ref 6–20)
CALCIUM SERPL-MCNC: 9.6 MG/DL (ref 8.7–10.5)
CHLORIDE SERPL-SCNC: 103 MMOL/L (ref 95–110)
CO2 SERPL-SCNC: 24 MMOL/L (ref 23–29)
CREAT SERPL-MCNC: 0.7 MG/DL (ref 0.5–1.4)
DIFFERENTIAL METHOD BLD: ABNORMAL
EOSINOPHIL # BLD AUTO: 0.1 K/UL (ref 0–0.5)
EOSINOPHIL NFR BLD: 1.2 % (ref 0–8)
ERYTHROCYTE [DISTWIDTH] IN BLOOD BY AUTOMATED COUNT: 13 % (ref 11.5–14.5)
EST. GFR  (NO RACE VARIABLE): >60 ML/MIN/1.73 M^2
GLUCOSE SERPL-MCNC: 96 MG/DL (ref 70–110)
HBV CORE AB SERPL QL IA: NORMAL
HBV SURFACE AG SERPL QL IA: NORMAL
HCT VFR BLD AUTO: 41.6 % (ref 37–48.5)
HGB BLD-MCNC: 14 G/DL (ref 12–16)
IGA SERPL-MCNC: 115 MG/DL (ref 40–350)
IGG SERPL-MCNC: 624 MG/DL (ref 650–1600)
IGM SERPL-MCNC: 60 MG/DL (ref 50–300)
IMM GRANULOCYTES # BLD AUTO: 0.03 K/UL (ref 0–0.04)
IMM GRANULOCYTES NFR BLD AUTO: 0.3 % (ref 0–0.5)
LYMPHOCYTES # BLD AUTO: 2.1 K/UL (ref 1–4.8)
LYMPHOCYTES NFR BLD: 19.5 % (ref 18–48)
MCH RBC QN AUTO: 31.3 PG (ref 27–31)
MCHC RBC AUTO-ENTMCNC: 33.7 G/DL (ref 32–36)
MCV RBC AUTO: 93 FL (ref 82–98)
MONOCYTES # BLD AUTO: 0.8 K/UL (ref 0.3–1)
MONOCYTES NFR BLD: 7.2 % (ref 4–15)
NEUTROPHILS # BLD AUTO: 7.6 K/UL (ref 1.8–7.7)
NEUTROPHILS NFR BLD: 71.4 % (ref 38–73)
NRBC BLD-RTO: 0 /100 WBC
PLATELET # BLD AUTO: 289 K/UL (ref 150–450)
PMV BLD AUTO: 11.4 FL (ref 9.2–12.9)
POTASSIUM SERPL-SCNC: 3.9 MMOL/L (ref 3.5–5.1)
PROT SERPL-MCNC: 7 G/DL (ref 6–8.4)
RBC # BLD AUTO: 4.48 M/UL (ref 4–5.4)
SODIUM SERPL-SCNC: 136 MMOL/L (ref 136–145)
VIT B12 SERPL-MCNC: 450 PG/ML (ref 210–950)
WBC # BLD AUTO: 10.7 K/UL (ref 3.9–12.7)

## 2024-05-08 PROCEDURE — 80053 COMPREHEN METABOLIC PANEL: CPT

## 2024-05-08 PROCEDURE — 82784 ASSAY IGA/IGD/IGG/IGM EACH: CPT | Mod: 59

## 2024-05-08 PROCEDURE — 82607 VITAMIN B-12: CPT

## 2024-05-08 PROCEDURE — 99999 PR PBB SHADOW E&M-EST. PATIENT-LVL IV: CPT | Mod: PBBFAC,,, | Performed by: STUDENT IN AN ORGANIZED HEALTH CARE EDUCATION/TRAINING PROGRAM

## 2024-05-08 PROCEDURE — 36415 COLL VENOUS BLD VENIPUNCTURE: CPT

## 2024-05-08 PROCEDURE — 99214 OFFICE O/P EST MOD 30 MIN: CPT | Mod: PBBFAC | Performed by: STUDENT IN AN ORGANIZED HEALTH CARE EDUCATION/TRAINING PROGRAM

## 2024-05-08 PROCEDURE — 85025 COMPLETE CBC W/AUTO DIFF WBC: CPT

## 2024-05-08 PROCEDURE — 86704 HEP B CORE ANTIBODY TOTAL: CPT

## 2024-05-08 PROCEDURE — 87340 HEPATITIS B SURFACE AG IA: CPT

## 2024-05-08 NOTE — PROGRESS NOTES
"[facility]  Follow Up Patient Visit      Disease Summary     Principle neurological diagnosis: RR MS    Date of symptom onset: 6/2020  Date of diagnosis: 6/2020  Disease type at diagnosis: RR  Disease type currently: RR  Previous therapy: IVMP, Copaxone (7/2020 - 9/2020, breakthrough lesion)  Current therapy: Ocrevus 10/2020 - present  Last MRI Brain: 12/13/23 stable  Last MRI C-spine: 12/13/23 stable  Last MRI T-spine:  12/13/23 stable  CSF: 2020 13 OCBs, IgG index 1.52  JCV status: 2020 0.66 index  Other relevant labs and tests: Vit D 35 (6/30/23)    Lab Results   Component Value Date    JCVINDEX 0.66 (H) 06/24/2020    JCVANTIBODY POSITIVE (A) 06/24/2020     Vit D:   Lab Results   Component Value Date    ZZLTTCJA33RN 35 06/30/2023    KXHOOIOW60RF 26 (L) 01/18/2022    ZOJGKZLU57IH 24 (L) 04/14/2021       Interval history:   Last MS clinic visit was with Roseanna Maddox on 12/29/23. Had started taking B12 injections due to deficiency: felt better with this, less eye twitching. Had noticed bleeding of gums for ~2-3 weeks. Also reported episodes of confusion, got lost in [facility] parking garage. Plan at the time was to continue Ocrevus and vitamin D, perhaps consider EEG.     Has still had bleeding from gums, had difficulty scheduling dentist appointment with her insurance. Bleeding has stayed the same. Noticed within the last year. Brushes teeth regularly. Describes episode of getting lost in the parking lot and was crying (lasted several minutes); was in car w/boyfriend, and wasn't able to understand to what he was saying (lasted several seconds). These two episodes happened within the same week. No further episodes of "spacing out" since last clinic visit. No personal or family history of seizure. Says that EEG was done, but results aren't available in EMR.    Still having difficulty with both sleep initiation and maintenance. Has difficulty sleeping without alcohol or ativan. Will need " "multiple alcoholic drinks to fall asleep, especially after working. Having some LE spasms, L >> R. Not taking gabapentin anymore due to zoning out with it. Takes Robaxin occasionally, which helps. Last Ocrevus infusion was in January 2024. Has folliculitis in groin area, prescribed doxycycline yesterday. Has UTIs 2-3x per year, frequency stable.    Sometimes has gait difficulty/imbalance, feels "like is on a boat." Some worse short-term memory. Says she is "always tired" but this is stable. Denies weakness, sensory changes, dysphagia, dysarthria, spasticity, visual changes, cognitive changes, bladder and bowel dysfunction.    Still taking B12 injections once monthly.      ROS:     SOCIAL HISTORY  Living arrangements - the patient lives with a roommate.  Social History     Socioeconomic History    Marital status: Single   Tobacco Use    Smoking status: Every Day     Current packs/day: 0.50     Types: Cigarettes    Smokeless tobacco: Never   Substance and Sexual Activity    Alcohol use: Yes    Drug use: Not Currently     Types: Marijuana, LSD, Cocaine, MDMA (Ecstacy)    Sexual activity: Yes     Partners: Male     Birth control/protection: None       Patient Employment       Status   Full Time    Employer   [employer]  (OTHER)    Address   ,                    2/9/2021     8:31 AM   REVIEW OF SYMPTOMS   Do you feel abnormally tired on most days? Yes   Do you feel you generally sleep well? No   Do you have difficulty controlling your bladder?  No   Do you have difficulty controlling your bowels?  No   Do you have frequent muscle cramps, tightness or spasms in your limbs?  Yes   Do you have new visual symptoms?  No   Do you have worsening difficulty with your memory or thinking? No   Do you have worsening symptoms of anxiety or depression?  No   For patients who walk, Do you have more difficulty walking?  No   Have you fallen since your last visit?  No   For patients who use wheelchairs: Do you have any skin wounds or " breakdown? Not Applicable   Do you have difficulty using your hands?  No   Do you have shooting or burning pain? Yes   Do you have difficulty with sexual function?  No   If you are sexually active, are you using birth control? Y/N  N/A No   Do you often choke when swallowing liquids or solid food?  No   Do you experience worsening symptoms when overheated? Yes   Do you need any new equipment such as a wheelchair, walker or shower chair? No   Do you receive co-pay financial assistance for your principal MS medicine? No   Would you be interested in participating in an MS research trial in the future? Yes   For patients on Gilenya, Tecfidera, Aubagio, Rituxan, Ocrevus, Tysabri, Lemtrada or Methotrexate, are you aware that you should NOT receive live virus vaccines?  Yes   Do you feel you have adequate family/friend support?  Yes   Do you have health insurance?   Yes   Are you currently employed? Yes   Do you receive SSDI/SSI?  Not Applicable   Do you use marijuana or cannabis products? No   Have you been diagnosed with a urinary tract infection since your last visit here? No   Have you been diagnosed with a respiratory tract infection since your last visit here? No   Have you been to the emergency room since your last visit here? No   Have you been hospitalized since your last visit here?  No         2/9/2021     8:33 AM   FSS SCORE & INTERPRETATION   FSS SCORE  48   FSS SCORE INTERPRETATION May be suffering from fatigue         2/9/2021     8:33 AM   MS RITA-D SCORE & INTERPRETATION   RITA-D SCORE  10   RITA-D INTERPRETATION  No indication of Depression         2/9/2021     8:31 AM   MS KOSTAS-7 SCORE & INTERPRETATION   KOSTAS-7 SCORE  6   KOSTAS-7 SCORE INTERPRETATION Mild Anxiety         2/9/2021     8:34 AM   PEQ MS MOS PAIN EFFECTS SCORE & INTERPRETATION   PES SCORE 10   PES SCORE INTERPRETATION Scores can range from 6-30.  Items are scaled so that higher scores indicate a greater impact of pain on a patients mood and  "behavior.          No data to display                  2/9/2021     8:34 AM   MS BLADDER CONTROL SCORE & INTERPRETATION   BLCS SCORE 0   BLCS SCORE INTERPRETATION  Scores can range from 0-22, with higher scores indicating greater bladder control problems.         2/9/2021     8:40 AM   MS BOWEL CONTROL SCORE & INTERPRETATION   BWCS SCORE 0   BWCS SCORE INTERPRETATION Scores can range from 0-26, with higher scores indicating greater bowel control problems.         2/9/2021     8:34 AM   PEQ MS IMPACT OF VISUAL IMPAIRMENT SCORE & INTERPRETATION   STANISLAW SCALE SCORE  0   STANISLAW SCORE INTERPRETATION Scores can range from 0-15, with higher scores indicating greater impact of visual problems on daily activites.         2/9/2021     8:40 AM   MS PDQ SCORE & INTERPRETATION   PDQ RETROSPECTIVE MEMORY SUBSCALE 8   PDQ ATTENTION/CONCENTRATION SUBSCALE 12   PDQ PROSPECTIVE MEMORY SUBSCALE 10   PDQ PLANNING/ORGANIZATION SUBSCALE 11   PDQ TOTAL SCORE 41   PDQ SCORE INTERPRETATION Scores can range from 0-80, with higher scores indicating greater perceived cognitive impairment.         2/9/2021     8:41 AM   MSSS SCORE & INTERPRETATION   MSSS TANGIBLE SUPPORT SUBSCALE 62.5   MSSS EMOTIONAL/INFORMATIONAL SUPPORT SUBSCALE 100   MSSS AFFECTIONATE SUPPORT SUBSCALE 75   MSSS POSITIVE SOCIAL INTERACTION SUBSCALE 66.67   MSSS TOTAL SCORE 76.04   MSSS SCORE INTERPRETATION Scores can range from 0-100, with higher scores indicating greater perceived support.         Exam:     Vitals:    05/08/24 1409   BP: 117/84   Pulse: 88   Weight: 82.1 kg (181 lb)   Height: 5' 5" (1.651 m)          In general, the patient is well nourished and appears to be in no acute distress.     MENTAL STATUS: language is fluent, normal verbal comprehension, attention is normal, patient is alert and oriented, fund of knowlege is appropriate by vocabulary.      CRANIAL NERVE EXAM:  There is no intrernuclear ophthalmoplegia.  Extraocular muscles are intact. Pupils are " equal, round, and reactive to light. No facial asymmetry. Facial sensation is intact bilaterally. There is no dysarthria. Uvula is midline, and palate moves symmetrically. Shoulder shrug intact bilaterlly. Tongue protrusion is midline. Hearing is intact to finger rub bilaterally. Neck is supple with full ROM. Neg Lhermitte's     MOTOR EXAM: Normal bulk and tone throughout UE and LE bilaterally.   No pronator drift; rapid sequential movements are normal; Strength is  5/5 in all groups in the lower extremities and upper extremities     SENSORY EXAM: Normal to light touch, pinprick throughout.     COORDINATION: Normal finger-to-nose exam. Normal heel-to-shin exam.     GAIT: Narrow based and stable.    25 foot walk test: 4.5 and 5 seconds.         7/8/2022    12:01 AM 11/20/2023    12:01 AM 12/28/2023    12:02 AM   Timed 25 Foot Walk:   Did patient wear an AFO? No No No   Was assistive device used? No No No   Time for 25 Foot Walk (seconds) 4.2 4.66 3.3   Time for 25 Foot Walk (seconds) 4 4.33 3.9            No data to display                Imaging (personally reviewed):     Results for orders placed during the hospital encounter of 12/16/22    MRI Brain Demyelinating Without Contrast    Impression  Stable white matter lesions intracranially.  No new lesion when compared to the prior study.    Electronically signed by resident: Levon Ortega  Date:    12/16/2022  Time:    15:50    Electronically signed by: Kimo Matta  Date:    12/16/2022  Time:    16:08    No results found for this or any previous visit.    No results found for this or any previous visit.    Results for orders placed during the hospital encounter of 12/13/23    MRI Brain Demyelinating W W/O Contrast    Impression  Brain appears stable from prior exam, again demonstrating a few T2/FLAIR signal hyperintensities which are not entirely specific but can be seen with reported history of multiple sclerosis.  There are no lesions within the spinal  cord.  No new, enhancing, or diffusion restriction lesions to indicate ongoing or active demyelination.    Electronically signed by resident: Grant Jones  Date:    12/13/2023  Time:    16:45    Electronically signed by: Cristiano Alvarez MD  Date:    12/14/2023  Time:    16:21        Results for orders placed during the hospital encounter of 12/13/23    MRI Cervical Spine Demyelinating W W/O Contrast    Impression  Brain appears stable from prior exam, again demonstrating a few T2/FLAIR signal hyperintensities which are not entirely specific but can be seen with reported history of multiple sclerosis.  There are no lesions within the spinal cord.  No new, enhancing, or diffusion restriction lesions to indicate ongoing or active demyelination.    Electronically signed by resident: Grant Jones  Date:    12/13/2023  Time:    16:45    Electronically signed by: Cristiano Alvarez MD  Date:    12/14/2023  Time:    16:21    Results for orders placed during the hospital encounter of 12/13/23    MRI Thoracic Spine Demyelinating W W/O Contrast    Impression  Brain appears stable from prior exam, again demonstrating a few T2/FLAIR signal hyperintensities which are not entirely specific but can be seen with reported history of multiple sclerosis.  There are no lesions within the spinal cord.  No new, enhancing, or diffusion restriction lesions to indicate ongoing or active demyelination.    Electronically signed by resident: Grant Jones  Date:    12/13/2023  Time:    16:45    Electronically signed by: Cristiano Alvarez MD  Date:    12/14/2023  Time:    16:21      Labs:     Lab Results   Component Value Date    ERQYFLEB03NR 35 06/30/2023    VNVYLVGO82RX 26 (L) 01/18/2022    KEXTCMQE31XS 24 (L) 04/14/2021     Lab Results   Component Value Date    JCVINDEX 0.66 (H) 06/24/2020    JCVANTIBODY POSITIVE (A) 06/24/2020     Lab Results   Component Value Date    ZW5IEKSU 82.3 06/24/2020    ABSOLUTECD3 2529.0 (H) 06/24/2020    JQ2PKLND 15.6  06/24/2020    ABSOLUTECD8 481.0 06/24/2020    XV5CPDKZ 66.1 (H) 06/24/2020    ABSOLUTECD4 2031.0 (H) 06/24/2020    LABCD48 4.22 (H) 06/24/2020     Lab Results   Component Value Date    WBC 9.89 11/20/2023    RBC 4.24 11/20/2023    HGB 13.3 11/20/2023    HCT 39.4 11/20/2023    MCV 93 11/20/2023    MCH 31.4 (H) 11/20/2023    MCHC 33.8 11/20/2023    RDW 12.2 11/20/2023     11/20/2023    MPV 12.3 11/20/2023    GRAN 6.2 11/20/2023    GRAN 63.1 11/20/2023    LYMPH 2.8 11/20/2023    LYMPH 28.2 11/20/2023    MONO 0.7 11/20/2023    MONO 6.7 11/20/2023    EOS 0.1 11/20/2023    BASO 0.03 11/20/2023    EOSINOPHIL 1.4 11/20/2023    BASOPHIL 0.3 11/20/2023     Sodium   Date Value Ref Range Status   11/20/2023 139 136 - 145 mmol/L Final     Potassium   Date Value Ref Range Status   11/20/2023 3.7 3.5 - 5.1 mmol/L Final     Chloride   Date Value Ref Range Status   11/20/2023 107 95 - 110 mmol/L Final     CO2   Date Value Ref Range Status   11/20/2023 23 23 - 29 mmol/L Final     Glucose   Date Value Ref Range Status   11/20/2023 96 70 - 110 mg/dL Final     BUN   Date Value Ref Range Status   11/20/2023 9 6 - 20 mg/dL Final     Creatinine   Date Value Ref Range Status   11/20/2023 0.7 0.5 - 1.4 mg/dL Final     Calcium   Date Value Ref Range Status   11/20/2023 9.4 8.7 - 10.5 mg/dL Final     Total Protein   Date Value Ref Range Status   11/20/2023 6.5 6.0 - 8.4 g/dL Final     Albumin   Date Value Ref Range Status   11/20/2023 3.6 3.5 - 5.2 g/dL Final     Total Bilirubin   Date Value Ref Range Status   11/20/2023 0.2 0.1 - 1.0 mg/dL Final     Comment:     For infants and newborns, interpretation of results should be based  on gestational age, weight and in agreement with clinical  observations.    Premature Infant recommended reference ranges:  Up to 24 hours.............<8.0 mg/dL  Up to 48 hours............<12.0 mg/dL  3-5 days..................<15.0 mg/dL  6-29 days.................<15.0 mg/dL       Alkaline Phosphatase    Date Value Ref Range Status   11/20/2023 70 55 - 135 U/L Final     AST   Date Value Ref Range Status   11/20/2023 16 10 - 40 U/L Final     ALT   Date Value Ref Range Status   11/20/2023 23 10 - 44 U/L Final     Anion Gap   Date Value Ref Range Status   11/20/2023 9 8 - 16 mmol/L Final     eGFR if    Date Value Ref Range Status   01/18/2022 >60.0 >60 mL/min/1.73 m^2 Final     eGFR if non    Date Value Ref Range Status   01/18/2022 >60.0 >60 mL/min/1.73 m^2 Final     Comment:     Calculation used to obtain the estimated glomerular filtration  rate (eGFR) is the CKD-EPI equation.          Diagnosis/Assessment/Plan:     NEURO MULTIPLE SCLEROSIS IMPRESSION:   Number of relapses in the past year?:  0  Clinical Progression:  Clinically Stable  MRI Progression:  Stable  MS Classification:  Relapsing-Remitting MS  DMT:  No change in management  Symptom Management:  No change in symptom management  Supplements:  Vit D    -continue Ocrevus for now  ->with slightly low IgG (602), may need to consider IVIg along w/Ocrevus in the future, especially if has more frequent/worse infections  -Ocrevus safety labs prior to next infusion (June this year)  -check Vit D and B12  -repeat MRI brain, cervical, thoracic w/o contrast  -will look into EEG results      Total time spent with the patient: 30 minutes, including face to face consultation, chart review and coordination of care, on the day of the visit. This includes face to face time and non-face to face time preparing to see the patient (eg, review of tests), obtaining and/or reviewing separately obtained history, documenting clinical information in the electronic or other health record, independently interpreting results and communicating results to the patient/family/caregiver, or care coordination.   I performed a neurobehavioral status examination that included a clinical assessment of thinking, reasoning, and judgment. Please see above HPI and  NEYMAR for full details.       Stuart Valentine D.O.  PGY-2  [facility]

## 2024-05-10 PROCEDURE — 95806 SLEEP STUDY UNATT&RESP EFFT: CPT | Mod: 26,,, | Performed by: INTERNAL MEDICINE

## 2024-05-10 NOTE — PROCEDURES
DATE: 3/21/24    EEG NUMBER:  OC     REFERRING PHYSICIAN:  Dr. Valentine      This EEG was performed to assess for evidence of underlying epilepsy.     ELECTROENCEPHALOGRAM REPORT     METHODOLOGY:  Electroencephalographic (EEG) recording is with electrodes placed according to the International 10-20 placement system.  Thirty two (32) channels of digital signal are simultaneously recorded from the scalp and may include EKG, EMG, and/or eye monitors.   Recording band pass was 0.1 to 512 hz.  Digital video recording of the patient is simultaneously recorded with the EEG.  The staff report clinical symptoms and may press an event button when the patient has symptoms of clinical interest to the treating physicians.  EEG and video recording is stored and archived in digital format.  The entire recording is visually reviewed, and the times identified by computer analysis as being spikes or seizures are reviewed again.  Activation procedures which include photic stimulation, hyperventilation and instructing patients to perform simple task are done in selected patients.   Compresses spectral analysis (CSA) is also performed on the activity recorded from each individual channel.  This is displayed as a power display of frequencies from 0 to 30 Hz over time.   The CSA analysis is done and displayed continuously.  This is reviewed for asymmetries in power between homologous areas of the scalp and for presence of changes in power which can be seen when seizures occur.  Sections of suspected abnormalities on the CSA is then compared with the original EEG recording.                [employer] software was also utilized in the review of this study.  This software suite analyzes the EEG recording in multiple domains.  Coherence and rhythmicity is computed to identify EEG sections which may contain organized seizures.  Each channel undergoes analysis to detect presence of spike and sharp waves which have special and morphological  characteristic of epileptic activity.  The routine EEG recording is converted from spacial into frequency domain.  This is then displayed comparing homologous areas to identify areas of significant asymmetry.  Algorithm to identify non-cortically generated artifact is used to separate eye movement, EMG and other artifact from the EEG.     EEG FINDINGS:  The recording was obtained with a number of standard bipolar and referential montages during wakefulness, drowsiness and sleep.  In the alert state, the posterior background rhythm was a symmetric, well-modulated 9 to 10 Hz alpha rhythm, which reacted symmetrically to eye opening.  Intermittent photic stimulation evoked symmetric posterior driving responses. Hyperventilation produced physiological slowing.  No abnormalities were activated by photic stimulation or hyperventilation.  During drowsiness, the background rhythm waxed and waned and there were periods of slowing.  During stage II sleep, symmetric V waves and sleep spindles were noted.  There were no focal abnormalities.  There were no interictal epileptiform abnormalities and no clinical or electrographic seizures were recorded.   Excessive beta activity was noted throughout the record which was diffuse.     The EKG channel revealed a sinus rhythm. High burden of PVCs      IMPRESSION:  This is a normal EEG during wakefulness, drowsiness and sleep.     CLINICAL CORRELATION:  The patient is a 38 -year-old female who is being evaluated for episodes of loss of confusion.  The patient is currently not maintained on any anti-seizure medications.  This is a normal EEG during wakefulness, drowsiness and sleep.  There is no evidence for neither cortical dysfunction nor an epileptic process on this recording.  No seizures were recorded during this study.

## 2024-05-11 ENCOUNTER — PATIENT MESSAGE (OUTPATIENT)
Dept: NEUROLOGY | Facility: CLINIC | Age: 39
End: 2024-05-11
Payer: COMMERCIAL

## 2024-06-13 ENCOUNTER — TELEPHONE (OUTPATIENT)
Dept: NEUROLOGY | Facility: CLINIC | Age: 39
End: 2024-06-13
Payer: COMMERCIAL

## 2024-06-18 ENCOUNTER — PATIENT MESSAGE (OUTPATIENT)
Dept: NEUROLOGY | Facility: CLINIC | Age: 39
End: 2024-06-18

## 2024-07-19 ENCOUNTER — DOCUMENTATION ONLY (OUTPATIENT)
Dept: NEUROLOGY | Facility: CLINIC | Age: 39
End: 2024-07-19
Payer: COMMERCIAL

## 2024-07-25 ENCOUNTER — PATIENT MESSAGE (OUTPATIENT)
Dept: PSYCHIATRY | Facility: CLINIC | Age: 39
End: 2024-07-25
Payer: COMMERCIAL

## 2024-08-05 ENCOUNTER — PATIENT MESSAGE (OUTPATIENT)
Dept: PSYCHIATRY | Facility: CLINIC | Age: 39
End: 2024-08-05
Payer: COMMERCIAL

## 2024-09-13 ENCOUNTER — PATIENT MESSAGE (OUTPATIENT)
Dept: PSYCHIATRY | Facility: CLINIC | Age: 39
End: 2024-09-13
Payer: COMMERCIAL

## 2024-09-13 DIAGNOSIS — R06.00 DYSPNEA, UNSPECIFIED TYPE: ICD-10-CM

## 2024-09-13 NOTE — TELEPHONE ENCOUNTER
No care due was identified.  [facility] Embedded Care Due Messages. Reference number: [id number].   9/13/2024 11:12:44 AM CDT

## 2024-09-19 ENCOUNTER — PATIENT MESSAGE (OUTPATIENT)
Dept: NEUROLOGY | Facility: CLINIC | Age: 39
End: 2024-09-19
Payer: COMMERCIAL

## 2024-10-08 RX ORDER — ALBUTEROL SULFATE 90 UG/1
2 INHALANT RESPIRATORY (INHALATION) EVERY 6 HOURS PRN
Qty: 18 G | Refills: 2 | Status: SHIPPED | OUTPATIENT
Start: 2024-10-08 | End: 2025-10-08

## 2024-10-09 NOTE — TELEPHONE ENCOUNTER
Made Eileen at [facility] aware of new [facility] insurance.

## 2024-10-14 ENCOUNTER — OFFICE VISIT (OUTPATIENT)
Dept: OBSTETRICS AND GYNECOLOGY | Facility: CLINIC | Age: 39
End: 2024-10-14
Payer: COMMERCIAL

## 2024-10-14 ENCOUNTER — LAB VISIT (OUTPATIENT)
Dept: LAB | Facility: OTHER | Age: 39
End: 2024-10-14
Attending: OBSTETRICS & GYNECOLOGY
Payer: COMMERCIAL

## 2024-10-14 VITALS
BODY MASS INDEX: 32.03 KG/M2 | HEIGHT: 65 IN | SYSTOLIC BLOOD PRESSURE: 118 MMHG | DIASTOLIC BLOOD PRESSURE: 82 MMHG | WEIGHT: 192.25 LBS

## 2024-10-14 DIAGNOSIS — Z01.419 ENCOUNTER FOR ANNUAL ROUTINE GYNECOLOGICAL EXAMINATION: Primary | ICD-10-CM

## 2024-10-14 DIAGNOSIS — Z11.3 SCREENING EXAMINATION FOR STD (SEXUALLY TRANSMITTED DISEASE): ICD-10-CM

## 2024-10-14 DIAGNOSIS — R30.0 DYSURIA: ICD-10-CM

## 2024-10-14 LAB
BILIRUB SERPL-MCNC: NORMAL MG/DL
BLOOD URINE, POC: NORMAL
CLARITY, POC UA: NORMAL
COLOR, POC UA: YELLOW
GLUCOSE UR QL STRIP: NORMAL
HBV SURFACE AG SERPL QL IA: NORMAL
HIV 1+2 AB+HIV1 P24 AG SERPL QL IA: NEGATIVE
KETONES UR QL STRIP: NORMAL
LEUKOCYTE ESTERASE URINE, POC: NORMAL
NITRITE, POC UA: NORMAL
PH, POC UA: 6
PROTEIN, POC: NORMAL
SPECIFIC GRAVITY, POC UA: 1.01
TREPONEMA PALLIDUM IGG+IGM AB [PRESENCE] IN SERUM OR PLASMA BY IMMUNOASSAY: NONREACTIVE
UROBILINOGEN, POC UA: NORMAL

## 2024-10-14 PROCEDURE — 87389 HIV-1 AG W/HIV-1&-2 AB AG IA: CPT | Performed by: OBSTETRICS & GYNECOLOGY

## 2024-10-14 PROCEDURE — 87491 CHLMYD TRACH DNA AMP PROBE: CPT | Performed by: OBSTETRICS & GYNECOLOGY

## 2024-10-14 PROCEDURE — 36415 COLL VENOUS BLD VENIPUNCTURE: CPT | Performed by: OBSTETRICS & GYNECOLOGY

## 2024-10-14 PROCEDURE — 1160F RVW MEDS BY RX/DR IN RCRD: CPT | Mod: CPTII,S$GLB,, | Performed by: OBSTETRICS & GYNECOLOGY

## 2024-10-14 PROCEDURE — 3079F DIAST BP 80-89 MM HG: CPT | Mod: CPTII,S$GLB,, | Performed by: OBSTETRICS & GYNECOLOGY

## 2024-10-14 PROCEDURE — 99395 PREV VISIT EST AGE 18-39: CPT | Mod: S$GLB,,, | Performed by: OBSTETRICS & GYNECOLOGY

## 2024-10-14 PROCEDURE — 87340 HEPATITIS B SURFACE AG IA: CPT | Performed by: OBSTETRICS & GYNECOLOGY

## 2024-10-14 PROCEDURE — 87591 N.GONORRHOEAE DNA AMP PROB: CPT | Performed by: OBSTETRICS & GYNECOLOGY

## 2024-10-14 PROCEDURE — 3008F BODY MASS INDEX DOCD: CPT | Mod: CPTII,S$GLB,, | Performed by: OBSTETRICS & GYNECOLOGY

## 2024-10-14 PROCEDURE — 3074F SYST BP LT 130 MM HG: CPT | Mod: CPTII,S$GLB,, | Performed by: OBSTETRICS & GYNECOLOGY

## 2024-10-14 PROCEDURE — 1159F MED LIST DOCD IN RCRD: CPT | Mod: CPTII,S$GLB,, | Performed by: OBSTETRICS & GYNECOLOGY

## 2024-10-14 PROCEDURE — 86593 SYPHILIS TEST NON-TREP QUANT: CPT | Performed by: OBSTETRICS & GYNECOLOGY

## 2024-10-14 PROCEDURE — 81002 URINALYSIS NONAUTO W/O SCOPE: CPT | Mod: S$GLB,,, | Performed by: OBSTETRICS & GYNECOLOGY

## 2024-10-14 PROCEDURE — 99999 PR PBB SHADOW E&M-EST. PATIENT-LVL III: CPT | Mod: PBBFAC,,, | Performed by: OBSTETRICS & GYNECOLOGY

## 2024-10-14 NOTE — PROGRESS NOTES
"  SUBJECTIVE:     Chief Complaint: Well Woman       History of Present Illness:  Annual Exam  Patient presents for annual exam.   She c/o intermittent urgency and dysuria today. Feels like there are "bubbles" in her urethra occasionally. Not linked to intercourse or periods. No WOODY.   LMP: 24  She denies any vd, vb, dyspareunia, depression, anxiety.  Last pap was in  and was neg. HPV neg.  Birth Control: condoms  wants STD testing.     GYN screening history: h/o LEEP , denies stds  Mammogram history: na  Colonoscopy history: na  Dexa history: na     FH:   Breast cancer: none  Colon cancer: maternal GM  Ovarian cancer: none       Review of patient's allergies indicates:   Allergen Reactions    Gluten protein     Soy        Past Medical History:   Diagnosis Date    Abnormal Pap smear of cervix     Anxiety     Depression     H/O LEEP     Multiple sclerosis      Past Surgical History:   Procedure Laterality Date    CERVICAL BIOPSY  W/ LOOP ELECTRODE EXCISION      COLONOSCOPY N/A 10/25/2022    Procedure: COLONOSCOPY;  Surgeon: Janie Fleming MD;  Location: [facility];  Service: Endoscopy;  Laterality: N/A;    ESOPHAGOGASTRODUODENOSCOPY N/A 10/25/2022    Procedure: EGD (ESOPHAGOGASTRODUODENOSCOPY);  Surgeon: Janie Fleming MD;  Location: [facility];  Service: Endoscopy;  Laterality: N/A;    TONSILLECTOMY       OB History          2    Para        Term                AB   2    Living             SAB        IAB        Ectopic        Multiple        Live Births                   Family History   Problem Relation Name Age of Onset    Colon cancer Maternal Grandmother  78    Diabetes Maternal Grandmother      Diabetes Mother       Social History     Tobacco Use    Smoking status: Every Day     Current packs/day: 0.50     Types: Cigarettes    Smokeless tobacco: Never   Substance Use Topics    Alcohol use: Yes    Drug use: Not Currently     Types: Marijuana, LSD, Cocaine, MDMA (Ecstacy) "       Current Outpatient Medications   Medication Sig    albuterol (VENTOLIN HFA) 90 mcg/actuation inhaler Inhale 2 puffs into the lungs every 6 (six) hours as needed for Wheezing. Rescue    buPROPion (WELLBUTRIN XL) 300 MG 24 hr tablet Take 1 tablet (300 mg total) by mouth every morning.    buPROPion (WELLBUTRIN XL) 300 MG 24 hr tablet TAKE 1 TABLET BY MOUTH EVERY MORNING    cholecalciferol, vitamin D3, 1,250 mcg (50,000 unit) capsule Take 1 capsule (50,000 Units total) by mouth once a week.    fluticasone propionate (FLONASE) 50 mcg/actuation nasal spray spray 2 sprays (100 mcg total) by Each Nostril route 2 (two) times a day.    gabapentin (NEURONTIN) 300 MG capsule Take 300 mg by mouth 3 (three) times daily.    LORazepam (ATIVAN) 1 MG tablet Take 1 tablet by mouth every day as needed    LORazepam (ATIVAN) 1 MG tablet Take 1 tablet by mouth every day as needed for 30 days.    magnesium 250 mg Tab Take 250 mg by mouth once daily.    methocarbamoL (ROBAXIN) 500 MG Tab Take 1 tablet (500 mg total) by mouth 4 (four) times daily as needed (muscle pain (TMJ)).    methylphenidate HCl (RITALIN) 10 MG tablet Take 1 tablet every day by oral route in the morning.    methylphenidate HCl (RITALIN) 10 MG tablet Take 1 tablet every day by oral route in the morning.    methylphenidate HCl (RITALIN) 10 MG tablet Take 1 tablet by mouth every day in the morning.    methylphenidate HCl (RITALIN) 10 MG tablet Take 1 tablet by mouth every day in the morning.    methylphenidate HCl (RITALIN) 10 MG tablet Take 1 tablet every day by oral route in the morning.    methylphenidate HCl (RITALIN) 10 MG tablet Take 1 tablet every day by oral route in the morning.    methylphenidate HCl (RITALIN) 10 MG tablet Take 1 tablet every day by oral route in the morning.    ocrelizumab (OCREVUS IV) Inject into the vein.    promethazine-dextromethorphan (PROMETHAZINE-DM) 6.25-15 mg/5 mL Syrp Take 5 mLs by mouth every 4 (four) hours as needed (cough).     WELLBUTRIN  mg 24 hr tablet Take 300 mg by mouth every morning.    methylphenidate HCl (CONCERTA) 18 MG CR tablet Take 1 tablet every day by oral route. (Patient not taking: Reported on 12/28/2023)     No current facility-administered medications for this visit.       Review of Systems:  GENERAL: No fever, chills, fatigability or weight loss.  CARDIOVASCULAR: No chest pain. No palpitations.  RESPIRATORY: No SOB, no wheezing.  BREAST: Denies pain. No lumps. No discharge.  VULVAR: No pain, no lesions and no itching.  VAGINAL: No relaxation, no itching, no discharge, no abnormal bleeding and no lesions.  ABDOMEN: No abdominal pain. Denies nausea. Denies vomiting. No diarrhea. No constipation  URINARY: No incontinence, no nocturia, no frequency and + dysuria.  NEUROLOGICAL: No headaches. No vision changes.       OBJECTIVE:     Vitals:    10/14/24 0907   BP: 118/82       Patient verbally consents to pelvic and breast exams. Female chaperone present for exams.   Physical Exam:  Gen: NAD, well developed, well-nourished  HEENT: Normocephalic, atraumatic  Eyes: EOM nl, conjuntivae normal  Neck: ROM normal, no thyromegaly  Respiratory: Effort normal   Abd: soft, nontender, no masses palpated  Breast: Normal bilaterally, no masses, lesions or tenderness. No nipple discharge on expression, no lymphadenopathy bilaterally.  SSE:  Vulva: no lesions or rashes  Cervix: No lesions noted, nonfriable, no vaginal discharge or vaginal bleeding noted  BME:   Cervix: No CMT  Adnexa: nl bilaterally, no masses or fullness palpated  Uterus: normal, nonenlarged  Musculoskeletal: normal ROM  Neuro: alert, AAOx3  Skin: warm and dry  Psych: mood/affect nl, behavior normal, judgement normal, thought content normal        ASSESSMENT:       ICD-10-CM ICD-9-CM    1. Encounter for annual routine gynecological examination  Z01.419 V72.31       2. Dysuria  R30.0 788.1 POCT URINE DIPSTICK WITHOUT MICROSCOPE      Ambulatory referral/consult to  Urogynecology      3. Screening examination for STD (sexually transmitted disease)  Z11.3 V74.5 HIV 1/2 Ag/Ab (4th Gen)      HEPATITIS B SURFACE ANTIGEN      Treponema Pallidium Antibodies IgG, IgM      C. trachomatis/N. gonorrhoeae by AMP DNA             Plan:      Cari was seen today for well woman.    Diagnoses and all orders for this visit:    Encounter for annual routine gynecological examination    Dysuria  -     POCT URINE DIPSTICK WITHOUT MICROSCOPE  -     Ambulatory referral/consult to Urogynecology; Future    Screening examination for STD (sexually transmitted disease)  -     HIV 1/2 Ag/Ab (4th Gen); Future  -     HEPATITIS B SURFACE ANTIGEN; Future  -     Treponema Pallidium Antibodies IgG, IgM; Future  -     C. trachomatis/N. gonorrhoeae by AMP DNA        Orders Placed This Encounter   Procedures    HIV 1/2 Ag/Ab (4th Gen)    HEPATITIS B SURFACE ANTIGEN    Treponema Pallidium Antibodies IgG, IgM    C. trachomatis/N. gonorrhoeae by AMP DNA    Ambulatory referral/consult to Urogynecology    POCT URINE DIPSTICK WITHOUT MICROSCOPE       Follow up in one year for annual, or prn.    Julie R Jeansonne

## 2024-10-15 LAB
C TRACH DNA SPEC QL NAA+PROBE: NOT DETECTED
N GONORRHOEA DNA SPEC QL NAA+PROBE: NOT DETECTED

## 2024-11-05 ENCOUNTER — HOSPITAL ENCOUNTER (OUTPATIENT)
Dept: RADIOLOGY | Facility: HOSPITAL | Age: 39
Discharge: HOME | End: 2024-11-05
Payer: COMMERCIAL

## 2024-11-08 ENCOUNTER — TELEPHONE (OUTPATIENT)
Dept: DERMATOLOGY | Facility: CLINIC | Age: 39
End: 2024-11-08
Payer: COMMERCIAL

## 2024-11-17 ENCOUNTER — HOSPITAL ENCOUNTER (OUTPATIENT)
Dept: RADIOLOGY | Facility: HOSPITAL | Age: 39
Discharge: HOME | End: 2024-11-17
Payer: COMMERCIAL

## 2024-11-17 DIAGNOSIS — G35 MULTIPLE SCLEROSIS: ICD-10-CM

## 2024-11-17 PROCEDURE — 72141 MRI NECK SPINE W/O DYE: CPT | Mod: 26,,, | Performed by: RADIOLOGY

## 2024-11-17 PROCEDURE — 70551 MRI BRAIN STEM W/O DYE: CPT | Mod: TC

## 2024-11-17 PROCEDURE — 70551 MRI BRAIN STEM W/O DYE: CPT | Mod: 26,,, | Performed by: RADIOLOGY

## 2024-11-17 PROCEDURE — 72146 MRI CHEST SPINE W/O DYE: CPT | Mod: TC

## 2024-11-17 PROCEDURE — 72141 MRI NECK SPINE W/O DYE: CPT | Mod: TC

## 2024-11-17 PROCEDURE — 72146 MRI CHEST SPINE W/O DYE: CPT | Mod: 26,,, | Performed by: RADIOLOGY

## 2024-11-22 ENCOUNTER — TELEPHONE (OUTPATIENT)
Dept: DERMATOLOGY | Facility: CLINIC | Age: 39
End: 2024-11-22
Payer: COMMERCIAL

## 2024-11-22 NOTE — TELEPHONE ENCOUNTER
"----- Message from Erin sent at 11/22/2024 12:03 PM CST -----  Regarding: pt advice  Contact: [phone]  .Name Of Caller: Self     Contact Preference?:[phone]     What is the nature of the call?: in reference to pt appt 1/9 /24, cancelled, pls call      Additional Notes:  "Thank you for all that you do for our patients"  "

## 2024-12-16 ENCOUNTER — PATIENT MESSAGE (OUTPATIENT)
Dept: PSYCHIATRY | Facility: CLINIC | Age: 39
End: 2024-12-16
Payer: COMMERCIAL

## 2024-12-19 ENCOUNTER — OFFICE VISIT (OUTPATIENT)
Dept: UROGYNECOLOGY | Facility: CLINIC | Age: 39
End: 2024-12-19
Payer: COMMERCIAL

## 2024-12-19 VITALS
HEART RATE: 77 BPM | BODY MASS INDEX: 32.21 KG/M2 | DIASTOLIC BLOOD PRESSURE: 88 MMHG | SYSTOLIC BLOOD PRESSURE: 119 MMHG | WEIGHT: 193.31 LBS | HEIGHT: 65 IN

## 2024-12-19 DIAGNOSIS — R30.0 DYSURIA: ICD-10-CM

## 2024-12-19 DIAGNOSIS — N89.8 VAGINAL DISCHARGE: Primary | ICD-10-CM

## 2024-12-19 DIAGNOSIS — N39.0 FREQUENT UTI: ICD-10-CM

## 2024-12-19 PROCEDURE — 51701 INSERT BLADDER CATHETER: CPT | Mod: S$GLB,,, | Performed by: NURSE PRACTITIONER

## 2024-12-19 PROCEDURE — 0352U VAGINOSIS SCREEN BY DNA PROBE: CPT | Performed by: NURSE PRACTITIONER

## 2024-12-19 PROCEDURE — 99214 OFFICE O/P EST MOD 30 MIN: CPT | Mod: 25,S$GLB,, | Performed by: NURSE PRACTITIONER

## 2024-12-19 PROCEDURE — 1160F RVW MEDS BY RX/DR IN RCRD: CPT | Mod: CPTII,S$GLB,, | Performed by: NURSE PRACTITIONER

## 2024-12-19 PROCEDURE — 3074F SYST BP LT 130 MM HG: CPT | Mod: CPTII,S$GLB,, | Performed by: NURSE PRACTITIONER

## 2024-12-19 PROCEDURE — 1159F MED LIST DOCD IN RCRD: CPT | Mod: CPTII,S$GLB,, | Performed by: NURSE PRACTITIONER

## 2024-12-19 PROCEDURE — 3079F DIAST BP 80-89 MM HG: CPT | Mod: CPTII,S$GLB,, | Performed by: NURSE PRACTITIONER

## 2024-12-19 PROCEDURE — 87086 URINE CULTURE/COLONY COUNT: CPT | Performed by: NURSE PRACTITIONER

## 2024-12-19 PROCEDURE — 99999 PR PBB SHADOW E&M-EST. PATIENT-LVL V: CPT | Mod: PBBFAC,,, | Performed by: NURSE PRACTITIONER

## 2024-12-19 PROCEDURE — 3008F BODY MASS INDEX DOCD: CPT | Mod: CPTII,S$GLB,, | Performed by: NURSE PRACTITIONER

## 2024-12-19 NOTE — PATIENT INSTRUCTIONS
1)  Frequent UTIs (bladder infections):  --urine C&S sent today  --If you feel like you have a UTI:     --During the work week: call PCP or go to nearest [facility]   --After hours or on weekends: go to nearest [facility]    --These facilities can check your urine for infection, send a urine culture to verify presence/absence of infection, and start treatment if needed.    --After you are evaluated, please send a message through [facility] portal or call your urogynecology provider's office to let them know so that they can follow trends.  --follow UTI prevention tips (see attached)  --work on emptying bladder well:  --empty bladder every 2-3 hours.  Empty well: wait a minute, lean forward on toilet.    --prolapse present: no   --PVR today: 60  --control bowel movements/fecal cross-contamination  --treat vaginal atrophy (dryness)  --empty bladder before and after intercourse  --for now continue probiotic  --consider taking daily combination pill: cranberry + D-mannose (0057-9928 mg) + probiotic    --look on [employer] or in drug/grocery store for any brand that has these components   --examples of brands: Biophix, Now, AZO  --consider need for further evaluation ( tract imaging, cystoscopy) if issue persists    2. Vaginal discharge  --affirm today    3. RTC 4 months for follow up    Bladder Irritants  Certain foods and drinks have been associated with worsening symptoms of urinary frequency, urgency, urge incontinence, or  bladder pain. If you suffer from any of these conditions, you may wish to try eliminating one or more of these foods from your  diet and see if your symptoms improve. If bladder symptoms are related to dietary factors, strict adherence to a diet that  eliminates the food should bring marked relief in 10 days. Once you are feeling better, you can begin to add foods back into  your diet, one at a time. If symptoms return, you will be able to identify the irritant. As you add foods back  to your diet it is  very important that you drink significant amounts of water.  Low-acid fruit substitutions include apricots, papaya, pears and watermelon. Coffee drinkers can drink Kava or other lowacid  instant drinks. Tea drinkers can substitute non-citrus herbal and sun brewed teas. Calcium carbonate co-buffered with  calcium ascorbate can be substituted for Vitamin C. Prelief is a dietary supplement that works as an acid blocker for the  bladder.  Where to get more information:   Overcoming Bladder Disorders by Cee Bui and Thaddeus Sparks, 1990   You Dont Have to Live with Cystitis! By Farrah Tom, 1988  List of Common Bladder Irritants*  Alcoholic beverages  Apples and apple juice  Cantaloupe  Carbonated beverages  Chili and spicy foods  Chocolate  Citrus fruit  Coffee (including decaffeinated)  Cranberries and cranberry juice  Grapes  Guava  Milk Products: milk, cheese, cottage cheese, yogurt, ice cream  Peaches  Pineapple  Plums  Strawberries  Sugar especially artificial sweeteners, saccharin, aspartame, corn sweeteners, honey, fructose, sucrose, lactose  Tea  Tomatoes and tomato juice  Vitamin B complex  Vinegar  *Most people are not sensitive to ALL of these products; your goal is to find the foods that make YOUR  symptoms worse

## 2024-12-19 NOTE — PROGRESS NOTES
[facility], [place], operated by [facility] - UROGYNECOLOGY  [address] [address]    Cari Barillas  [id number]  1985  2024    Consulting Physician: Jeansonne, Julie R., MD     Primary M.D.: Gurwinder Michael MD    Chief Complaint   Patient presents with    Dysuria    Urinary Tract Infection       HPI:     1)  UI:  (+) WOODY < (+) UUI  X 5years.  (--) pads rarely has to change clothes.  Daytime frequency: Q 3 hours.  Nocturia: No:   (--) dysuria,  (--) hematuria,  (+) frequent UTIs.  (+) complete bladder emptying.     2)  POP:  Absent.  Symptoms:(--)  .  (--) vaginal bleeding. (--) vaginal discharge. (+) sexually active.  (--) dyspareunia.   (--)  Vaginal dryness.  (--) vaginal estrogen use.     3)  BM:  (+) constipation/straining--rare  (--) chronic diarrhea. (--) hematochezia.  (--) fecal incontinence.  (--) fecal smearing/urgency.  (+) complete evacuation.      4)frequent uti  --symptoms include dysuria, urinary frequency, small voids  --only one culture on file-- e. Coli     5)MS  On ocrevis    Past Medical History  Past Medical History:   Diagnosis Date    Abnormal Pap smear of cervix     Anxiety     Depression     H/O LEEP     Multiple sclerosis         Past Surgical History  Past Surgical History:   Procedure Laterality Date    CERVICAL BIOPSY  W/ LOOP ELECTRODE EXCISION      COLONOSCOPY N/A 10/25/2022    Procedure: COLONOSCOPY;  Surgeon: Janie Fleming MD;  Location: [facility];  Service: Endoscopy;  Laterality: N/A;    ESOPHAGOGASTRODUODENOSCOPY N/A 10/25/2022    Procedure: EGD (ESOPHAGOGASTRODUODENOSCOPY);  Surgeon: Janie Fleming MD;  Location: [facility];  Service: Endoscopy;  Laterality: N/A;    TONSILLECTOMY          Hysterectomy: No      Past Ob History    .      Gynecologic History  LMP: Patient's last menstrual period was 2024.  Age of menarche: 12  Age of menopause: n/a  Menstrual history: menses q month lasting 4-6 days  Pap test: 2023 normal hpv  negative.  History of abnormal paps: Yes - LEEP.  History of STIs:  Yes - hpv  Mammogram: n/a  Colonoscopy: Date of last: 10/2022edundant left colon.                          - Internal hemorrhoids.                          - Hemorrhoids found on perianal exam. .  DEXA:  n/a     Family History  Family History   Problem Relation Name Age of Onset    Colon cancer Maternal Grandmother  78    Diabetes Maternal Grandmother      Diabetes Mother        Colon CA: Yes - maternal gm  Breast CA: No  GYN CA: No   CA: No    Social History  Social History     Tobacco Use   Smoking Status Every Day    Current packs/day: 0.50    Types: Cigarettes   Smokeless Tobacco Never   .  Smokes 1/2 ppd  Social History     Substance and Sexual Activity   Alcohol Use Yes   .    Social History     Substance and Sexual Activity   Drug Use Not Currently    Types: Marijuana, LSD, Cocaine, MDMA (Ecstacy)   .      Allergies  Review of patient's allergies indicates:   Allergen Reactions    Gluten protein     Soy        Medications  Current Outpatient Medications on File Prior to Visit   Medication Sig Dispense Refill    albuterol (VENTOLIN HFA) 90 mcg/actuation inhaler Inhale 2 puffs into the lungs every 6 (six) hours as needed for Wheezing. Rescue 18 g 2    buPROPion (WELLBUTRIN XL) 300 MG 24 hr tablet Take 1 tablet (300 mg total) by mouth every morning. 30 tablet 11    buPROPion (WELLBUTRIN XL) 300 MG 24 hr tablet TAKE 1 TABLET BY MOUTH EVERY MORNING 30 tablet 11    fluticasone propionate (FLONASE) 50 mcg/actuation nasal spray spray 2 sprays (100 mcg total) by Each Nostril route 2 (two) times a day. 16 g 2    gabapentin (NEURONTIN) 300 MG capsule Take 300 mg by mouth 3 (three) times daily.      LORazepam (ATIVAN) 1 MG tablet Take 1 tablet by mouth every day as needed 30 tablet 1    LORazepam (ATIVAN) 1 MG tablet Take 1 tablet by mouth every day as needed for 30 days. 30 tablet 1    magnesium 250 mg Tab Take 250 mg by mouth once daily.       methocarbamoL (ROBAXIN) 500 MG Tab Take 1 tablet (500 mg total) by mouth 4 (four) times daily as needed (muscle pain (TMJ)). 30 tablet 2    methylphenidate HCl (RITALIN) 10 MG tablet Take 1 tablet every day by oral route in the morning. 30 tablet 0    methylphenidate HCl (RITALIN) 10 MG tablet Take 1 tablet every day by oral route in the morning. 30 tablet 0    methylphenidate HCl (RITALIN) 10 MG tablet Take 1 tablet by mouth every day in the morning. 30 tablet 0    methylphenidate HCl (RITALIN) 10 MG tablet Take 1 tablet by mouth every day in the morning. 30 tablet 0    methylphenidate HCl (RITALIN) 10 MG tablet Take 1 tablet every day by oral route in the morning. 30 tablet 0    methylphenidate HCl (RITALIN) 10 MG tablet Take 1 tablet every day by oral route in the morning. 30 tablet 0    methylphenidate HCl (RITALIN) 10 MG tablet Take 1 tablet every day by oral route in the morning. 30 tablet 0    ocrelizumab (OCREVUS IV) Inject into the vein.      promethazine-dextromethorphan (PROMETHAZINE-DM) 6.25-15 mg/5 mL Syrp Take 5 mLs by mouth every 4 (four) hours as needed (cough). 118 mL 0    WELLBUTRIN  mg 24 hr tablet Take 300 mg by mouth every morning.      methylphenidate HCl (CONCERTA) 18 MG CR tablet Take 1 tablet every day by oral route. (Patient not taking: Reported on 12/28/2023) 30 tablet 0     No current facility-administered medications on file prior to visit.       Review of Systems A 14 point ROS was reviewed with pertinent positives as noted above in the history of present illness.      Constitutional: negative  Eyes: negative  Endocrine: negative  Gastrointestinal: negative  Cardiovascular: negative  Respiratory: negative  Allergic/Immunologic: negative  Integumentary: negative  Psychiatric: negative  Musculoskeletal: negative   Ear/Nose/Throat: negative  Neurologic: negative  Genitourinary: SEE HPI  Hematologic/Lymphatic: negative   Breast: negative    Urogynecologic Exam  /88 (BP Location:  "Left arm, Patient Position: Sitting)   Pulse 77   Ht 5' 5" (1.651 m)   Wt 87.7 kg (193 lb 5.5 oz)   LMP 12/13/2024   BMI 32.17 kg/m²     GENERAL APPEARANCE:  The patient is well-developed, well-nourished.   Neck:  Supple with no thyromegaly, no carotid bruits.  Heart:  Regular rate and rhythm, no murmurs, rubs or gallops.  Lungs:  Clear.  No CVA tenderness.  Abdomen:  Soft, nontender, nondistended, no hepatosplenomegaly.      PELVIC:    External genitalia:  Normal Bartholins, Skenes and labia bilaterally.    Urethra:  No caruncle, diverticulum or masses.  (--) hypermobility.    Vagina:  Atrophy (--) , no bladder masses or tender, thin tan discharge.    Cervix:  normal appearance  Uterus: normal size, contour, position, consistency, mobility, non-tender  Adnexa: Not palpable.    POP-Q:   No obvious POP present with valsalva.     NEUROLOGIC:  Cranial nerves 2 through 12 intact.  Strength 5/5.  DTRs 2+ lower extremities.  S2 through 4 normal.  Sacral reflexes intact.    EXT: TOLENTINO, 2+ pulses bilaterally, no C/C/E    COUGH STRESS TEST:  negative  KEGEL: 3 /5    RECTAL:    External:  Normal, (--) hemorrhoids, (--) dovetailing.   Internal:  deferred    PVR: 60 mL    Impression    1. Vaginal discharge    2. Dysuria    3. Frequent UTI        Initial Plan  The patient was counseled regarding these issues. The patient was given a summary sheet containing each of these issues with possible options for evaluation and management. When appropriate, we also reviewed computer-generated diagrams specific to their diagnoses..  All questions were addressed to the patient's satisfaction.     1)  Frequent UTIs (bladder infections):  --urine C&S sent today  --If you feel like you have a UTI:     --During the work week: call PCP or go to nearest [facility]   --After hours or on weekends: go to nearest [facility]    --These facilities can check your urine for infection, send a urine culture to verify presence/absence " of infection, and start treatment if needed.    --After you are evaluated, please [facility] a [facility] through [facility] or call your urogynecology provider's office to let them know so that they can follow trends.  --follow UTI prevention tips (see attached)  --work on emptying bladder well:  --empty bladder every 2-3 hours.  Empty well: wait a minute, lean forward on toilet.    --prolapse present: no   --PVR today: 60  --control bowel movements/fecal cross-contamination  --treat vaginal atrophy (dryness)  --empty bladder before and after intercourse  --for now continue probiotic  --consider taking daily combination pill: cranberry + D-mannose (5988-8298 mg) + probiotic    --look on [employer] or in drug/grocery store for any brand that has these components   --examples of brands: Biophix, Now, AZO  --consider need for further evaluation ( tract imaging, cystoscopy) if issue persists    2. Vaginal discharge  --affirm today    3. RTC 4 months for follow up  I spent a total of 30 minutes on the day of the visit.  This includes face to face time and non-face to face time preparing to see the patient (eg, review of tests), obtaining and/or reviewing separately obtained history, documenting clinical information in the electronic or other health record, independently interpreting results and communicating results to the patient/family/caregiver, or care coordinator.     Thank you for requesting consultation of your patient.  I look forward to participating in their care.    Stephanie Smith  Female Pelvic Medicine and Reconstructive Surgery  [facility] [place], [place]

## 2024-12-20 LAB — BACTERIA UR CULT: NO GROWTH

## 2024-12-21 LAB
BACTERIAL VAGINOSIS DNA: DETECTED
CANDIDA GLABRATA/KRUSEI: NOT DETECTED
CANDIDA RRNA VAG QL PROBE: NOT DETECTED
TRICHOMONAS VAGINALIS: NOT DETECTED

## 2024-12-24 ENCOUNTER — PATIENT MESSAGE (OUTPATIENT)
Dept: UROGYNECOLOGY | Facility: CLINIC | Age: 39
End: 2024-12-24
Payer: COMMERCIAL

## 2024-12-24 DIAGNOSIS — N76.0 BACTERIAL VAGINOSIS: Primary | ICD-10-CM

## 2024-12-24 DIAGNOSIS — B96.89 BACTERIAL VAGINOSIS: Primary | ICD-10-CM

## 2024-12-24 RX ORDER — METRONIDAZOLE 500 MG/1
500 TABLET ORAL EVERY 12 HOURS
Qty: 14 TABLET | Refills: 0 | Status: SHIPPED | OUTPATIENT
Start: 2024-12-24 | End: 2025-01-03

## 2024-12-27 ENCOUNTER — TELEPHONE (OUTPATIENT)
Dept: NEUROLOGY | Facility: CLINIC | Age: 39
End: 2024-12-27
Payer: COMMERCIAL

## 2024-12-27 ENCOUNTER — LAB VISIT (OUTPATIENT)
Dept: LAB | Facility: OTHER | Age: 39
End: 2024-12-27
Attending: CLINICAL NURSE SPECIALIST
Payer: COMMERCIAL

## 2024-12-27 DIAGNOSIS — G35 MULTIPLE SCLEROSIS: ICD-10-CM

## 2024-12-27 LAB
ALBUMIN SERPL BCP-MCNC: 4.1 G/DL (ref 3.5–5.2)
ALP SERPL-CCNC: 80 U/L (ref 40–150)
ALT SERPL W/O P-5'-P-CCNC: 22 U/L (ref 10–44)
ANION GAP SERPL CALC-SCNC: 10 MMOL/L (ref 8–16)
AST SERPL-CCNC: 21 U/L (ref 10–40)
BASOPHILS # BLD AUTO: 0.03 K/UL (ref 0–0.2)
BASOPHILS NFR BLD: 0.3 % (ref 0–1.9)
BILIRUB SERPL-MCNC: 0.3 MG/DL (ref 0.1–1)
BUN SERPL-MCNC: 8 MG/DL (ref 6–20)
CALCIUM SERPL-MCNC: 9.7 MG/DL (ref 8.7–10.5)
CHLORIDE SERPL-SCNC: 106 MMOL/L (ref 95–110)
CO2 SERPL-SCNC: 23 MMOL/L (ref 23–29)
CREAT SERPL-MCNC: 0.8 MG/DL (ref 0.5–1.4)
DIFFERENTIAL METHOD BLD: NORMAL
EOSINOPHIL # BLD AUTO: 0.1 K/UL (ref 0–0.5)
EOSINOPHIL NFR BLD: 1.1 % (ref 0–8)
ERYTHROCYTE [DISTWIDTH] IN BLOOD BY AUTOMATED COUNT: 12.3 % (ref 11.5–14.5)
EST. GFR  (NO RACE VARIABLE): >60 ML/MIN/1.73 M^2
GLUCOSE SERPL-MCNC: 112 MG/DL (ref 70–110)
HBV CORE AB SERPL QL IA: NORMAL
HBV SURFACE AG SERPL QL IA: NORMAL
HCT VFR BLD AUTO: 41.4 % (ref 37–48.5)
HGB BLD-MCNC: 13.5 G/DL (ref 12–16)
IGA SERPL-MCNC: 101 MG/DL (ref 40–350)
IGG SERPL-MCNC: 583 MG/DL (ref 650–1600)
IGM SERPL-MCNC: 60 MG/DL (ref 50–300)
IMM GRANULOCYTES # BLD AUTO: 0.04 K/UL (ref 0–0.04)
IMM GRANULOCYTES NFR BLD AUTO: 0.5 % (ref 0–0.5)
LYMPHOCYTES # BLD AUTO: 2.8 K/UL (ref 1–4.8)
LYMPHOCYTES NFR BLD: 31.4 % (ref 18–48)
MCH RBC QN AUTO: 29.6 PG (ref 27–31)
MCHC RBC AUTO-ENTMCNC: 32.6 G/DL (ref 32–36)
MCV RBC AUTO: 91 FL (ref 82–98)
MONOCYTES # BLD AUTO: 0.6 K/UL (ref 0.3–1)
MONOCYTES NFR BLD: 6.6 % (ref 4–15)
NEUTROPHILS # BLD AUTO: 5.3 K/UL (ref 1.8–7.7)
NEUTROPHILS NFR BLD: 60.1 % (ref 38–73)
NRBC BLD-RTO: 0 /100 WBC
PLATELET # BLD AUTO: 311 K/UL (ref 150–450)
PMV BLD AUTO: 11.5 FL (ref 9.2–12.9)
POTASSIUM SERPL-SCNC: 3.9 MMOL/L (ref 3.5–5.1)
PROT SERPL-MCNC: 7.1 G/DL (ref 6–8.4)
RBC # BLD AUTO: 4.56 M/UL (ref 4–5.4)
SODIUM SERPL-SCNC: 139 MMOL/L (ref 136–145)
WBC # BLD AUTO: 8.76 K/UL (ref 3.9–12.7)

## 2024-12-27 PROCEDURE — 86704 HEP B CORE ANTIBODY TOTAL: CPT | Performed by: CLINICAL NURSE SPECIALIST

## 2024-12-27 PROCEDURE — 36415 COLL VENOUS BLD VENIPUNCTURE: CPT | Performed by: CLINICAL NURSE SPECIALIST

## 2024-12-27 PROCEDURE — 80053 COMPREHEN METABOLIC PANEL: CPT | Performed by: CLINICAL NURSE SPECIALIST

## 2024-12-27 PROCEDURE — 82784 ASSAY IGA/IGD/IGG/IGM EACH: CPT | Performed by: CLINICAL NURSE SPECIALIST

## 2024-12-27 PROCEDURE — 87340 HEPATITIS B SURFACE AG IA: CPT | Performed by: CLINICAL NURSE SPECIALIST

## 2024-12-27 PROCEDURE — 85025 COMPLETE CBC W/AUTO DIFF WBC: CPT | Performed by: CLINICAL NURSE SPECIALIST

## 2024-12-28 ENCOUNTER — PATIENT MESSAGE (OUTPATIENT)
Dept: UROGYNECOLOGY | Facility: CLINIC | Age: 39
End: 2024-12-28
Payer: COMMERCIAL

## 2025-01-28 ENCOUNTER — TELEPHONE (OUTPATIENT)
Dept: NEUROLOGY | Facility: CLINIC | Age: 40
End: 2025-01-28

## 2025-01-28 ENCOUNTER — PATIENT MESSAGE (OUTPATIENT)
Dept: NEUROLOGY | Facility: CLINIC | Age: 40
End: 2025-01-28

## 2025-01-28 ENCOUNTER — OFFICE VISIT (OUTPATIENT)
Dept: NEUROLOGY | Facility: CLINIC | Age: 40
End: 2025-01-28
Payer: COMMERCIAL

## 2025-01-28 VITALS
HEIGHT: 65 IN | BODY MASS INDEX: 30.95 KG/M2 | HEART RATE: 76 BPM | WEIGHT: 185.75 LBS | DIASTOLIC BLOOD PRESSURE: 85 MMHG | SYSTOLIC BLOOD PRESSURE: 121 MMHG

## 2025-01-28 DIAGNOSIS — G35 MULTIPLE SCLEROSIS: Primary | ICD-10-CM

## 2025-01-28 DIAGNOSIS — Z79.899 HIGH RISK MEDICATION USE: ICD-10-CM

## 2025-01-28 DIAGNOSIS — R42 VERTIGO: ICD-10-CM

## 2025-01-28 DIAGNOSIS — Z29.89 PROPHYLACTIC IMMUNOTHERAPY: ICD-10-CM

## 2025-01-28 DIAGNOSIS — H53.9 VISION CHANGES: ICD-10-CM

## 2025-01-28 DIAGNOSIS — Z71.89 COUNSELING REGARDING GOALS OF CARE: ICD-10-CM

## 2025-01-28 DIAGNOSIS — M62.838 MUSCLE SPASM: ICD-10-CM

## 2025-01-28 PROCEDURE — 99215 OFFICE O/P EST HI 40 MIN: CPT | Mod: S$GLB,,, | Performed by: CLINICAL NURSE SPECIALIST

## 2025-01-28 PROCEDURE — 3008F BODY MASS INDEX DOCD: CPT | Mod: CPTII,S$GLB,, | Performed by: CLINICAL NURSE SPECIALIST

## 2025-01-28 PROCEDURE — 99999 PR PBB SHADOW E&M-EST. PATIENT-LVL V: CPT | Mod: PBBFAC,,, | Performed by: CLINICAL NURSE SPECIALIST

## 2025-01-28 PROCEDURE — 3074F SYST BP LT 130 MM HG: CPT | Mod: CPTII,S$GLB,, | Performed by: CLINICAL NURSE SPECIALIST

## 2025-01-28 PROCEDURE — G2211 COMPLEX E/M VISIT ADD ON: HCPCS | Mod: S$GLB,,, | Performed by: CLINICAL NURSE SPECIALIST

## 2025-01-28 PROCEDURE — 1159F MED LIST DOCD IN RCRD: CPT | Mod: CPTII,S$GLB,, | Performed by: CLINICAL NURSE SPECIALIST

## 2025-01-28 PROCEDURE — 3079F DIAST BP 80-89 MM HG: CPT | Mod: CPTII,S$GLB,, | Performed by: CLINICAL NURSE SPECIALIST

## 2025-01-28 RX ORDER — MECLIZINE HYDROCHLORIDE 25 MG/1
25 TABLET ORAL DAILY PRN
Qty: 30 TABLET | Refills: 0 | Status: SHIPPED | OUTPATIENT
Start: 2025-01-28

## 2025-01-28 NOTE — Clinical Note
Let's actually do f/u with JF in June instead of 6 months. I think we should see her the month before her infusion.

## 2025-01-28 NOTE — PROGRESS NOTES
Subjective:          Patient ID: Cari Barillas is a 39 y.o. female who presents today for a routine clinic visit for MS.  She was last seen in May 2024. The history has been provided by the patient.     MS HPI:  DMT: Ocrevus--recently infused; due next in July; has crap gap leading up to infusion, but felt a boost after   Side effects from DMT? No  Taking vitamin D3 as recommended? 50,000 units once a week   Just completed antibiotics for a vaginal infection (BV).   She gets UTIs a few times a year and a URI once a year or so.   She goes to the gym when she can. She plans to start Pilates soon.   She is working full-time--has started working at [employer] as a .  She is having significant dizzy spells. She sometimes feels like she is on a boat; other times, it feels like the room needs to catch up with her when she moves. This is not accompanied by vomiting, but she does have nausea rarely. The dizziness is not daily, but is worse with sudden head movements. This can be debilitating a few days a month to the point where she can not work. She does not have FMLA at her job, but will explore this.   She denies any falls, but had a near miss.   She denies any further confusional episodes. She has some trouble with word finding.     Medications:  Current Outpatient Medications   Medication Sig    buPROPion (WELLBUTRIN XL) 300 MG 24 hr tablet Take 1 tablet (300 mg total) by mouth every morning.    buPROPion (WELLBUTRIN XL) 300 MG 24 hr tablet TAKE 1 TABLET BY MOUTH EVERY MORNING    fluticasone propionate (FLONASE) 50 mcg/actuation nasal spray spray 2 sprays (100 mcg total) by Each Nostril route 2 (two) times a day.    LORazepam (ATIVAN) 1 MG tablet Take 1 tablet by mouth every day as needed    LORazepam (ATIVAN) 1 MG tablet Take 1 tablet by mouth every day as needed for 30 days.    magnesium 250 mg Tab Take 250 mg by mouth once daily.    methocarbamoL (ROBAXIN) 500 MG Tab Take 1 tablet (500 mg  total) by mouth 4 (four) times daily as needed (muscle pain (TMJ)).    methylphenidate HCl (RITALIN) 10 MG tablet Take 1 tablet every day by oral route in the morning.    ocrelizumab (OCREVUS IV) Inject into the vein.    albuterol (VENTOLIN HFA) 90 mcg/actuation inhaler Inhale 2 puffs into the lungs every 6 (six) hours as needed for Wheezing. Rescue (Patient not taking: Reported on 1/28/2025)    gabapentin (NEURONTIN) 300 MG capsule Take 300 mg by mouth 3 (three) times daily. (Patient not taking: Reported on 1/28/2025)    meclizine (ANTIVERT) 25 mg tablet Take 1 tablet (25 mg total) by mouth daily as needed for Dizziness.    methylphenidate HCl (RITALIN) 10 MG tablet Take 1 tablet every day by oral route in the morning. (Patient not taking: Reported on 1/28/2025)    methylphenidate HCl (RITALIN) 10 MG tablet Take 1 tablet every day by oral route in the morning. (Patient not taking: Reported on 1/28/2025)    methylphenidate HCl (RITALIN) 10 MG tablet Take 1 tablet by mouth every day in the morning. (Patient not taking: Reported on 1/28/2025)    methylphenidate HCl (RITALIN) 10 MG tablet Take 1 tablet by mouth every day in the morning. (Patient not taking: Reported on 1/28/2025)    methylphenidate HCl (RITALIN) 10 MG tablet Take 1 tablet every day by oral route in the morning. (Patient not taking: Reported on 1/28/2025)    methylphenidate HCl (RITALIN) 10 MG tablet Take 1 tablet every day by oral route in the morning. (Patient not taking: Reported on 1/28/2025)    promethazine-dextromethorphan (PROMETHAZINE-DM) 6.25-15 mg/5 mL Syrp Take 5 mLs by mouth every 4 (four) hours as needed (cough). (Patient not taking: Reported on 1/28/2025)    WELLBUTRIN  mg 24 hr tablet Take 300 mg by mouth every morning. (Patient not taking: Reported on 1/28/2025)     No current facility-administered medications for this visit.       SOCIAL HISTORY  Social History     Tobacco Use    Smoking status: Every Day     Current packs/day:  0.50     Types: Cigarettes    Smokeless tobacco: Never   Substance Use Topics    Alcohol use: Yes    Drug use: Not Currently     Types: Marijuana, LSD, Cocaine, MDMA (Ecstacy)       ROS:      1/28/25       REVIEW OF SYMPTOMS   Do you feel abnormally tired on most days? Yes    Do you feel you generally sleep well? No    Do you have difficulty controlling your bladder?  No --saw urogynecology    Do you have difficulty controlling your bowels?  No    Do you have frequent muscle cramps, tightness or spasms in your limbs?  Yes --has had some spasms lately; taking magnesium, but not consistently    Do you have new visual symptoms?  No --needs a new referral to optometry; lost her glasses    Do you have worsening difficulty with your memory or thinking? No --as above    Do you have worsening symptoms of anxiety or depression?  No --doing EMDR therapy    For patients who walk, Do you have more difficulty walking?  No    Have you fallen since your last visit?  No    For patients who use wheelchairs: Do you have any skin wounds or breakdown? Not Applicable    Do you have difficulty using your hands?  No --opening packages, like [employer]anda[employer], can be difficult; hands feel numb, but she denies weakness. The dexterity is not as good as it used to be. She is right-handed, and handwriting is stable.    Do you have shooting or burning pain? Yes ==She has burning pain in the right foot, not new. She does not take gabapentin--she does not like how it makes her feel.    Do you have difficulty with sexual function?  Not assessed    If you are sexually active, are you using birth control? Y/N  N/A No    Do you often choke when swallowing liquids or solid food?  No    Do you experience worsening symptoms when overheated? Yes -- sensitive to heat, but not cold; she acclimates to cold easily    Do you need any new equipment such as a wheelchair, walker or shower chair? No    Do you receive co-pay financial assistance for your principal MS  medicine? No    Would you be interested in participating in an MS research trial in the future? Yes    For patients on Gilenya, Tecfidera, Aubagio, Rituxan, Ocrevus, Tysabri, Lemtrada or Methotrexate, are you aware that you should NOT receive live virus vaccines?  Yes    Do you feel you have adequate family/friend support?  Yes    Do you have health insurance?   Yes    Are you currently employed? Yes    Do you receive SSDI/SSI?  Not Applicable    Do you use marijuana or cannabis products? No    Have you been diagnosed with a urinary tract infection since your last visit here? No    Have you been diagnosed with a respiratory tract infection since your last visit here? Yes    Have you been to the emergency room since your last visit here? No    Have you been hospitalized since your last visit here?  No               Objective:        1. 25 foot timed walk:      12/28/2023     2:00 PM 1/28/2025     9:30 AM   Timed 25 Foot Walk:   Did patient wear an AFO? No No   Was assistive device used? No No   Time for 25 Foot Walk (seconds) 3.3 3.7   Time for 25 Foot Walk (seconds) 3.9 3.6       Neurological Exam    Mental Status   Oriented to person, place, and time.   Attention: normal. Concentration: normal.   Speech: speech is normal   Level of consciousness: alert  Knowledge: good.   Normal comprehension.     Cranial Nerves      CN II   Visual acuity: normal     CN III, IV, VI  Extraocular motions are normal.   Visual acuity 20/20 OD and OS without correction.      CN V   Facial sensation intact.      CN VII   Facial expression full, symmetric.      CN VIII   Hearing: intact (to finger rub)     CN IX, X   Palate: symmetric     CN XI   CN XI normal.      CN XII   Tongue deviation: none     Motor Exam   Muscle bulk: normal  Right arm tone: normal  Left arm tone: normal  Right leg tone: increased  Left leg tone: increased     Strength   Right neck flexion: 5/5  Left neck flexion: 5/5  Right neck extension: 5/5  Left neck  extension: 5/5  Right deltoid: 5/5  Left deltoid: 5/5  Right biceps: 5/5  Left biceps: 5/5  Right triceps: 5/5  Left triceps: 5/5  Right wrist flexion: 5/5  Left wrist flexion: 5/5  Right wrist extension: 5/5  Left wrist extension: 5/5  Right interossei: 5/5  Left interossei: 5/5  Right iliopsoas: 5/5  Left iliopsoas: 5/5  Right quadriceps: 5/5  Left quadriceps: 5/5  Right hamstrin/5  Left hamstrin/5  Right anterior tibial: 5/5  Left anterior tibial: 5/5  Right gastroc: 5/5  Left gastroc: 5/5     Sensory Exam   Right arm vibration: normal  Left arm vibration: normal  Right leg vibration: decreased from toes  Left leg vibration: decreased from toes     Gait, Coordination, and Reflexes      Gait  Gait: (short quick steps)     Coordination   Tandem walking coordination: normal     Reflexes   Right brachioradialis: 3+  Left brachioradialis: 3+  Right biceps: 3+  Left biceps: 3+  Right triceps: 3+  Left triceps: 3+  Right patellar: 3+  Left patellar: 3+  Right achilles: 3+  Left achilles: 3+  Right plantar: normal  Left plantar: normal  She can walk on toes and heels.   Normal RSM in UE and LE      Imaging:     Results for orders placed during the hospital encounter of 24    MRI Brain Demyelinating Without Contrast    Impression  No significant change from prior with continued few scattered T2 FLAIR signal hyperintensity foci within the brain parenchyma while nonspecific in light of history remain concerning for mild degree of prior demyelinating plaque burden.    No definite new lesion to suggest significant interval or active demyelination.    Clinical correlation and follow-up advised.      Electronically signed by: Mal Wiggins DO  Date:    2024  Time:    15:20    Results for orders placed during the hospital encounter of 24    MRI Cervical Spine Demyelinating Without Contrast    Impression  No significant change from prior specifically without evidence for cord signal abnormality to  suggest edema or sequela of spinal cord demyelination in light of history.    See above for additional details.      Electronically signed by: Mal Wiggins DO  Date:    11/17/2024  Time:    15:42    Results for orders placed during the hospital encounter of 11/17/24    MRI Thoracic Spine Demyelinating Without Contrast    Impression  No significant change from prior specifically without evidence for cord signal abnormality to suggest edema or sequela of spinal cord demyelination in light of history.    See above for additional details.      Electronically signed by: Mal Wiggins DO  Date:    11/17/2024  Time:    15:42    Images were reviewed with the patient.     Labs:     Lab Results   Component Value Date    [id number] 35 06/30/2023    [id number] 26 (L) 01/18/2022    [id number] 24 (L) 04/14/2021       Lab Results   Component Value Date    WBC 8.76 12/27/2024    RBC 4.56 12/27/2024    HGB 13.5 12/27/2024    HCT 41.4 12/27/2024    MCV 91 12/27/2024    MCH 29.6 12/27/2024    MCHC 32.6 12/27/2024    RDW 12.3 12/27/2024     12/27/2024    MPV 11.5 12/27/2024    GRAN 5.3 12/27/2024    GRAN 60.1 12/27/2024    LYMPH 2.8 12/27/2024    LYMPH 31.4 12/27/2024    MONO 0.6 12/27/2024    MONO 6.6 12/27/2024    EOS 0.1 12/27/2024    BASO 0.03 12/27/2024    EOSINOPHIL 1.1 12/27/2024    BASOPHIL 0.3 12/27/2024     Sodium   Date Value Ref Range Status   12/27/2024 139 136 - 145 mmol/L Final     Potassium   Date Value Ref Range Status   12/27/2024 3.9 3.5 - 5.1 mmol/L Final     Chloride   Date Value Ref Range Status   12/27/2024 106 95 - 110 mmol/L Final     CO2   Date Value Ref Range Status   12/27/2024 23 23 - 29 mmol/L Final     Glucose   Date Value Ref Range Status   12/27/2024 112 (H) 70 - 110 mg/dL Final     BUN   Date Value Ref Range Status   12/27/2024 8 6 - 20 mg/dL Final     Creatinine   Date Value Ref Range Status   12/27/2024 0.8 0.5 - 1.4 mg/dL Final     Calcium   Date Value Ref Range Status   12/27/2024 9.7  8.7 - 10.5 mg/dL Final     Total Protein   Date Value Ref Range Status   12/27/2024 7.1 6.0 - 8.4 g/dL Final     Albumin   Date Value Ref Range Status   12/27/2024 4.1 3.5 - 5.2 g/dL Final     Total Bilirubin   Date Value Ref Range Status   12/27/2024 0.3 0.1 - 1.0 mg/dL Final     Comment:     For infants and newborns, interpretation of results should be based  on gestational age, weight and in agreement with clinical  observations.    Premature Infant recommended reference ranges:  Up to 24 hours.............<8.0 mg/dL  Up to 48 hours............<12.0 mg/dL  3-5 days..................<15.0 mg/dL  6-29 days.................<15.0 mg/dL       Alkaline Phosphatase   Date Value Ref Range Status   12/27/2024 80 40 - 150 U/L Final     AST   Date Value Ref Range Status   12/27/2024 21 10 - 40 U/L Final     ALT   Date Value Ref Range Status   12/27/2024 22 10 - 44 U/L Final     Anion Gap   Date Value Ref Range Status   12/27/2024 10 8 - 16 mmol/L Final     eGFR if    Date Value Ref Range Status   01/18/2022 >60.0 >60 mL/min/1.73 m^2 Final     eGFR if non    Date Value Ref Range Status   01/18/2022 >60.0 >60 mL/min/1.73 m^2 Final     Comment:     Calculation used to obtain the estimated glomerular filtration  rate (eGFR) is the CKD-EPI equation.        Lab Results   Component Value Date    HEPBSAG Non-reactive 12/27/2024    HEPBSAB <3.00 12/23/2022    HEPBSAB Non-reactive 12/23/2022    HEPBCAB Non-reactive 12/27/2024           MS Impression and Plan:     NEURO MULTIPLE SCLEROSIS IMPRESSION:   Number of relapses in the past year?:  0  Clinical Progression:  Clinically Stable  MRI Progression:  Stable  MS Classification:  Relapsing-Remitting MS  Current DMT: ocrelizumab  Current DMT comment: We will continue Ocrevus as long as she does not start getting frequent infections. Her next infusion is due in July. We will check safety labs in June. She is aware of the risks associated with  immunosuppressant therapy, including increased risk of infection. IgG has been low, so we will monitor this closely. We may consider referral to immunology if numbers continue to drop and/or she is getting more frequent infections.     Symptom Management:  Implement change in symptom management  Implement Change in Symptom Management:  Vision and Spasticity  Implement Change in Symptom Management comment: Referral placed to optometry for eye exam. Discussed taking magnesium more regularly--either oxide or glycinate; citrate was giving her diarrhea   Additional Impressions:   Referral placed to vestibular therapy to address vertigo. Rx for meclizine 25mg sent to pharmacy with instructions for her to take this sparingly.   She will follow up with Dr. Becerra in 5 months (ahead of next Ocrevus infusion).   Next brain MRI is due in November.   The visit today is associated with current or anticipated ongoing medical care related to this patient's single serious condition/complex condition of multiple sclerosis.  Total time spent with patient: 45 minutes   Total time spent on encounter: 50 minutes           ROBYN Abreu, CNS    Problem List Items Addressed This Visit          Neurologic Problems    Multiple sclerosis    Overview     Patient on Ocrevus and stable         Relevant Orders    CBC Auto Differential    Comprehensive Metabolic Panel    Hepatitis B Core Antibody, Total    Hepatitis B Surface Antigen    Immunoglobulins (IgG, IgA, IgM) Quantitative    Vitamin D    VITAMIN B12     Other Visit Diagnoses       Vertigo    -  Primary    Relevant Orders    Ambulatory referral/consult to Physical/Occupational Therapy    Vision changes        Relevant Orders    Ambulatory referral/consult to Optometry

## 2025-02-14 ENCOUNTER — ON-DEMAND VIRTUAL (OUTPATIENT)
Dept: URGENT CARE | Facility: CLINIC | Age: 40
End: 2025-02-14

## 2025-02-14 ENCOUNTER — ON-DEMAND VIRTUAL (OUTPATIENT)
Dept: URGENT CARE | Facility: CLINIC | Age: 40
End: 2025-02-14
Payer: COMMERCIAL

## 2025-02-14 DIAGNOSIS — J06.9 URI, ACUTE: Primary | ICD-10-CM

## 2025-02-14 RX ORDER — OSELTAMIVIR PHOSPHATE 75 MG/1
75 CAPSULE ORAL 2 TIMES DAILY
Qty: 10 CAPSULE | Refills: 0 | Status: SHIPPED | OUTPATIENT
Start: 2025-02-14 | End: 2025-02-19

## 2025-02-14 RX ORDER — OSELTAMIVIR PHOSPHATE 75 MG/1
75 CAPSULE ORAL 2 TIMES DAILY
Qty: 10 CAPSULE | Refills: 0 | Status: SHIPPED | OUTPATIENT
Start: 2025-02-14 | End: 2025-02-14 | Stop reason: SDUPTHER

## 2025-02-14 NOTE — PROGRESS NOTES
Subjective:      Patient ID: Cari Barillas is a 39 y.o. female.    Vitals:  vitals were not taken for this visit.     Chief Complaint: URI      Visit Type: TELE AUDIOVISUAL - This visit was conducted virtually based on  subjective information and limited objective exam    Present with the patient at the time of consultation: TELEMED PRESENT WITH PATIENT: None  LOCATION OF PATIENT [place]  Two patient identifiers used to verify patient- saying out date of birth and full name.       Past Medical History:   Diagnosis Date    Abnormal Pap smear of cervix     Anxiety     Depression     H/O LEEP     Multiple sclerosis      Past Surgical History:   Procedure Laterality Date    CERVICAL BIOPSY  W/ LOOP ELECTRODE EXCISION  2012    COLONOSCOPY N/A 10/25/2022    Procedure: COLONOSCOPY;  Surgeon: Janie Fleming MD;  Location: [facility];  Service: Endoscopy;  Laterality: N/A;    ESOPHAGOGASTRODUODENOSCOPY N/A 10/25/2022    Procedure: EGD (ESOPHAGOGASTRODUODENOSCOPY);  Surgeon: Janie Fleming MD;  Location: [facility];  Service: Endoscopy;  Laterality: N/A;    TONSILLECTOMY       Review of patient's allergies indicates:   Allergen Reactions    Gluten protein     Soy      Current Outpatient Medications on File Prior to Visit   Medication Sig Dispense Refill    albuterol (VENTOLIN HFA) 90 mcg/actuation inhaler Inhale 2 puffs into the lungs every 6 (six) hours as needed for Wheezing. Rescue (Patient not taking: Reported on 1/28/2025) 18 g 2    buPROPion (WELLBUTRIN XL) 300 MG 24 hr tablet Take 1 tablet (300 mg total) by mouth every morning. 30 tablet 11    buPROPion (WELLBUTRIN XL) 300 MG 24 hr tablet TAKE 1 TABLET BY MOUTH EVERY MORNING 30 tablet 11    buPROPion (WELLBUTRIN XL) 300 MG 24 hr tablet TAKE 1 TABLET BY MOUTH EVERY MORNING 90 tablet 3    fluticasone propionate (FLONASE) 50 mcg/actuation nasal spray spray 2 sprays (100 mcg total) by Each Nostril route 2 (two) times a day. 16 g 2    gabapentin  (NEURONTIN) 300 MG capsule Take 300 mg by mouth 3 (three) times daily. (Patient not taking: Reported on 1/28/2025)      LORazepam (ATIVAN) 1 MG tablet Take 1 tablet by mouth every day as needed 30 tablet 1    LORazepam (ATIVAN) 1 MG tablet Take 1 tablet by mouth every day as needed for 30 days. 30 tablet 1    LORazepam (ATIVAN) 1 MG tablet Take 1 tablet every day by oral route as needed for 30 days. 30 tablet 1    magnesium 250 mg Tab Take 250 mg by mouth once daily.      meclizine (ANTIVERT) 25 mg tablet Take 1 tablet (25 mg total) by mouth daily as needed for Dizziness. 30 tablet 0    methocarbamoL (ROBAXIN) 500 MG Tab Take 1 tablet (500 mg total) by mouth 4 (four) times daily as needed (muscle pain (TMJ)). 30 tablet 2    methylphenidate HCl (RITALIN) 10 MG tablet Take 1 tablet every day by oral route in the morning. (Patient not taking: Reported on 1/28/2025) 30 tablet 0    methylphenidate HCl (RITALIN) 10 MG tablet Take 1 tablet every day by oral route in the morning. (Patient not taking: Reported on 1/28/2025) 30 tablet 0    methylphenidate HCl (RITALIN) 10 MG tablet Take 1 tablet by mouth every day in the morning. (Patient not taking: Reported on 1/28/2025) 30 tablet 0    methylphenidate HCl (RITALIN) 10 MG tablet Take 1 tablet by mouth every day in the morning. (Patient not taking: Reported on 1/28/2025) 30 tablet 0    methylphenidate HCl (RITALIN) 10 MG tablet Take 1 tablet every day by oral route in the morning. (Patient not taking: Reported on 1/28/2025) 30 tablet 0    methylphenidate HCl (RITALIN) 10 MG tablet Take 1 tablet every day by oral route in the morning. (Patient not taking: Reported on 1/28/2025) 30 tablet 0    methylphenidate HCl (RITALIN) 10 MG tablet Take 1 tablet every day by oral route in the morning. 30 tablet 0    [START ON 2/26/2025] methylphenidate HCl (RITALIN) 10 MG tablet Take 1 tablet every day by oral route in the morning. 30 tablet 0    methylphenidate HCl (RITALIN) 10 MG tablet  Take 1 tablet every day by oral route in the morning. 30 tablet 0    [START ON 3/26/2025] methylphenidate HCl (RITALIN) 10 MG tablet Take 1 tablet every day by oral route in the morning. 30 tablet 0    ocrelizumab (OCREVUS IV) Inject into the vein.      promethazine-dextromethorphan (PROMETHAZINE-DM) 6.25-15 mg/5 mL Syrp Take 5 mLs by mouth every 4 (four) hours as needed (cough). (Patient not taking: Reported on 1/28/2025) 118 mL 0    WELLBUTRIN  mg 24 hr tablet Take 300 mg by mouth every morning. (Patient not taking: Reported on 1/28/2025)       No current facility-administered medications on file prior to visit.     Family History   Problem Relation Name Age of Onset    Colon cancer Maternal Grandmother  78    Diabetes Maternal Grandmother      Diabetes Mother         Medications Ordered                [facility]   [address] [id number]    Telephone: [phone]   Fax: [phone]   Hours: Mon-Fri, 8a-5:30p                         Internal Pharmacy (1 of 1)              oseltamivir (TAMIFLU) 75 MG capsule    Sig: Take 1 capsule (75 mg total) by mouth 2 (two) times daily. for 5 days       Start: 2/14/25     Quantity: 10 capsule Refills: 0                           Ohs Peq Odvv Intake    2/14/2025 11:08 AM CST - Filed by Patient   What is your current physical address in the event of a medical emergency? [address] [place] [id number]   Are you able to take your vital signs? No   Please attach any relevant images or files    Is your employer contracted with [facility]? No         40 yo female with c/o sore throat, congestion, fever 99 last night. She states symptoms started yesterday. She states some coughing and congestion. She denies sob but states tightness in chest. She states she has tried mucinex and vitamin c.         Constitution: Positive for fever.   HENT:  Positive for ear pain, congestion and sore throat.    Cardiovascular: Negative.     Respiratory: Negative.     Gastrointestinal: Negative.    Endocrine: negative.   Genitourinary: Negative.  Negative for frequency and urgency.   Musculoskeletal: Negative.    Skin: Negative.    Allergic/Immunologic: Negative.    Neurological:  Positive for headaches.   Hematologic/Lymphatic: Negative.    Psychiatric/Behavioral: Negative.          Objective:   The physical exam was conducted virtually.    AAO x 3 ; no acute distress noted; appearance normal; mood and behavior normal; thought process normal  Head- normocephalic  Nose- appears normal, no discharge or erythema  Eyes- pupils appear normal in size, no drainage, no erythema  Ears- normal appearing; no discharge, no erythema  Mouth- appears normal  Oropharynx- no erythema, lesions  Lungs- breathing at a normal rate, no acute distress noted  Heart- no reports of tachycardia, palpitations, chest pain  Abdomen- non distended, non tender reported by patient  Skin- warm and dry, no erythema or edema noted by patient or visualized  Psych- as above; no si/hi      Assessment:     1. URI, acute        Plan:     PLEASE READ YOUR DISCHARGE INSTRUCTIONS ENTIRELY AS IT CONTAINS IMPORTANT INFORMATION.      Please drink plenty of fluids.    Please get plenty of rest.    Please return here or go to the Emergency Department for any concerns or worsening of condition.    Please take an over the counter antihistamine medication (allegra/Claritin/Zyrtec) of your choice as directed.    Try an over the counter decongestant like Mucinex D or Sudafed. You buy this behind the pharmacy counter    If you do have Hypertension or palpitations, it is safe to take Coricidin HBP for relief of sinus symptoms.    If not allergic, please take over the counter Tylenol (Acetaminophen) and/or Motrin (Ibuprofen) as directed for control of pain and/or fever.  Please follow up with your primary care doctor or specialist as needed.    Sore throat recommendations: Warm fluids, warm salt water  gargles, throat lozenges, tea, honey, soup, rest, hydration.    Use over the counter flonase: one spray each nostril twice daily OR two sprays each nostril once daily.     Sinus rinses DO NOT USE TAP WATER, if you must, water must be a rolling boil for 1 minute, let it cool, then use.  May use distilled water, or over the counter nasal saline rinses.  Vics vapor rub in shower to help open nasal passages.  May use nasal gel to keep passages moisturized.  May use Nasal saline sprays during the day for added relief of congestion.   For those who go to the gym, please do not use the sauna or steam room now to clear sinuses.    If you  smoke, please stop smoking.      Please return or see your primary care doctor if you develop new or worsening symptoms.     Please arrange follow up with your primary medical clinic as soon as possible. You must understand that you've received Virtual treatment only and that you may be released before all of your medical problems are known or treated. You, the patient, will arrange for follow up as instructed. If your symptoms worsen or fail to improve you should go to the Emergency Room.      Thank you for choosing [facility]!    Our goal in the [facility] is to always provide outstanding medical care. You may receive a survey by mail or e-mail in the next week regarding your experience today. We would greatly appreciate you completing and returning the survey. Your feedback provides us with a way to recognize our staff who provide very good care, and it helps us learn how to improve when your experience was below our aspiration of excellence.         We appreciate you trusting us with your medical care. We hope you feel better soon. We will be happy to take care of you for all of your future medical needs.    You must understand that you've received an Urgent Care treatment only and that you may be released before all your medical problems are known  or treated. You, the patient, will arrange for follow up care as instructed.    Follow up with your PCP or specialty clinic as directed in the next 1-2 weeks if not improved or as needed.  You can call [phone] to schedule an appointment with the appropriate provider.    If your condition worsens we recommend that you receive another evaluation in person, with your primary care provider, urgent care or at the emergency room immediately or contact your primary medical clinics after hours call service to discuss your concerns.         URI, acute  -     oseltamivir (TAMIFLU) 75 MG capsule; Take 1 capsule (75 mg total) by mouth 2 (two) times daily. for 5 days  Dispense: 10 capsule; Refill: 0

## 2025-02-14 NOTE — LETTER
February 14, 2025    Cari Barillas  [address] [address]             Virtual Visit - Urgent Care  Urgent Care  [address] [address]   February 14, 2025     Patient: Cari Barillas   YOB: 1985   Date of Visit: 2/14/2025       To Whom it May Concern:    Cari Barillas was seen virtually on 2/14/2025. She may return to work on 2/17/2025 .    Please excuse her from any classes or work missed.    If you have any questions or concerns, please don't hesitate to call.    Sincerely,           Ingrid Flowers, TEMOP

## 2025-02-15 NOTE — PROGRESS NOTES
Pt seen earlier today with strong suspicion for influenza, fell asleep and pharmacy now closed.  Would like tamiflu sent to [facility].  This was done.  No LOS

## 2025-03-05 ENCOUNTER — ON-DEMAND VIRTUAL (OUTPATIENT)
Dept: URGENT CARE | Facility: CLINIC | Age: 40
End: 2025-03-05
Payer: COMMERCIAL

## 2025-03-05 DIAGNOSIS — R05.9 COUGH, UNSPECIFIED TYPE: ICD-10-CM

## 2025-03-05 DIAGNOSIS — R09.81 NASAL CONGESTION: Primary | ICD-10-CM

## 2025-03-05 RX ORDER — AZITHROMYCIN 250 MG/1
TABLET, FILM COATED ORAL
Qty: 6 TABLET | Refills: 0 | Status: SHIPPED | OUTPATIENT
Start: 2025-03-05

## 2025-03-05 RX ORDER — PROMETHAZINE HYDROCHLORIDE AND DEXTROMETHORPHAN HYDROBROMIDE 6.25; 15 MG/5ML; MG/5ML
5 SYRUP ORAL NIGHTLY PRN
Qty: 35 ML | Refills: 0 | Status: SHIPPED | OUTPATIENT
Start: 2025-03-05 | End: 2025-03-13

## 2025-03-05 RX ORDER — PREDNISONE 10 MG/1
10 TABLET ORAL 2 TIMES DAILY
Qty: 6 TABLET | Refills: 0 | Status: SHIPPED | OUTPATIENT
Start: 2025-03-05 | End: 2025-03-09

## 2025-03-05 RX ORDER — AZELASTINE 1 MG/ML
2 SPRAY, METERED NASAL 2 TIMES DAILY
Qty: 30 ML | Refills: 0 | Status: SHIPPED | OUTPATIENT
Start: 2025-03-05 | End: 2026-03-05

## 2025-03-05 RX ORDER — BENZONATATE 100 MG/1
100 CAPSULE ORAL 3 TIMES DAILY PRN
Qty: 60 CAPSULE | Refills: 0 | Status: SHIPPED | OUTPATIENT
Start: 2025-03-05 | End: 2025-03-26

## 2025-03-05 NOTE — PROGRESS NOTES
Subjective:      Patient ID: Cari Barillas is a 39 y.o. female.    Vitals:  vitals were not taken for this visit.     Chief Complaint: Cough      Visit Type: TELE AUDIOVISUAL    Patient Location: Home - in car    Present with the patient at the time of consultation: TELEMED PRESENT WITH PATIENT: None    Past Medical History:   Diagnosis Date    Abnormal Pap smear of cervix     Anxiety     Depression     H/O LEEP     Multiple sclerosis      Past Surgical History:   Procedure Laterality Date    CERVICAL BIOPSY  W/ LOOP ELECTRODE EXCISION      COLONOSCOPY N/A 10/25/2022    Procedure: COLONOSCOPY;  Surgeon: Janie Fleming MD;  Location: [facility];  Service: Endoscopy;  Laterality: N/A;    ESOPHAGOGASTRODUODENOSCOPY N/A 10/25/2022    Procedure: EGD (ESOPHAGOGASTRODUODENOSCOPY);  Surgeon: Janie Fleming MD;  Location: [facility];  Service: Endoscopy;  Laterality: N/A;    TONSILLECTOMY       Review of patient's allergies indicates:   Allergen Reactions    Gluten protein     Soy      Medications Ordered Prior to Encounter[1]  Family History   Problem Relation Name Age of Onset    Colon cancer Maternal Grandmother  78    Diabetes Maternal Grandmother      Diabetes Mother         Medications Ordered                [facility]   [address] [address]    Telephone: [phone]   Fax: [phone]   Hours: Mon-Fri, 8a-5:30p                         Internal Pharmacy ()              azelastine (ASTELIN) 137 mcg (0.1 %) nasal spray    Si sprays (274 mcg total) by Nasal route 2 (two) times daily.       Start: 3/5/25     Quantity: 30 mL Refills: 0                         azithromycin (Z-LUZ) 250 MG tablet    Sig: Take 2 tablets by mouth on day 1; Take 1 tablet by mouth on days 2-5       Start: 3/5/25     Quantity: 6 tablet Refills: 0                         benzonatate (TESSALON) 100 MG capsule    Sig: Take 1 capsule (100 mg total) by mouth 3 (three) times daily as  needed for Cough. May take 1-2 caps 3 times daily as needed.       Start: 3/5/25     Quantity: 60 capsule Refills: 0                         predniSONE (DELTASONE) 10 MG tablet    Sig: Take 1 tablet (10 mg total) by mouth 2 (two) times daily. for 3 days       Start: 3/5/25     Quantity: 6 tablet Refills: 0                         promethazine-dextromethorphan (PROMETHAZINE-DM) 6.25-15 mg/5 mL Syrp    Sig: Take 5 mLs by mouth nightly as needed (cough).       Start: 3/5/25     Quantity: 35 mL Refills: 0                           Ohs Peq Odvv Intake    3/5/2025  1:39 PM CST - Filed by Patient   What is your current physical address in the event of a medical emergency? [address]   Are you able to take your vital signs? No   Please attach any relevant images or files    Is your employer contracted with [facility]? No         Cough for 1 month, with congestion and ear fullness. Not taking anything currently. Seeking further treatment options.    Cough  Pertinent negatives include no chills, ear pain (fullness), fever, headaches, sore throat, shortness of breath or wheezing.       Constitution: Negative for chills and fever.   HENT:  Positive for congestion. Negative for ear pain (fullness), sinus pain, sinus pressure and sore throat.    Respiratory:  Positive for cough. Negative for shortness of breath and wheezing.    Gastrointestinal:  Negative for nausea, vomiting and diarrhea.   Neurological:  Negative for headaches.        Objective:   The physical exam was conducted virtually.  Physical Exam   Constitutional: She is oriented to person, place, and time. She does not appear ill. No distress.   HENT:   Head: Normocephalic and atraumatic.   Nose: Nose normal.   Eyes: Extraocular movement intact   Pulmonary/Chest: Effort normal.   Abdominal: Normal appearance.   Musculoskeletal: Normal range of motion.         General: Normal range of motion.   Neurological: no focal deficit. She is alert and oriented to  person, place, and time.   Psychiatric: Her behavior is normal. Mood normal.   Vitals reviewed.      Assessment:     1. Nasal congestion    2. Cough, unspecified type        Plan:     Patient encouraged to monitor symptoms closely and instructed to follow-up for new or worsening symptoms. Further, in-person, evaluation may be necessary for continued treatment. Please follow up with your primary care doctor or specialist as needed. Verbally discussed plan. Patient confirms understanding and is in agreement with treatment and plan.     You must understand that you've received a [facility] Care evaluation only and that you may be released before all your medical problems are known or treated. You, the patient, will arrange for follow up care as instructed.    Nasal congestion  -     azelastine (ASTELIN) 137 mcg (0.1 %) nasal spray; 2 sprays (274 mcg total) by Nasal route 2 (two) times daily.  Dispense: 30 mL; Refill: 0    Cough, unspecified type  -     predniSONE (DELTASONE) 10 MG tablet; Take 1 tablet (10 mg total) by mouth 2 (two) times daily. for 3 days  Dispense: 6 tablet; Refill: 0  -     benzonatate (TESSALON) 100 MG capsule; Take 1 capsule (100 mg total) by mouth 3 (three) times daily as needed for Cough. May take 1-2 caps 3 times daily as needed.  Dispense: 60 capsule; Refill: 0  -     promethazine-dextromethorphan (PROMETHAZINE-DM) 6.25-15 mg/5 mL Syrp; Take 5 mLs by mouth nightly as needed (cough).  Dispense: 35 mL; Refill: 0  -     azithromycin (Z-LUZ) 250 MG tablet; Take 2 tablets by mouth on day 1; Take 1 tablet by mouth on days 2-5  Dispense: 6 tablet; Refill: 0      Patient Instructions   OVER THE COUNTER RECOMMENDATIONS/SUGGESTIONS (IF NO CONTRAINDICATIONS).     ·         Make sure to stay well hydrated.     ·         Use Nasal Saline to mechanically move any post nasal drip from your eustachian tube or from the back of your throat.     ·         Use warm saltwater gargles to ease your throat pain. Warm  saltwater gargles as needed for sore throat-  1/2 tsp salt to 1 cup warm water, gargle as desired. Warm fluids tend to relieve a sore throat.     .         Throat lozenges, Chloraseptic spray or other over the counter treatments are ok to use as well. Use as directed.     ·         Use an antihistamine such as Claritin, Zyrtec or Allegra to dry you out.     ·         Use pseudoephedrine (behind the counter) to decongest. Pseudoephedrine  30 mg up to 240 mg /day. It can raise your blood pressure and give you palpitations.     ·         Use Mucinex (guaifenesin) to break up mucous up to 2400mg/day to loosen any mucous.     ·         The Mucinex DM pill has a cough suppressant that can be sedating. It can be used at night to stop the tickle at the back of your throat.     ·         You can use Mucinex D (it has guaifenesin and a high dose of pseudoephedrine) in the mornings to help decongest.     ·         Use Afrin (oxymetazoline) in each nare for no longer than 3 days, as it is addictive. It can also dry out your mucous membranes and cause elevated blood pressure. This is especially useful if you are flying.     ·         Use Flonase 1-2 sprays/nostril per day. It is a local acting steroid nasal spray, if you develop a bloody nose, stop using the medication immediately.     ·         Sometimes Nyquil at night is beneficial to help you get some rest, however it is sedating, and it does have an antihistamine, and Tylenol.     ·         Honey is a natural cough suppressant that can be used.     ·         Tylenol up to 4,000 mg a day is safe for short periods and can be used for body aches, pain, and fever. However, in high doses and prolonged use it can cause liver irritation.     ·         Ibuprofen is a non-steroidal anti-inflammatory that can be used for body aches, pain, and fever. However, it can also cause stomach irritation if overused.                             [1]   Current Outpatient Medications on File  Prior to Visit   Medication Sig Dispense Refill    albuterol (VENTOLIN HFA) 90 mcg/actuation inhaler Inhale 2 puffs into the lungs every 6 (six) hours as needed for Wheezing. Rescue (Patient not taking: Reported on 1/28/2025) 18 g 2    buPROPion (WELLBUTRIN XL) 300 MG 24 hr tablet Take 1 tablet (300 mg total) by mouth every morning. 30 tablet 11    buPROPion (WELLBUTRIN XL) 300 MG 24 hr tablet TAKE 1 TABLET BY MOUTH EVERY MORNING 30 tablet 11    buPROPion (WELLBUTRIN XL) 300 MG 24 hr tablet TAKE 1 TABLET BY MOUTH EVERY MORNING 90 tablet 3    fluticasone propionate (FLONASE) 50 mcg/actuation nasal spray spray 2 sprays (100 mcg total) by Each Nostril route 2 (two) times a day. 16 g 2    gabapentin (NEURONTIN) 300 MG capsule Take 300 mg by mouth 3 (three) times daily. (Patient not taking: Reported on 1/28/2025)      LORazepam (ATIVAN) 1 MG tablet Take 1 tablet by mouth every day as needed 30 tablet 1    LORazepam (ATIVAN) 1 MG tablet Take 1 tablet by mouth every day as needed for 30 days. 30 tablet 1    LORazepam (ATIVAN) 1 MG tablet Take 1 tablet every day by oral route as needed for 30 days. 30 tablet 1    magnesium 250 mg Tab Take 250 mg by mouth once daily.      meclizine (ANTIVERT) 25 mg tablet Take 1 tablet (25 mg total) by mouth daily as needed for Dizziness. 30 tablet 0    methocarbamoL (ROBAXIN) 500 MG Tab Take 1 tablet (500 mg total) by mouth 4 (four) times daily as needed (muscle pain (TMJ)). 30 tablet 2    methylphenidate HCl (RITALIN) 10 MG tablet Take 1 tablet every day by oral route in the morning. (Patient not taking: Reported on 1/28/2025) 30 tablet 0    methylphenidate HCl (RITALIN) 10 MG tablet Take 1 tablet every day by oral route in the morning. (Patient not taking: Reported on 1/28/2025) 30 tablet 0    methylphenidate HCl (RITALIN) 10 MG tablet Take 1 tablet by mouth every day in the morning. (Patient not taking: Reported on 1/28/2025) 30 tablet 0    methylphenidate HCl (RITALIN) 10 MG tablet Take  1 tablet by mouth every day in the morning. (Patient not taking: Reported on 1/28/2025) 30 tablet 0    methylphenidate HCl (RITALIN) 10 MG tablet Take 1 tablet every day by oral route in the morning. (Patient not taking: Reported on 1/28/2025) 30 tablet 0    methylphenidate HCl (RITALIN) 10 MG tablet Take 1 tablet every day by oral route in the morning. (Patient not taking: Reported on 1/28/2025) 30 tablet 0    methylphenidate HCl (RITALIN) 10 MG tablet Take 1 tablet every day by oral route in the morning. 30 tablet 0    methylphenidate HCl (RITALIN) 10 MG tablet Take 1 tablet every day by oral route in the morning. 30 tablet 0    methylphenidate HCl (RITALIN) 10 MG tablet Take 1 tablet every day by oral route in the morning. 30 tablet 0    [START ON 3/26/2025] methylphenidate HCl (RITALIN) 10 MG tablet Take 1 tablet every day by oral route in the morning. 30 tablet 0    ocrelizumab (OCREVUS IV) Inject into the vein.      WELLBUTRIN  mg 24 hr tablet Take 300 mg by mouth every morning. (Patient not taking: Reported on 1/28/2025)      [DISCONTINUED] promethazine-dextromethorphan (PROMETHAZINE-DM) 6.25-15 mg/5 mL Syrp Take 5 mLs by mouth every 4 (four) hours as needed (cough). (Patient not taking: Reported on 1/28/2025) 118 mL 0     No current facility-administered medications on file prior to visit.

## 2025-03-07 ENCOUNTER — PATIENT MESSAGE (OUTPATIENT)
Dept: PSYCHIATRY | Facility: CLINIC | Age: 40
End: 2025-03-07
Payer: COMMERCIAL

## 2025-03-20 ENCOUNTER — OFFICE VISIT (OUTPATIENT)
Dept: DERMATOLOGY | Facility: CLINIC | Age: 40
End: 2025-03-20
Payer: COMMERCIAL

## 2025-03-20 DIAGNOSIS — D18.01 ANGIOMA OF SKIN: ICD-10-CM

## 2025-03-20 DIAGNOSIS — L81.4 LENTIGO: ICD-10-CM

## 2025-03-20 DIAGNOSIS — L71.9 ROSACEA: Primary | ICD-10-CM

## 2025-03-20 DIAGNOSIS — I78.1 TELANGIECTASIA: ICD-10-CM

## 2025-03-20 DIAGNOSIS — D22.9 MULTIPLE BENIGN NEVI: ICD-10-CM

## 2025-03-20 DIAGNOSIS — D23.9 DERMATOFIBROMA: ICD-10-CM

## 2025-03-20 DIAGNOSIS — Z12.83 SCREENING EXAM FOR SKIN CANCER: ICD-10-CM

## 2025-03-20 RX ORDER — TRETINOIN 0.25 MG/G
CREAM TOPICAL
Qty: 30 G | Refills: 5 | Status: SHIPPED | OUTPATIENT
Start: 2025-03-20

## 2025-03-20 RX ORDER — AZELAIC ACID 0.15 G/G
GEL TOPICAL
Qty: 30 G | Refills: 5 | Status: SHIPPED | OUTPATIENT
Start: 2025-03-20

## 2025-03-20 NOTE — PATIENT INSTRUCTIONS
RETINOIDS   Your Doctor has prescribed a topical retinoid for your skin.  A retinoid is a vitamin A-derived product used to treat a variety of skin conditions including acne, actinic keratoses (pre-skin cancers), uneven pigmentation from sun damage, fine lines and wrinkles, and enlarged pores.      How do they work?   Retinoids increase skin cell turn over from the normal 30 days to five or six days, minimizing clogged pores--the major factor in acne.  Retinoids can also repair the DNA in cells damaged by the sun, helping to even out skin pigmentation and clear pre-skin cancers.  They stimulate collagen remodeling and repair, reversing signs of maturing skin.   They can shrink oil glands and minimize the appearance of large pores. These effects can not be appreciated unless the medication is used on a consistent basis!    How do I use a retinoid?   After washing with a mild cleanser (CeraVe, Cetaphil, Neutrogena, Purpose), the skin should be moisturized with a non-retinol containing moisturizer such as Cerave cream or PM lotion. Then, a thin layer of medication is applied to the forehead, nose, cheeks, and chin (and around eyes if treating fine wrinkles) at night.  The amount of medication needed to cover the entire face should be no more than the size of a green pea. Irritation around the eyes can be treated with Vaseline at night.     What if my skin appears dry, red, and is peeling?   Retinoids do not cause dry skin but rather they cause the top layer of the skin to shed, giving an appearance of dry skin.  In fact, new healthy skin cells are replacing the older, damaged cells on the surface. This usually occurs the first 2-4 weeks as the skin is adjusting to the medication.  It is reasonable to use the medication every other night or even every three nights until your skin adjusts.  You can use a MILD exfoliant to remove the peeling skin (Aveeno daily clarifying pads, Aqua glycolic face cream) and can apply a  moisturizer throughout the day as needed. Retinoids come in a variety of strengths and vehicles, and your doctor can find one best for you.  If you cannot tolerate prescription strength retinoids, over the counter products with retinol may be beneficial (Olay ProX wrinkle cream, MADELINE deep wrinkle cream, Green Cream at [employer])    Will my skin be more sensitive in the sun?   You will need to use a sunscreen with SPF 30 daily.  Retinoids will cause the outermost layer of the skin to be thinner and thus more sensitive to ultraviolet rays.  However, remember that over time, retinoids actually make the skin thicker by enhancing collagen deposition which protects the skin from sun damage.     When will I see results?   If you are using a retinoid for acne, you should see some improvement in 6-8 weeks.  Do not be alarmed if you find that your acne gets WORSE before it gets better- KEEP USING THE MEDICATION- this is a normal response and your acne will improve if you can stick with it.     If you are using the medication for anti-aging and skin dyspigmentation, you may see results in 3 months, but most effects are not visible until 6 months.  Retinoids are clinically proven to reverse signs of aging, but only if used on a CONSISTENT BASIS!   *Remember that retinoids should not be used if you are pregnant.  *Discontinue use 1 week prior to waxing, as skin is more likely to tear.

## 2025-03-20 NOTE — PROGRESS NOTES
"  Patient Information  Name: Cari Barillas  : 1985  MRN: [id number]     Referring Physician:  Self   Primary Care Physician:  Gurwinder Michael MD   Date of Visit: 3/20/25      Subjective:     History of Present lllness:    Cari Barillas is a 39 y.o. female who presents with a chief complaint of moles.  Patient is here today for a "mole" check. She has several red spots on her skin that she would like examined.    Pt has a history of moderate sun exposure in the past.   Pt recalls several blistering sunburns in the past: yes  Pt has history of tanning bed use: no  Pt has had moles removed in the past: no  Pt has personal history of skin cancer: no  Pt has family history of melanoma in first degree relatives: no    Clinical documentation obtained by nursing staff reviewed.    Review of Systems    Objective:   Physical Exam   Constitutional: She appears well-developed and well-nourished. No distress.   Neurological: She is alert and oriented to person, place, and time. She is not disoriented.   Psychiatric: She has a normal mood and affect.   Skin:   Areas Examined (abnormalities noted in diagram):   Head / Face Inspection Performed  Neck Inspection Performed  Chest / Axilla Inspection Performed  Abdomen Inspection Performed  Genitals / Buttocks / Groin Inspection Performed  Back Inspection Performed  RUE Inspected  LUE Inspection Performed  RLE Inspected  LLE Inspection Performed  Nails and Digits Inspection Performed                 Diagram Legend     Erythematous scaling macule/papule c/w actinic keratosis       Vascular papule c/w angioma      Pigmented verrucoid papule/plaque c/w seborrheic keratosis      Yellow umbilicated papule c/w sebaceous hyperplasia      Irregularly shaped tan macule c/w lentigo     1-2 mm smooth white papules consistent with Milia      Movable subcutaneous cyst with punctum c/w epidermal inclusion cyst      Subcutaneous movable cyst c/w pilar cyst      Firm pink to brown " papule c/w dermatofibroma      Pedunculated fleshy papule(s) c/w skin tag(s)      Evenly pigmented macule c/w junctional nevus     Mildly variegated pigmented, slightly irregular-bordered macule c/w mildly atypical nevus      Flesh colored to evenly pigmented papule c/w intradermal nevus       Pink pearly papule/plaque c/w basal cell carcinoma      Erythematous hyperkeratotic cursted plaque c/w SCC      Surgical scar with no sign of skin cancer recurrence      Open and closed comedones      Inflammatory papules and pustules      Verrucoid papule consistent consistent with wart     Erythematous eczematous patches and plaques     Dystrophic onycholytic nail with subungual debris c/w onychomycosis     Umbilicated papule    Erythematous-base heme-crusted tan verrucoid plaque consistent with inflamed seborrheic keratosis     Erythematous Silvery Scaling Plaque c/w Psoriasis     See annotation    No images are attached to the encounter or orders placed in the encounter.      [] Data reviewed  [] Prior external notes reviewed  [] Independent review of test  [] Management discussed with another provider  [] Independent historian    Assessment / Plan:        Rosacea  - chronic problem, not at treatment goal  Rosacea is a chronic condition without a definitive cure.  There are several well-known triggers, such as exercise, temperature extremes, alcohol, and spicy foods, that should be avoided to prevent a rosacea flare.  Use gentle, ceramide-containing products (such as CeraVe Moisturizing Cream or La Roche-Posay Toleriane Double Repair Face Moisturizer) to repair the skin barrier and to minimize irritation. Avoid harsh soaps, exfoliants, and scrubs.  -     azelaic acid (AZELEX) 15 % gel; Compound Azelaic Acid 15% / Metronidazole 1% / Ivermectin 1% Cream. Apply to face once or twice daily.  Dispense: 30 g; Refill: 5  -     tretinoin (RETIN-A) 0.025 % cream; Compound Tretinoin 0.025% / Niacinamide 2% / Azelaic Acid 8% / Sodium  Hyaluronate 0.25% Cream. Apply a pea-sized amount to entire face qhs.  Dispense: 30 g; Refill: 5  Topical retinoids may make the skin dry, red, and irritated. You may moisturize daily with a non-comedogenic moisturizer. If still dry, would recommend using the retinoid every other night or every third night as tolerated. Avoid using around corners of eyes, nose, and mouth.  Recommend using a daily broad-spectrum sunscreen with SPF of 30 or higher. Seek shade and wear sun-protective clothing/hat.  Written instructions are provided in the Visit Summary.    Telangiectasia  Reassurance was given to the patient. No treatment is necessary.  If treatment is desired, recommended and discussed risks, benefits, alternatives, and side effects of pulse dye laser (PDL).    Angioma of skin  These are benign vascular lesions that are inherited. Treatment is not necessary.  If treatment is desired, recommended and discussed risks, benefits, alternatives, and side effects of pulse dye laser (PDL).    Lentigo  These are benign sun spots which should be monitored for changes. Daily sun protection will reduce the number of new lesions.   Recommend using a broad-spectrum, water-resistant sunscreen with SPF of 30 or higher--reapply every 2 hours. Seek shade, wear sun-protective clothing, and perform regular skin self-exams.    Multiple benign nevi  Multiple benign-appearing nevi present on exam today. Reassurance provided. Counseled patient to periodically examine moles and return to clinic if any changes in size, shape, or color are noted or if it becomes symptomatic (bleeding, itching, pain, etc).  Recommend using a broad-spectrum, water-resistant sunscreen with SPF of 30 or higher--reapply every 2 hours. Seek shade, wear sun-protective clothing, and perform regular skin self-exams.    Dermatofibroma  These growths are benign bundles of scar tissue that can arise spontaneously or from minor trauma, such as a bug bite or a shaving nick.  They are commonly found on the lower legs, arms above the elbows, and trunk.   Removal is not recommended as the lesion is just replaced with an additional scar; however, if it is symptomatic, removal can be considered. Lesions can also recur following removal.    Screening exam for skin cancer  Total body skin examination performed today as noted in physical exam. No lesions suspicious for malignancy were seen.  Recommend using a broad-spectrum, water-resistant sunscreen with SPF of 30 or higher--reapply every 2 hours. Seek shade, wear sun-protective clothing, and perform regular skin self-exams.       Follow up in about 3 months (around 6/20/2025).      Nohemi Russo MD, FAAD  [facility]

## 2025-04-15 ENCOUNTER — OFFICE VISIT (OUTPATIENT)
Dept: UROGYNECOLOGY | Facility: CLINIC | Age: 40
End: 2025-04-15
Payer: COMMERCIAL

## 2025-04-15 VITALS
HEIGHT: 65 IN | WEIGHT: 183.44 LBS | HEART RATE: 86 BPM | DIASTOLIC BLOOD PRESSURE: 81 MMHG | BODY MASS INDEX: 30.56 KG/M2 | SYSTOLIC BLOOD PRESSURE: 115 MMHG

## 2025-04-15 DIAGNOSIS — N39.0 RECURRENT UTI: Primary | ICD-10-CM

## 2025-04-15 DIAGNOSIS — N89.8 VAGINAL DRYNESS: ICD-10-CM

## 2025-04-15 PROCEDURE — 3074F SYST BP LT 130 MM HG: CPT | Mod: CPTII,S$GLB,, | Performed by: NURSE PRACTITIONER

## 2025-04-15 PROCEDURE — 99999 PR PBB SHADOW E&M-EST. PATIENT-LVL III: CPT | Mod: PBBFAC,,, | Performed by: NURSE PRACTITIONER

## 2025-04-15 PROCEDURE — 99213 OFFICE O/P EST LOW 20 MIN: CPT | Mod: S$GLB,,, | Performed by: NURSE PRACTITIONER

## 2025-04-15 PROCEDURE — 3008F BODY MASS INDEX DOCD: CPT | Mod: CPTII,S$GLB,, | Performed by: NURSE PRACTITIONER

## 2025-04-15 PROCEDURE — 1160F RVW MEDS BY RX/DR IN RCRD: CPT | Mod: CPTII,S$GLB,, | Performed by: NURSE PRACTITIONER

## 2025-04-15 PROCEDURE — 1159F MED LIST DOCD IN RCRD: CPT | Mod: CPTII,S$GLB,, | Performed by: NURSE PRACTITIONER

## 2025-04-15 PROCEDURE — 3079F DIAST BP 80-89 MM HG: CPT | Mod: CPTII,S$GLB,, | Performed by: NURSE PRACTITIONER

## 2025-04-15 NOTE — PROGRESS NOTES
Urogyn follow up  04/15/2024  .  [facility], [place], operated by [facility] - UROGYNECOLOGY  [address] [address]    Cari Barillas  [id number]  1985      Cari Barillas is a 39 y.o. here for a urogyn follow up for frequent uti.    Last HPI from 12/19/2024     1)  UI:  (+) WOODY < (+) UUI  X 5years.  (--) pads rarely has to change clothes.  Daytime frequency: Q 3 hours.  Nocturia: No:   (--) dysuria,  (--) hematuria,  (+) frequent UTIs.  (+) complete bladder emptying.      2)  POP:  Absent.  Symptoms:(--)  .  (--) vaginal bleeding. (--) vaginal discharge. (+) sexually active.  (--) dyspareunia.   (--)  Vaginal dryness.  (--) vaginal estrogen use.   No pop  Pvr 60 mL     3)  BM:  (+) constipation/straining--rare  (--) chronic diarrhea. (--) hematochezia.  (--) fecal incontinence.  (--) fecal smearing/urgency.  (+) complete evacuation.       4)frequent uti  --symptoms include dysuria, urinary frequency, small voids  --only one culture on file-- e. Coli      5)MS  On ocrevis    Changes from last visit:  1)  Frequent UTIs (bladder infections):  --no current symptoms  --rare WOODY     2. Vaginal discharge  --bv has resolved         Past Medical History:   Diagnosis Date    Abnormal Pap smear of cervix     Anxiety     Depression     H/O LEEP     Multiple sclerosis        Past Surgical History:   Procedure Laterality Date    CERVICAL BIOPSY  W/ LOOP ELECTRODE EXCISION  2012    COLONOSCOPY N/A 10/25/2022    Procedure: COLONOSCOPY;  Surgeon: Janie Fleming MD;  Location: [facility] ENDO;  Service: Endoscopy;  Laterality: N/A;    ESOPHAGOGASTRODUODENOSCOPY N/A 10/25/2022    Procedure: EGD (ESOPHAGOGASTRODUODENOSCOPY);  Surgeon: Janie Fleming MD;  Location: [facility] ENDO;  Service: Endoscopy;  Laterality: N/A;    TONSILLECTOMY         Family History   Problem Relation Name Age of Onset    Colon cancer Maternal Grandmother  78    Diabetes Maternal Grandmother      Diabetes Mother         Social History  "    Socioeconomic History    Marital status: Single   Tobacco Use    Smoking status: Former     Current packs/day: 0.50     Types: Cigarettes    Smokeless tobacco: Never   Substance and Sexual Activity    Alcohol use: Yes    Drug use: Not Currently     Types: Marijuana, LSD, Cocaine, MDMA (Ecstacy)    Sexual activity: Yes     Partners: Male     Birth control/protection: None       Current Medications[1]    Review of patient's allergies indicates:   Allergen Reactions    Gluten protein     Soy        Well woman:  Pap test: 02/2023 normal hpv negative.  History of abnormal paps: Yes - LEEP.  History of STIs:  Yes - hpv  Mammogram: n/a  Colonoscopy: Date of last: 10/2022edundant left colon.                          - Internal hemorrhoids.                          - Hemorrhoids found on perianal exam. .  DEXA:  n/a     ROS:  As per HPI.      Exam  /81 (BP Location: Right arm, Patient Position: Sitting)   Pulse 86   Ht 5' 5" (1.651 m)   Wt 83.2 kg (183 lb 6.8 oz)   BMI 30.52 kg/m²   General: alert and oriented, no acute distress  Respiratory: normal respiratory effort  Abd: soft, non-tender, non-distended    Pelvic--deferred    Impression  1. Recurrent UTI        2. Vaginal dryness          We reviewed the above issues and discussed options for short-term versus long-term management of her problems.   Plan:   1)  Frequent UTIs (bladder infections):  --If you feel like you have a UTI:                --During the work week: call PCP or go to nearest [facility]              --After hours or on weekends: go to nearest [facility]                          --These facilities can check your urine for infection, send a urine culture to verify presence/absence of infection, and start treatment if needed.               --After you are evaluated, please send a message through [facility] portal or call your urogynecology provider's office to let them know so that they can follow trends.  --follow UTI " prevention tips (see attached)  --work on emptying bladder well:  --empty bladder every 2-3 hours.  Empty well: wait a minute, lean forward on toilet.    --prolapse present: no              --PVR  60 mL  --control bowel movements/fecal cross-contamination  --treat vaginal atrophy (dryness)  --empty bladder before and after intercourse  --for now continue probiotic  --consider taking daily combination pill: cranberry + D-mannose (0402-7066 mg) + probiotic               --look on Amazon or in drug/grocery store for any brand that has these components              --examples of brands: Biophix, Now, AZO  --consider need for further evaluation ( tract imaging, cystoscopy) if issue persists     2.follow up with gyn for annual exam-- rtc if needed for uti symptoms    I spent a total of 20 minutes on the day of the visit.       JOE Lambert-BC [facility]  Division of Female Pelvic Medicine and Reconstructive Surgery  Department of Obstetrics & Gynecology         [1]   Current Outpatient Medications   Medication Sig Dispense Refill    azelaic acid (AZELEX) 15 % gel Compound Azelaic Acid 15% / Metronidazole 1% / Ivermectin 1% Cream. Apply to face once or twice daily. 30 g 5    azelastine (ASTELIN) 137 mcg (0.1 %) nasal spray Use 2 sprays (274 mcg total) by Nasal route 2 (two) times daily. 30 mL 0    azithromycin (Z-LUZ) 250 MG tablet Take 2 tablets by mouth on day 1; Take 1 tablet by mouth on days 2-5 6 tablet 0    buPROPion (WELLBUTRIN XL) 300 MG 24 hr tablet Take 1 tablet (300 mg total) by mouth every morning. 30 tablet 11    buPROPion (WELLBUTRIN XL) 300 MG 24 hr tablet TAKE 1 TABLET BY MOUTH EVERY MORNING 30 tablet 11    buPROPion (WELLBUTRIN XL) 300 MG 24 hr tablet TAKE 1 TABLET BY MOUTH EVERY MORNING 90 tablet 3    fluticasone propionate (FLONASE) 50 mcg/actuation nasal spray spray 2 sprays (100 mcg total) by Each Nostril route 2 (two) times a day. 16 g 2    LORazepam (ATIVAN) 1 MG tablet Take  1 tablet by mouth every day as needed 30 tablet 1    LORazepam (ATIVAN) 1 MG tablet Take 1 tablet by mouth every day as needed for 30 days. 30 tablet 1    LORazepam (ATIVAN) 1 MG tablet Take 1 tablet every day by oral route as needed for 30 days. 30 tablet 1    magnesium 250 mg Tab Take 250 mg by mouth once daily.      meclizine (ANTIVERT) 25 mg tablet Take 1 tablet (25 mg total) by mouth daily as needed for Dizziness. 30 tablet 0    methocarbamoL (ROBAXIN) 500 MG Tab Take 1 tablet (500 mg total) by mouth 4 (four) times daily as needed (muscle pain (TMJ)). 30 tablet 2    methylphenidate HCl (RITALIN) 10 MG tablet Take 1 tablet every day by oral route in the morning. 30 tablet 0    methylphenidate HCl (RITALIN) 10 MG tablet Take 1 tablet every day by oral route in the morning. 30 tablet 0    methylphenidate HCl (RITALIN) 10 MG tablet Take 1 tablet every day by oral route in the morning. 30 tablet 0    methylphenidate HCl (RITALIN) 10 MG tablet Take 1 tablet every day by oral route in the morning. 30 tablet 0    ocrelizumab (OCREVUS IV) Inject into the vein.      tretinoin (RETIN-A) 0.025 % cream Compound Tretinoin 0.025% / Niacinamide 2% / Azelaic Acid 8% / Sodium Hyaluronate 0.25% Cream. Apply a pea-sized amount to entire face qhs. 30 g 5    albuterol (VENTOLIN HFA) 90 mcg/actuation inhaler Inhale 2 puffs into the lungs every 6 (six) hours as needed for Wheezing. Rescue (Patient not taking: Reported on 4/15/2025) 18 g 2    gabapentin (NEURONTIN) 300 MG capsule Take 300 mg by mouth 3 (three) times daily. (Patient not taking: Reported on 4/15/2025)      methylphenidate HCl (RITALIN) 10 MG tablet Take 1 tablet every day by oral route in the morning. (Patient not taking: Reported on 4/15/2025) 30 tablet 0    methylphenidate HCl (RITALIN) 10 MG tablet Take 1 tablet every day by oral route in the morning. (Patient not taking: Reported on 4/15/2025) 30 tablet 0    methylphenidate HCl (RITALIN) 10 MG tablet Take 1 tablet by  mouth every day in the morning. (Patient not taking: Reported on 4/15/2025) 30 tablet 0    methylphenidate HCl (RITALIN) 10 MG tablet Take 1 tablet by mouth every day in the morning. (Patient not taking: Reported on 4/15/2025) 30 tablet 0    methylphenidate HCl (RITALIN) 10 MG tablet Take 1 tablet every day by oral route in the morning. (Patient not taking: Reported on 4/15/2025) 30 tablet 0    methylphenidate HCl (RITALIN) 10 MG tablet Take 1 tablet every day by oral route in the morning. (Patient not taking: Reported on 4/15/2025) 30 tablet 0    WELLBUTRIN  mg 24 hr tablet Take 300 mg by mouth every morning. (Patient not taking: Reported on 4/15/2025)       No current facility-administered medications for this visit.

## 2025-04-15 NOTE — PATIENT INSTRUCTIONS
1)  Frequent UTIs (bladder infections):  --If you feel like you have a UTI:                --During the work week: call PCP or go to nearest [facility]              --After hours or on weekends: go to nearest [facility]                          --These facilities can check your urine for infection, send a urine culture to verify presence/absence of infection, and start treatment if needed.               --After you are evaluated, please send a message through [facility] portal or call your urogynecology provider's office to let them know so that they can follow trends.  --follow UTI prevention tips (see attached)  --work on emptying bladder well:  --empty bladder every 2-3 hours.  Empty well: wait a minute, lean forward on toilet.    --prolapse present: no              --PVR  60 mL  --control bowel movements/fecal cross-contamination  --treat vaginal atrophy (dryness)  --empty bladder before and after intercourse  --for now continue probiotic  --consider taking daily combination pill: cranberry + D-mannose (9120-0199 mg) + probiotic               --look on Amazon or in drug/grocery store for any brand that has these components              --examples of brands: Biophix, Now, AZO  --consider need for further evaluation ( tract imaging, cystoscopy) if issue persists     2.follow up with gyn for annual exam-- rtc if needed for uti symptoms

## 2025-05-01 ENCOUNTER — CLINICAL SUPPORT (OUTPATIENT)
Dept: REHABILITATION | Facility: HOSPITAL | Age: 40
End: 2025-05-01
Payer: COMMERCIAL

## 2025-05-01 DIAGNOSIS — R42 VERTIGO: ICD-10-CM

## 2025-05-01 DIAGNOSIS — H81.8X9 DEFICIT OF VESTIBULO-OCULAR REFLEX: ICD-10-CM

## 2025-05-01 DIAGNOSIS — R42 DIZZINESS: Primary | ICD-10-CM

## 2025-05-01 PROCEDURE — 97166 OT EVAL MOD COMPLEX 45 MIN: CPT | Mod: PO

## 2025-05-01 NOTE — PROGRESS NOTES
Outpatient Rehab    Occupational Therapy Evaluation (only)    Patient Name: Cari Barillas  MRN: [id number]  YOB: 1985  Encounter Date: 5/1/2025    Therapy Diagnosis:   Encounter Diagnoses   Name Primary?    Vertigo     Deficit of vestibulo-ocular reflex     Dizziness Yes     Physician: Roseanna Maddox APRN,*    Physician Orders: Eval and Treat  Medical Diagnosis: Vertigo    Visit # / Visits Authorized: 1 / 1  Insurance Authorization Period: 1/28/2025 to 12/31/2025  Date of Evaluation: 5/1/2025  Plan of Care Certification: 5/1/2025 to 7/18/2025 (1x/wk x 8 wks)      Time In: 1304   Time Out: 1350  Total Time: 46 minutes   Total Billable Time: 46 minutes     Precautions: Standard     Intake Outcome Measure for FOTO Survey    Therapist reviewed FOTO scores for Cari Barillas on 5/1/2025.   FOTO report - see Media section or FOTO account episode details.     Intake Score: 48%    Dizziness Handicap Inventory (DHI): 62/100   16-34= mild   36-52= moderate   >/= 54= severe       Subjective     History of Current Condition: Cari Barillas is a 39 y.o. female who presents to [facility] for evaluation and treatment secondary to medical dx of vertigo. Pt also has relapsing-remitting MS. She was dx with MS in 2020. Per chart review of neurology note from 1/28/2025, she is having dizzy spells. She sometimes feels like she is on a boat; other times, it feels like the room needs to catch up with her when she moves. Dizziness is not daily but is worse with sudden head movements. Sudden movements do not always trigger dizziness, but exacerbates it when she is experiencing dizziness. This can be debilitating a few days per month to the point where she cannot work. Pt presented to today's evaluation without dizziness.   Triggers: Random, acute episodes, but rapid head movements worsen dizziness when she is experiencing it    Alleviating Factors: Rest  "   Description of symptoms: "feels like the room needs to catch up to me; the room moves but I'm staying still" (oscillopsia); feels off balance during dizzy spells (sensation of spinning vs lightheadedness)   Onset: Dizziness started 7-8 years ago    Frequency: A couple times per month    Duration: All day    Positional changes: N/A   Limitations due to symptoms: Driving, working; calls in with dizzy spells and stays   Visual changes: No    Hearing Changes (hearing loss or tinnitus): No    Currently taking Meclizine: Has prescription but has never taken    History of migraines: Yes; gets a migraine occasionally; most recent was about a yr ago     Systems screening  Cardiovascular indicators: None    Is patient currently taking medication for stated indicators: N/A  Neurological: MS     Falls: None     History of Present Illness  Cari is a 39 y.o. female          Diagnostic tests related to this condition: MRI studies.   MRI Studies Details: MRI Brain 11/17/2024: "IMPRESSION - No significant change from prior with continued few scattered T2 FLAIR signal hyperintensity foci within the brain parenchyma while nonspecific in light of history remain concerning for mild degree of prior demyelinating plaque burden.     No definite new lesion to suggest significant interval or active demyelination."         Activities of Daily Living  Social history was obtained from Patient.    General Prior Level of Function Comments: Independent with ADLs, IADLs, and functional mobility  General Current Level of Function Comments: Independent with ADLs, IADLs, and functional mobility  Patient Responsibilities: Community mobility, Driving, Financial management, Health management, Home management, Meal prep, Laundry, Personal ADL, Shopping, Yard work        Pain     Patient reports a current pain level of 3/10. Pain at best is reported as 0/10. Pain at worst is reported as 7/10.   Location: LLE  Clinical Progression (since onset): " Stable  Pain Qualities: Tightness, Needle-like  Pain-Relieving Factors: Other (Comment)  Other Pain-Relieving Factors: None  Pain-Aggravating Factors: Other (Comment)  Other Pain-Aggravating Factors: None         Living Arrangements  Living Situation  Living Arrangements: Alone    2nd floor apartment; 2 flights of steps to front door with bilateral handrails but there are a few steps without handrails; DME: none       Employment  Employment Status: Employed full-time    and in alcohol      Past Medical History/Physical Systems Review:   Cari Barillas  has a past medical history of Abnormal Pap smear of cervix, Anxiety, Depression, H/O LEEP, and Multiple sclerosis.    Cari Barillas  has a past surgical history that includes Tonsillectomy; Cervical biopsy w/ loop electrode excision (2012); Esophagogastroduodenoscopy (N/A, 10/25/2022); and Colonoscopy (N/A, 10/25/2022).    Cari has a current medication list which includes the following prescription(s): albuterol, azelaic acid, azelastine, azithromycin, bupropion, bupropion, bupropion, fluticasone propionate, gabapentin, lorazepam, lorazepam, lorazepam, magnesium, meclizine, methocarbamol, methylphenidate hcl, methylphenidate hcl, methylphenidate hcl, methylphenidate hcl, methylphenidate hcl, methylphenidate hcl, methylphenidate hcl, methylphenidate hcl, methylphenidate hcl, methylphenidate hcl, ocrelizumab, tretinoin, and wellbutrin xl.    Review of patient's allergies indicates:   Allergen Reactions    Gluten protein     Soy         Objective         Involved Side: N/A  Dominant Side: Right  Date of Onset: Dizziness onset 7-8 yrs ago;dx with MS 5 yrs ago in 2020    Patient's Goals for Therapy: to address dizziness     Cognitive Exam:  Oriented: Person, Place, Time, and Situation  Behaviors: normal, cooperative  Follows Commands/attention: Follows multistep  commands  Communication: clear/fluent  Memory: No Deficits noted as determined  "by 3 word recall after 1 minute and 3 minutes  Safety awareness/insight to disability: aware of diagnosis, treatment, and prognosis  Coping skills/emotional control: Appropriate to situation    Oculomotor Exam:  Oculomotor ROM: WNL  Eye Alignment: WNL  Visual Field: NT  Acuity: Has glasses to see close, but does not wear them often     Spontaneous Nystagmus: None  Gaze Holding Nystagmus: None  Gaze Holding (No Fixation): NT  Smooth Pursuits:     Horizontal: Intact    Vertical: Intact   Saccades:    Horizontal: Intact    Vertical: Intact   Near Point Convergence (cm): WNL   VOR Cancellation: No visual slippage; mild dizziness     VOR Slow Head Movement: Intact   Head Thrust: Positive with head left, but quick corrections back to midline   Dynamic Visual Acuity (DVA) (using ETDRS chart): 4 line difference (11, 7); missed last letter on line 7     Physical Exam: WNL posture, cervical, and BUE range of motion. Strength and coordination not tested but no complaints of deficits.     Balance/Functional Mobility Assessment:     [place] Sensory Testing:  (P= Pass, F= Fail; note any sway; hold each position for 30")  Condition 1: (firm surface/feet together/eyes open) P; 30 sec  Condition 2: (firm surface/feet together/eyes closed) P; 30 sec  Condition 3: (firm surface/feet in tandem/eyes open) P; 30 sec, RLE forward   Condition 4: (firm surface/feet in tandem/eyes closed) P; 30 sec, RLE forward   Condition 5: (soft surface/feet together/eyes open) P; 30 sec  Condition 6: (soft surface/feet together/eyes closed) P; 30 sec, min sway   Condition 7: (Fukuda step test), measure distance varied from center starting position, > 30 deg deviation to either side indicates hypofunction of biased side NT    Functional Gait Assessment:   1. Gait on level surface =  3   (3) Normal: less than 5.5 sec, no A.D., no imbalance, normal gait pattern, deviates< 6in   (2) Mild impairment: 7-5.6 sec, uses A.D., mild gait deviations, or deviates 6-10 " in   (1) Moderate impairment: > 7 sec, slow speed, imbalance, deviates 10-15 in.   (0) Severe impairment: needs assist, deviates >15 in, reach/touch wall  2. Change in Gait Speed = 3   (3) Normal: smooth change w/o loss of balance or gait deviation, deviates < 6 in, significant difference between speeds   (2) Mild impairment: changes speed, but demonstrates mild gait deviations, deviates 6-10 in, OR no deviations but unable to significantly speed, OR uses A.D.   (1) Moderate impairment: minor changes to speed, OR changes speed w/ significant deviations, deviates 10-15 in, OR  Changes speed , but loses balance & recovers   (0) Severe impairment: cannot change speed, deviates >15 in, or loses balance & needs assist  3. Gait with horizontal head turns  = 3   (3) Normal: no change in gait, deviates <6 in   (2) Mild impairment: slight change in speed, deviates 6-10 in, OR uses A.D.   (1) Moderate impairment: moderate change in speed, deviates 10-15 in   (0) Severe impairment: severe disruption of gait, deviates >15in  4. Gait with vertical head turns = 3   (3) Normal: no change in gait, deviates <6 in   (2) Mild impairment: slight change in speed, deviates 6-10 in OR uses A.D.   (1) Moderate impairment: moderate change in speed, deviates 10-15 in   (0) Severe impairment: severe disruption of gait, deviates >15 in  5. Gait with pivot turns = 3   (3) Normal: performs safely in 3 sec, no LOB   (2) Mild impairment: performs in >3 sec & no LOB, OR turns safely & requires several steps to regain LOB   (1) Moderate impairment: turns slow, OR requires several small steps for balance following turn & stop   (0) Severe impairment: cannot turn safely, needs assist  6. Step over obstacle = 3   (3) Normal: steps over 2 stacked boxes w/o change in speed or LOB   (2) Mild impairment: able to step over 1 box w/o change in speed or LOB   (1) Moderate impairment: steps over 1 box but must slow down, may require VC   (0) Severe impairment:  cannot perform w/o assist  7. Gait with Narrow MARIANA = 3   (3) Normal: 10 steps no staggering   (2) Mild impairment: 7-9 steps   (1) Moderate impairment: 4-7 steps   (0) Severe impairment: < 4 steps or cannot perform w/o assist  8. Gait with eyes closed = 3   (3) Normal: < 7 sec, no A.D., no LOB, normal gait pattern, deviates <6 in   (2) Mild impairment: 7.1-9 sec, mild gait deviations, deviates 6-10 in   (1) Moderate impairment: > 9 sec, abnormal pattern, LOB, deviates 10-15 in   (0) Severe impairment: cannot perform w/o assist, LOB, deviates >15in  9. Ambulating Backwards = 3   (3) Normal: no A.D., no LOB, normal gait pattern, deviates <6in   (2) Mild impairment: uses A.D., slower speed, mild gait deviations, deviates 6-10 in   (1) Moderate impairment: slow speed, abnormal gait pattern, LOB, deviates 10-15 in   (0) Severe impairment: severe gait deviations or LOB, deviates >15in  10. Steps = 3   (3) Normal: alternating feet, no rail   (2) Mild Impairment: alternating feet, uses rail   (1) Moderate impairment: step-to, uses rail   (0) Severe impairment: cannot perform safely    Score 30/30     Score:   </=22/30 fall risk   <20/30 fall risk in older adults   <18/30 fall risk in Parkinsons     Modified Motion Sensitivity Test    All movements are done in standing, eyes are open, in front of a plain wall  Symptoms must to return to baseline before the next movement is performed  Diane records the change of intensity from baseline, then after the movement is completed    Baseline symptoms: 0/10     Movement Intensity (0-10)  0 = none  10 = severe Duration  <5s=0  5-10s=1  11-20s = 2  21-30s = 3  >30s = 4 Score   = Intensity + Duration   5x Horizontal head turns 2 0 2   2. 5x Vertical Head turns 0 0 0   3. 5x Right diagonal head turns (upper left down to right) 2 1 3   4. 5x Left diagonal head turns (upper right down to left) 2 1 3   5. 5x Trunk bends (bending knees reaching to floor) 0 0 0   6. 5x Right quarter body  turns (Look over right shoulder with trunk rotation, feet planted) 2 1 3   7. 5x Left quarter body turns (Look over left shoulder with trunk rotation, feet planted) 1 1 2   8. 1x 360 degree turn to the right  0 0 0   9. 1x 360 degree turn to the left 0 0 0   10. 5x VOR cancellation (follow thumbs horizontally with head/trunk rotation x45 degrees each way) 1 0 1   Total score   14     mMSQ = Total score x (# of positions) / 14.00 = 6    0-10 = mild range   11-30 = moderate   = severe    Time Entry(in minutes):  OT Evaluation (Moderate) Time Entry: 46    Assessment & Plan   Assessment  Cari presents with a condition of Moderate complexity.   Presentation of Symptoms: Unpredictable  Will Comorbidities Impact Care: Yes  MS       Functional Limitations: Other (Comment) Dizziness (acute, random, severe episodes of dizziness occurring 2-3 times per month; unknown etiology); dizzy spells affect pt's ability to work, drive, and participate in daily activities                  Prognosis: Fair  Prognosis Details: Fair due to unknown etiology of acute, random dizzy spells   Assessment Details: Cari Barillas is a 39 y.o. female referred to outpatient occupational therapy and presents with a medical diagnosis of vertigo, resulting in 2-3 random, acute, severe episodes of dizziness per month. There are no known triggers for these episodes. Symptoms described as oscillopsia and imbalance. Of note, pt also with MS dx since 2020. Main objective deficits include VOR deficit and motion intolerance. No central signs per oculomotor screen. Gaze stabilization impairment indicated by positive head thrust with head with head left and 4 line difference between static and dynamic head movements on the DVA (normal = 1-2 line difference). Modified Motion Sensitivity Test administered. Score of 6 suggests mild motion sensitivity. Static and dynamic balance were WNL. Following medical record review it is determined that patient will  "benefit from trialing skilled vestibular occupational therapy services to to determine if vestibular rehab will help to decrease frequency/intensity of acute onset dizziness episodes occurring a few times per month via interventions for gaze stabilization and habituation for motion tolerance.     Plan  From an occupational therapy perspective, the patient would benefit from: Skilled Rehab Services    Planned therapy interventions include: Therapeutic exercise, Therapeutic activities, Neuromuscular re-education, and ADLs/IADLs.            Visit Frequency: 1 times Per Week for 8 Weeks.       This plan was discussed with Patient.   Discussion participants: Agreed Upon Plan of Care             Patient's spiritual, cultural, and educational needs considered and patient agreeable to plan of care and goals.           Goals:   Active       Long Term Goals        Patient to perform VORx1 at 130 bpm WFL for improved gaze stabilization.        Start:  05/02/25    Expected End:  07/18/25            Patient will improve dynamic visual acuity (DVA) to 1-2 line difference to demonstrate improved gaze stabilization needed for driving, work, and leisure tasks.       Start:  05/02/25    Expected End:  07/18/25            Patient will exhibit improved MSQ to </= 1, indicating decreased motion sensitivity.       Start:  05/02/25    Expected End:  07/18/25            Pt to have no greater than 2 dizziness episodes rated as no greater than "moderate" intensity over the course of OT POC        Start:  05/02/25    Expected End:  07/18/25               Short Term Goals        Patient to perform VORx1 at 100 bpm WFL for improved gaze stabilization.        Start:  05/02/25    Expected End:  05/30/25            Patient will improve dynamic visual acuity (DVA) to </= 3 line difference to demonstrate improved gaze stabilization needed for driving, work, and leisure tasks.       Start:  05/02/25            Patient will exhibit improved mMST to " </= 3, indicating decreased motion sensitivity.       Start:  05/02/25    Expected End:  05/30/25            Patient will be independent with HEP emphasizing gaze stabilization and habituation       Start:  05/02/25    Expected End:  05/30/25                  Milagro Armenta, OT

## 2025-05-02 PROBLEM — R42 DIZZINESS: Status: ACTIVE | Noted: 2025-05-02

## 2025-05-02 PROBLEM — H81.8X9 DEFICIT OF VESTIBULO-OCULAR REFLEX: Status: ACTIVE | Noted: 2025-05-02

## 2025-05-13 ENCOUNTER — PATIENT MESSAGE (OUTPATIENT)
Dept: PSYCHIATRY | Facility: CLINIC | Age: 40
End: 2025-05-13
Payer: COMMERCIAL

## 2025-05-18 DIAGNOSIS — M79.2 NEUROPATHIC PAIN: ICD-10-CM

## 2025-05-18 NOTE — TELEPHONE ENCOUNTER
Care Due:                  Date            Visit Type   Department     Provider  --------------------------------------------------------------------------------                                ESTABLISHED                              PATIENT -    [facility] PRIMARY  Last Visit: 04-      [facility]           Gurwinder Michael  Next Visit: None Scheduled  None         None Found                                                            Last  Test          Frequency    Reason                     Performed    Due Date  --------------------------------------------------------------------------------    Office Visit  15 months..  albuterol................  04- 06-    [facility] Embedded Care Due Messages. Reference number: [id number].   5/18/2025 4:31:11 PM CDT

## 2025-05-19 RX ORDER — METHOCARBAMOL 500 MG/1
500 TABLET, FILM COATED ORAL 4 TIMES DAILY PRN
Qty: 30 TABLET | Refills: 2 | OUTPATIENT
Start: 2025-05-19

## 2025-05-29 ENCOUNTER — TELEPHONE (OUTPATIENT)
Dept: REHABILITATION | Facility: HOSPITAL | Age: 40
End: 2025-05-29
Payer: COMMERCIAL

## 2025-05-29 NOTE — TELEPHONE ENCOUNTER
Occupational Therapy: No show/Cancellation of Visit  Date: 05/29/2025    Called patient regarding 2 no shows post initial evaluation. Pt is being removed from the schedule at this time due to no show/cancellation policy. Also informed patient that she would be formally discharged if she does not call back to get on the schedule within a week's time. Contact number to clinic provided.     Patient was a no show to today's OT appointment. Cari Barillas did not call to cancel nor reschedule. This is the 2nd appointment that the patient has not attended. No charges have been posted today.     Cancel: 0  No show: 2    Therapist: Milagro Armenta, OT

## 2025-06-04 ENCOUNTER — OFFICE VISIT (OUTPATIENT)
Dept: NEUROLOGY | Facility: CLINIC | Age: 40
End: 2025-06-04
Payer: COMMERCIAL

## 2025-06-04 ENCOUNTER — LAB VISIT (OUTPATIENT)
Dept: LAB | Facility: HOSPITAL | Age: 40
End: 2025-06-04
Payer: COMMERCIAL

## 2025-06-04 VITALS
WEIGHT: 180.31 LBS | SYSTOLIC BLOOD PRESSURE: 119 MMHG | HEART RATE: 87 BPM | HEIGHT: 65 IN | BODY MASS INDEX: 30.04 KG/M2 | DIASTOLIC BLOOD PRESSURE: 91 MMHG

## 2025-06-04 DIAGNOSIS — Z20.2 POSSIBLE EXPOSURE TO STD: ICD-10-CM

## 2025-06-04 DIAGNOSIS — G35 MULTIPLE SCLEROSIS: Primary | ICD-10-CM

## 2025-06-04 DIAGNOSIS — G35 MULTIPLE SCLEROSIS: ICD-10-CM

## 2025-06-04 LAB
25(OH)D3+25(OH)D2 SERPL-MCNC: 43 NG/ML (ref 30–96)
ABSOLUTE EOSINOPHIL (OHS): 0.06 K/UL
ABSOLUTE MONOCYTE (OHS): 0.69 K/UL (ref 0.3–1)
ABSOLUTE NEUTROPHIL COUNT (OHS): 7.48 K/UL (ref 1.8–7.7)
ALBUMIN SERPL BCP-MCNC: 4.3 G/DL (ref 3.5–5.2)
ALP SERPL-CCNC: 96 UNIT/L (ref 40–150)
ALT SERPL W/O P-5'-P-CCNC: 21 UNIT/L (ref 10–44)
ANION GAP (OHS): 11 MMOL/L (ref 8–16)
AST SERPL-CCNC: 17 UNIT/L (ref 11–45)
BASOPHILS # BLD AUTO: 0.05 K/UL
BASOPHILS NFR BLD AUTO: 0.4 %
BILIRUB SERPL-MCNC: 0.3 MG/DL (ref 0.1–1)
BUN SERPL-MCNC: 10 MG/DL (ref 6–20)
CALCIUM SERPL-MCNC: 9.3 MG/DL (ref 8.7–10.5)
CHLORIDE SERPL-SCNC: 104 MMOL/L (ref 95–110)
CO2 SERPL-SCNC: 25 MMOL/L (ref 23–29)
CREAT SERPL-MCNC: 0.6 MG/DL (ref 0.5–1.4)
ERYTHROCYTE [DISTWIDTH] IN BLOOD BY AUTOMATED COUNT: 12.9 % (ref 11.5–14.5)
GFR SERPLBLD CREATININE-BSD FMLA CKD-EPI: >60 ML/MIN/1.73/M2
GLUCOSE SERPL-MCNC: 69 MG/DL (ref 70–110)
HBV CORE AB SERPL QL IA: NORMAL
HBV SURFACE AG SERPL QL IA: NORMAL
HCT VFR BLD AUTO: 44.5 % (ref 37–48.5)
HGB BLD-MCNC: 14.4 GM/DL (ref 12–16)
HIV 1+2 AB+HIV1 P24 AG SERPL QL IA: NORMAL
IGA SERPL-MCNC: 128 MG/DL (ref 40–350)
IGG SERPL-MCNC: 629 MG/DL (ref 650–1600)
IGM SERPL-MCNC: 74 MG/DL (ref 50–300)
IMM GRANULOCYTES # BLD AUTO: 0.05 K/UL (ref 0–0.04)
IMM GRANULOCYTES NFR BLD AUTO: 0.4 % (ref 0–0.5)
LYMPHOCYTES # BLD AUTO: 3.05 K/UL (ref 1–4.8)
MCH RBC QN AUTO: 30.6 PG (ref 27–31)
MCHC RBC AUTO-ENTMCNC: 32.4 G/DL (ref 32–36)
MCV RBC AUTO: 95 FL (ref 82–98)
NUCLEATED RBC (/100WBC) (OHS): 0 /100 WBC
PLATELET # BLD AUTO: 299 K/UL (ref 150–450)
PMV BLD AUTO: 11.9 FL (ref 9.2–12.9)
POTASSIUM SERPL-SCNC: 4.5 MMOL/L (ref 3.5–5.1)
PROT SERPL-MCNC: 6.9 GM/DL (ref 6–8.4)
RBC # BLD AUTO: 4.71 M/UL (ref 4–5.4)
RELATIVE EOSINOPHIL (OHS): 0.5 %
RELATIVE LYMPHOCYTE (OHS): 26.8 % (ref 18–48)
RELATIVE MONOCYTE (OHS): 6.1 % (ref 4–15)
RELATIVE NEUTROPHIL (OHS): 65.8 % (ref 38–73)
SODIUM SERPL-SCNC: 140 MMOL/L (ref 136–145)
T PALLIDUM IGG+IGM SER QL: NORMAL
VIT B12 SERPL-MCNC: 431 PG/ML (ref 210–950)
WBC # BLD AUTO: 11.38 K/UL (ref 3.9–12.7)

## 2025-06-04 PROCEDURE — 3080F DIAST BP >= 90 MM HG: CPT | Mod: CPTII,S$GLB,, | Performed by: STUDENT IN AN ORGANIZED HEALTH CARE EDUCATION/TRAINING PROGRAM

## 2025-06-04 PROCEDURE — 3074F SYST BP LT 130 MM HG: CPT | Mod: CPTII,S$GLB,, | Performed by: STUDENT IN AN ORGANIZED HEALTH CARE EDUCATION/TRAINING PROGRAM

## 2025-06-04 PROCEDURE — 1159F MED LIST DOCD IN RCRD: CPT | Mod: CPTII,S$GLB,, | Performed by: STUDENT IN AN ORGANIZED HEALTH CARE EDUCATION/TRAINING PROGRAM

## 2025-06-04 PROCEDURE — 36415 COLL VENOUS BLD VENIPUNCTURE: CPT

## 2025-06-04 PROCEDURE — 99214 OFFICE O/P EST MOD 30 MIN: CPT | Mod: S$GLB,,, | Performed by: STUDENT IN AN ORGANIZED HEALTH CARE EDUCATION/TRAINING PROGRAM

## 2025-06-04 PROCEDURE — 3008F BODY MASS INDEX DOCD: CPT | Mod: CPTII,S$GLB,, | Performed by: STUDENT IN AN ORGANIZED HEALTH CARE EDUCATION/TRAINING PROGRAM

## 2025-06-04 PROCEDURE — G2211 COMPLEX E/M VISIT ADD ON: HCPCS | Mod: S$GLB,,, | Performed by: STUDENT IN AN ORGANIZED HEALTH CARE EDUCATION/TRAINING PROGRAM

## 2025-06-04 PROCEDURE — 1160F RVW MEDS BY RX/DR IN RCRD: CPT | Mod: CPTII,S$GLB,, | Performed by: STUDENT IN AN ORGANIZED HEALTH CARE EDUCATION/TRAINING PROGRAM

## 2025-06-04 PROCEDURE — 99999 PR PBB SHADOW E&M-EST. PATIENT-LVL IV: CPT | Mod: PBBFAC,,, | Performed by: STUDENT IN AN ORGANIZED HEALTH CARE EDUCATION/TRAINING PROGRAM

## 2025-06-06 LAB — NEUROFILAMENT LIGHT CHAIN, PLASMA: 9.1 PG/ML

## 2025-06-07 ENCOUNTER — RESULTS FOLLOW-UP (OUTPATIENT)
Dept: NEUROLOGY | Facility: CLINIC | Age: 40
End: 2025-06-07

## 2025-06-07 ENCOUNTER — PATIENT MESSAGE (OUTPATIENT)
Dept: NEUROLOGY | Facility: CLINIC | Age: 40
End: 2025-06-07
Payer: COMMERCIAL

## 2025-06-09 LAB — LC GLIAL FIBRILLARY ACID PROTEIN: 23.8 PG/ML (ref 0–57.4)

## 2025-06-11 ENCOUNTER — DOCUMENTATION ONLY (OUTPATIENT)
Dept: REHABILITATION | Facility: HOSPITAL | Age: 40
End: 2025-06-11
Payer: COMMERCIAL

## 2025-06-11 NOTE — PROGRESS NOTES
[facility]  Discharge Note    Name: Cari Wahl [facility] Number: [id number]    Therapy Diagnosis:   Encounter Diagnoses   Name Primary?    Vertigo      Deficit of vestibulo-ocular reflex      Dizziness Yes     Physician: Roseanna Maddox APRN,*     Physician Orders: Eval and Treat   Medical Diagnosis: Vertigo   Evaluation Date: 5/1/2025     Date of Last visit: 5/1/2025  Total Visits Received: Eval only    ASSESSMENT      Pt did not present to any follow up appointments after initial evaluation and has no future appts scheduled. Therefore, pt is d/c.     Discharge reason: Patient has not attended therapy since initial eval on 5/1/25    Discharge FOTO Score: Intake only    Goals: No goals met; no f/u    PLAN     This patient is discharged from Occupational Therapy      Milagro Armenta OT

## 2025-06-19 ENCOUNTER — PATIENT MESSAGE (OUTPATIENT)
Dept: PSYCHIATRY | Facility: CLINIC | Age: 40
End: 2025-06-19
Payer: COMMERCIAL

## 2025-06-25 ENCOUNTER — OFFICE VISIT (OUTPATIENT)
Dept: DERMATOLOGY | Facility: CLINIC | Age: 40
End: 2025-06-25
Payer: COMMERCIAL

## 2025-06-25 DIAGNOSIS — L71.9 ROSACEA: ICD-10-CM

## 2025-06-25 PROCEDURE — 98005 SYNCH AUDIO-VIDEO EST LOW 20: CPT | Mod: 95,,, | Performed by: DERMATOLOGY

## 2025-06-25 PROCEDURE — 1160F RVW MEDS BY RX/DR IN RCRD: CPT | Mod: CPTII,95,, | Performed by: DERMATOLOGY

## 2025-06-25 PROCEDURE — 1159F MED LIST DOCD IN RCRD: CPT | Mod: CPTII,95,, | Performed by: DERMATOLOGY

## 2025-06-25 PROCEDURE — G2211 COMPLEX E/M VISIT ADD ON: HCPCS | Mod: 95,,, | Performed by: DERMATOLOGY

## 2025-06-25 RX ORDER — TRETINOIN 0.25 MG/G
CREAM TOPICAL
Qty: 30 G | Refills: 11 | Status: SHIPPED | OUTPATIENT
Start: 2025-06-25

## 2025-06-25 RX ORDER — AZELAIC ACID 0.15 G/G
GEL TOPICAL
Qty: 30 G | Refills: 11 | Status: SHIPPED | OUTPATIENT
Start: 2025-06-25

## 2025-06-25 NOTE — PROGRESS NOTES
The patient location is: home  The chief complaint leading to consultation is: rosacea  Visit type: virtual visit with synchronous audio and video  Total time spent with patient: 3 minutes  Each patient to whom I provide medical services by telemedicine is:  (1) informed of the relationship between the physician and patient and the respective role of any other health care provider with respect to management of the patient; and (2) notified that he or she may decline to receive medical services by telemedicine and may withdraw from such care at any time.    Patient Information  Name: Cari Barillas  : 1985  MRN: [id number]     Referring Physician:  No ref. provider found   Primary Care Physician:  Gurwinder Michael MD   Date of Visit: 25      Subjective:     History of Present lllness:    Cari Barillas is a 39 y.o. female who presents with a chief complaint of rosacea.    Location: face  Signs/Symptoms: a little redness currently but overall improved  Symptom course: improving  Current treatment: Compound Azelaic Acid 15% / Metronidazole 1% / Ivermectin 1% Cream, Compound Tretinoin 0.025% / Niacinamide 2% / Azelaic Acid 8% / Sodium Hyaluronate 0.25% Cream (has not been using it as much lately but it does help when she is using it)    Patient was last seen: 3/20/2025.  Prior notes by myself reviewed.   Clinical documentation obtained by nursing staff reviewed.    Review of Systems    Objective:   Physical Exam     Diagram Legend     Erythematous scaling macule/papule c/w actinic keratosis       Vascular papule c/w angioma      Pigmented verrucoid papule/plaque c/w seborrheic keratosis      Yellow umbilicated papule c/w sebaceous hyperplasia      Irregularly shaped tan macule c/w lentigo     1-2 mm smooth white papules consistent with Milia      Movable subcutaneous cyst with punctum c/w epidermal inclusion cyst      Subcutaneous movable cyst c/w pilar cyst      Firm pink to brown papule c/w  dermatofibroma      Pedunculated fleshy papule(s) c/w skin tag(s)      Evenly pigmented macule c/w junctional nevus     Mildly variegated pigmented, slightly irregular-bordered macule c/w mildly atypical nevus      Flesh colored to evenly pigmented papule c/w intradermal nevus       Pink pearly papule/plaque c/w basal cell carcinoma      Erythematous hyperkeratotic cursted plaque c/w SCC      Surgical scar with no sign of skin cancer recurrence      Open and closed comedones      Inflammatory papules and pustules      Verrucoid papule consistent consistent with wart     Erythematous eczematous patches and plaques     Dystrophic onycholytic nail with subungual debris c/w onychomycosis     Umbilicated papule    Erythematous-base heme-crusted tan verrucoid plaque consistent with inflamed seborrheic keratosis     Erythematous Silvery Scaling Plaque c/w Psoriasis     See annotation          [] Data reviewed  [] Prior external notes reviewed  [] Independent review of test  [] Management discussed with another provider  [] Independent historian    Assessment / Plan:        Rosacea   - stable and chronic  -     azelaic acid (AZELEX) 15 % gel; Compound Azelaic Acid 15% / Metronidazole 1% / Ivermectin 1% Cream. Apply to face once or twice daily.  Dispense: 30 g; Refill: 11  -     tretinoin (RETIN-A) 0.025 % cream; Compound Tretinoin 0.025% / Niacinamide 2% / Azelaic Acid 8% / Sodium Hyaluronate 0.25% Cream. Apply a pea-sized amount to entire face qhs.  Dispense: 30 g; Refill: 11      Follow up in about 1 year (around 6/25/2026) for follow up, or sooner if symptoms worsening or not improving.      Nohemi Russo MD, FAAD  [facility]

## 2025-07-02 ENCOUNTER — PATIENT MESSAGE (OUTPATIENT)
Dept: NEUROLOGY | Facility: CLINIC | Age: 40
End: 2025-07-02
Payer: COMMERCIAL

## 2025-07-15 ENCOUNTER — OFFICE VISIT (OUTPATIENT)
Dept: PRIMARY CARE CLINIC | Facility: CLINIC | Age: 40
End: 2025-07-15
Payer: COMMERCIAL

## 2025-07-15 VITALS
HEART RATE: 88 BPM | HEIGHT: 65 IN | DIASTOLIC BLOOD PRESSURE: 85 MMHG | SYSTOLIC BLOOD PRESSURE: 120 MMHG | TEMPERATURE: 98 F | WEIGHT: 176.25 LBS | BODY MASS INDEX: 29.37 KG/M2

## 2025-07-15 DIAGNOSIS — M25.561 CHRONIC PAIN OF RIGHT KNEE: Primary | ICD-10-CM

## 2025-07-15 DIAGNOSIS — G89.29 CHRONIC PAIN OF RIGHT KNEE: Primary | ICD-10-CM

## 2025-07-15 DIAGNOSIS — M76.899 QUADRICEPS TENDINITIS: ICD-10-CM

## 2025-07-15 DIAGNOSIS — M79.2 NEUROPATHIC PAIN: ICD-10-CM

## 2025-07-15 PROCEDURE — 3079F DIAST BP 80-89 MM HG: CPT | Mod: CPTII,S$GLB,, | Performed by: FAMILY MEDICINE

## 2025-07-15 PROCEDURE — 1159F MED LIST DOCD IN RCRD: CPT | Mod: CPTII,S$GLB,, | Performed by: FAMILY MEDICINE

## 2025-07-15 PROCEDURE — 3008F BODY MASS INDEX DOCD: CPT | Mod: CPTII,S$GLB,, | Performed by: FAMILY MEDICINE

## 2025-07-15 PROCEDURE — 99999 PR PBB SHADOW E&M-EST. PATIENT-LVL V: CPT | Mod: PBBFAC,,, | Performed by: FAMILY MEDICINE

## 2025-07-15 PROCEDURE — 3074F SYST BP LT 130 MM HG: CPT | Mod: CPTII,S$GLB,, | Performed by: FAMILY MEDICINE

## 2025-07-15 PROCEDURE — 99214 OFFICE O/P EST MOD 30 MIN: CPT | Mod: S$GLB,,, | Performed by: FAMILY MEDICINE

## 2025-07-15 RX ORDER — METHOCARBAMOL 500 MG/1
500 TABLET, FILM COATED ORAL 4 TIMES DAILY PRN
Qty: 30 TABLET | Refills: 3 | Status: SHIPPED | OUTPATIENT
Start: 2025-07-15

## 2025-07-15 RX ORDER — DICLOFENAC SODIUM 10 MG/G
2 GEL TOPICAL 4 TIMES DAILY
Qty: 100 G | Refills: 1 | Status: SHIPPED | OUTPATIENT
Start: 2025-07-15

## 2025-07-24 NOTE — PROGRESS NOTES
Clinic Note  7/24/2025      Subjective:       Patient ID:  Cari is a 39 y.o. female being seen for:      History of Present Illness    CHIEF COMPLAINT:  Cari presents today for annual checkup and right knee pain.    RIGHT KNEE PAIN:  She reports right knee pain located above the kneecap in the quadriceps tendon area with constant soreness persisting for the past month. 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 She describes a severe grinding sensation with a bone-on-bone feeling when putting full weight on the leg, causing her to immediately unweight the limb. The grinding sensation has decreased in frequency over the past couple of weeks due to reduced activity levels. She reports no significant relief from knee soreness regardless of position. She denies specific injury or trauma preceding the onset of symptoms. She has a history of Osgood-Schlatter disease from high school. She currently uses THC lotion for relief when available.    MULTIPLE SCLEROSIS:  She has multiple sclerosis currently managed with Ocrevus.    WEIGHT MANAGEMENT:  She reports a previous significant weight loss of 60 lbs, followed by weight regain of 20-30 lbs in the past 2-3 years. She is currently experiencing minimal weight loss of approximately 4-5 lbs. She experienced sleep apnea symptoms when weight was over 200 lbs and expresses desire to prevent recurrence. She recently quit smoking with minimal weight gain of approximately 4-5 lbs since cessation, which she attributes to increased snacking.    DIET:  She follows a gluten-free diet consisting primarily of chicken and seafood with minimal pork consumption. She regularly prepares morning smoothies using frozen fruit and protein powder. She acknowledges enjoying foods like bread, pasta, and rice but currently avoids gluten-containing products.    CURRENT MEDICATIONS:  She takes Ocrevus for multiple sclerosis, Robaxin infrequently for muscle  relaxation, Ritalin 10 mg daily, and semaglutide for weight loss.      ROS:  General: -fever, -chills, -fatigue, -weight gain, -weight loss  Eyes: -vision changes, -redness, -discharge  ENT: -ear pain, -nasal congestion, -sore throat  Cardiovascular: -chest pain, -palpitations, -lower extremity edema  Respiratory: -cough, -shortness of breath  Gastrointestinal: -abdominal pain, -nausea, -vomiting, -diarrhea, -constipation, -blood in stool, +increased appetite  Genitourinary: -dysuria, -hematuria, -frequency  Musculoskeletal: +joint pain, -muscle pain, +pain with movement  Skin: -rash, -lesion  Neurological: -headache, -dizziness, -numbness, -tingling  Psychiatric: -anxiety, -depression, -sleep difficulty           Medication List with Changes/Refills   New Medications    DICLOFENAC SODIUM (VOLTAREN) 1 % GEL    Apply 2 g topically 4 (four) times daily.   Current Medications    AZELAIC ACID (AZELEX) 15 % GEL    Compound Azelaic Acid 15% / Metronidazole 1% / Ivermectin 1% Cream. Apply to face once or twice daily.    AZELASTINE (ASTELIN) 137 MCG (0.1 %) NASAL SPRAY    Use 2 sprays (274 mcg total) by Nasal route 2 (two) times daily.    BUPROPION (WELLBUTRIN XL) 300 MG 24 HR TABLET    TAKE 1 TABLET BY MOUTH EVERY MORNING    FLUTICASONE PROPIONATE (FLONASE) 50 MCG/ACTUATION NASAL SPRAY    spray 2 sprays (100 mcg total) by Each Nostril route 2 (two) times a day.    LORAZEPAM (ATIVAN) 1 MG TABLET    Take 1 tablet every day by mouth as needed for 30 days.    MECLIZINE (ANTIVERT) 25 MG TABLET    Take 1 tablet (25 mg total) by mouth daily as needed for Dizziness.    METHYLPHENIDATE HCL (RITALIN) 10 MG TABLET    Take 1 tablet every day by oral route in the morning.    METHYLPHENIDATE HCL (RITALIN) 10 MG TABLET    Take 1 tablet every day by oral route in the morning.    METHYLPHENIDATE HCL (RITALIN) 10 MG TABLET    Take 1 tablet twice a day by oral route.    METHYLPHENIDATE HCL (RITALIN) 10 MG TABLET    Take 1 tablet by mouth  "twice daily.    OCRELIZUMAB (OCREVUS IV)    Inject into the vein.    SEMAGLUTIDE, WEIGHT LOSS, 0.5 MG/0.5 ML PNIJ    Inject 0.5 mg into the skin every 7 days.    TRETINOIN (RETIN-A) 0.025 % CREAM    Compound Tretinoin 0.025% / Niacinamide 2% / Azelaic Acid 8% / Sodium Hyaluronate 0.25% Cream. Apply a pea-sized amount to entire face qhs.   Changed and/or Refilled Medications    Modified Medication Previous Medication    METHOCARBAMOL (ROBAXIN) 500 MG TAB methocarbamoL (ROBAXIN) 500 MG Tab       Take 1 tablet (500 mg total) by mouth 4 (four) times daily as needed (muscle pain (TMJ)).    Take 1 tablet (500 mg total) by mouth 4 (four) times daily as needed (muscle pain (TMJ)).   Discontinued Medications    BUPROPION (WELLBUTRIN XL) 300 MG 24 HR TABLET    Take 1 tablet (300 mg total) by mouth every morning.    BUPROPION (WELLBUTRIN XL) 300 MG 24 HR TABLET    TAKE 1 TABLET BY MOUTH EVERY MORNING    LORAZEPAM (ATIVAN) 1 MG TABLET    Take 1 tablet by mouth every day as needed for 30 days.    MAGNESIUM 250 MG TAB    Take 250 mg by mouth once daily.    METHYLPHENIDATE HCL (RITALIN) 10 MG TABLET    Take 1 tablet every day by oral route in the morning.       Problem List[1]        Objective:      /85 (BP Location: Left arm, Patient Position: Sitting)   Pulse 88   Temp 98.1 °F (36.7 °C) (Oral)   Ht 5' 5" (1.651 m)   Wt 79.9 kg (176 lb 4.1 oz)   LMP 07/01/2025 (Approximate)   BMI 29.33 kg/m²       Physical Exam    General: No acute distress. Well-developed. Well-nourished.  Eyes: EOMI. Sclerae anicteric.  HENT: Normocephalic. Atraumatic. Nares patent. Moist oral mucosa.  Cardiovascular: Regular rate. Regular rhythm. No murmurs. No rubs. No gallops. Normal S1, S2.  Respiratory: Normal respiratory effort. Clear to auscultation bilaterally. No rales. No rhonchi. No wheezing.  Musculoskeletal: No  obvious deformity.  Extremities: No lower extremity edema.  Neurological: Alert & oriented x3. No slurred speech. Normal " gait.  Psychiatric: Normal mood. Normal affect. Good insight. Good judgment.  Skin: Warm. Dry. No rash.  MSK: Knee - Left: Left knee normal.  MSK: Knee - Right: Pain in quadriceps tendon area of right knee.           Assessment and Plan:     Assessment & Plan    - Assessed right knee pain, suspecting quadriceps tendinitis based on description and exam.  - Considered history of Osgood-Schlatter disease, but focused on current grinding sensation and general soreness.  - Initiated conservative management with physical therapy and topical anti-inflammatory medication.  - Reviewed recent bloodwork, noting normal results for most tests.  - Discussed weight management options, considering desire to discontinue semaglutide due to cost.    MULTIPLE SCLEROSIS:  - Cari continues on Ocrevus for multiple sclerosis.  - Blood counts, kidney, and liver function tests look good.  - Neurologist is monitoring the condition with labs.  - Continued Ritalin (methylphenidate) 10 mg daily.    OVERWEIGHT AND WEIGHT MANAGEMENT:  - Cari is unhappy with 20-30 pound weight gain over the last 2-3 years.  - Semaglutide has not been effective despite increased dosage, and patient is considering discontinuation due to cost and lack of insurance coverage.  - Discussed potential return of hunger sensations upon discontinuation.  - Educated patient on the impact of high-carbohydrate foods on weight loss efforts and the body's energy utilization process.  - Recommend focusing on protein-rich meals (such as eggs and meat for breakfast instead of smoothies) and reducing carbohydrate intake to promote fat burning and support weight management.    MUSCULOSKELETAL ISSUES (OSGOOD-SCHLATTER DISEASE AND KNEE PAIN):  - Cari has history of Osgood-Schlatter disease from high school with current grinding sensation and general soreness in both knees, worse on the right.  - Explained knee anatomy, including quad tendon attachment to the kneecap.  - Treatment plan  includes: Voltaren gel (diclofenac sodium topical gel) for pain management, refilled Robaxin (methocarbamol) for as-needed use, XR of right knee to evaluate bone structure and rule out other pathologies, referral to physical therapy for quadriceps tendinitis rehabilitation, and potential orthopedics referral if conservative measures prove ineffective.    SLEEP APNEA:  - Cari experienced sleep apnea symptoms when weight was over 200 lbs.    SMOKING CESSATION:  - Cari has recently quit smoking without significant associated weight gain.    OTHER:  - Blood sugar was slightly low, but no diabetes present.    FOLLOW-UP:  - Cari to follow up in 4-6 weeks after completing physical therapy to discuss progress.  - XRs to be scheduled through [facility].         Follow Up:   No follow-ups on file.    Other Orders Placed This Visit:  Orders Placed This Encounter    X-Ray Knee Complete 4 Or More Views Right    Ambulatory Referral/Consult to Physical Therapy    Ambulatory referral/consult to Orthopedics    diclofenac sodium (VOLTAREN) 1 % Gel    methocarbamoL (ROBAXIN) 500 MG Tab         Gurwinder Michael MD        This note is dictated on M*Modal word recognition program and This note was generated with the assistance of ambient listening technology. Verbal consent was obtained by the patient and accompanying visitor(s) for the recording of patient appointment to facilitate this note. I attest to having reviewed and edited the generated note for accuracy, though some syntax or spelling errors may persist. Please contact the author of this note for any clarification.  .  There are word recognition mistakes that are occasionally missed on review.         [1]   Patient Active Problem List  Diagnosis    Paresthesia of left upper extremity    Chronic alcohol use    Multiple sclerosis    Immunosuppression due to drug therapy    Chronic fatigue    Anxiety    Neuropathic pain    Vitamin D insufficiency    JUNIOR (obstructive sleep apnea)     Nausea & vomiting    Diarrhea    Deficit of vestibulo-ocular reflex    Dizziness

## 2025-07-30 ENCOUNTER — PATIENT MESSAGE (OUTPATIENT)
Dept: PSYCHIATRY | Facility: CLINIC | Age: 40
End: 2025-07-30
Payer: COMMERCIAL

## 2025-08-01 ENCOUNTER — PATIENT MESSAGE (OUTPATIENT)
Dept: PSYCHIATRY | Facility: CLINIC | Age: 40
End: 2025-08-01
Payer: COMMERCIAL

## 2025-08-09 ENCOUNTER — PATIENT MESSAGE (OUTPATIENT)
Dept: NEUROLOGY | Facility: CLINIC | Age: 40
End: 2025-08-09
Payer: COMMERCIAL